# Patient Record
Sex: FEMALE | Race: WHITE | Employment: OTHER | URBAN - METROPOLITAN AREA
[De-identification: names, ages, dates, MRNs, and addresses within clinical notes are randomized per-mention and may not be internally consistent; named-entity substitution may affect disease eponyms.]

---

## 2017-01-04 DIAGNOSIS — G40.909 SEIZURE DISORDER (HCC): ICD-10-CM

## 2017-01-04 RX ORDER — PHENYTOIN SODIUM 100 MG/1
CAPSULE, EXTENDED RELEASE ORAL
Qty: 270 CAP | Refills: 1 | Status: SHIPPED | OUTPATIENT
Start: 2017-01-04 | End: 2017-07-27 | Stop reason: SDUPTHER

## 2017-04-21 ENCOUNTER — HOSPITAL ENCOUNTER (OUTPATIENT)
Dept: MAMMOGRAPHY | Age: 72
Discharge: HOME | End: 2017-04-21
Attending: INTERNAL MEDICINE
Payer: MEDICARE

## 2017-04-21 DIAGNOSIS — Z12.31 VISIT FOR SCREENING MAMMOGRAM: ICD-10-CM

## 2017-04-21 PROCEDURE — 77067 SCR MAMMO BI INCL CAD: CPT

## 2017-05-17 ENCOUNTER — OFFICE VISIT (OUTPATIENT)
Dept: INTERNAL MEDICINE CLINIC | Age: 72
End: 2017-05-17

## 2017-05-17 ENCOUNTER — HOSPITAL ENCOUNTER (OUTPATIENT)
Dept: LAB | Age: 72
Discharge: HOME | End: 2017-05-17
Payer: MEDICARE

## 2017-05-17 VITALS
DIASTOLIC BLOOD PRESSURE: 82 MMHG | BODY MASS INDEX: 29.66 KG/M2 | RESPIRATION RATE: 18 BRPM | WEIGHT: 178 LBS | HEIGHT: 65 IN | SYSTOLIC BLOOD PRESSURE: 136 MMHG | HEART RATE: 78 BPM | OXYGEN SATURATION: 97 % | TEMPERATURE: 98.8 F

## 2017-05-17 DIAGNOSIS — G40.909 SEIZURE DISORDER (HCC): ICD-10-CM

## 2017-05-17 DIAGNOSIS — E55.9 VITAMIN D DEFICIENCY: ICD-10-CM

## 2017-05-17 DIAGNOSIS — Z00.00 MEDICARE ANNUAL WELLNESS VISIT, SUBSEQUENT: Primary | ICD-10-CM

## 2017-05-17 DIAGNOSIS — Z13.39 SCREENING FOR ALCOHOLISM: ICD-10-CM

## 2017-05-17 DIAGNOSIS — Z00.00 ROUTINE GENERAL MEDICAL EXAMINATION AT A HEALTH CARE FACILITY: ICD-10-CM

## 2017-05-17 PROCEDURE — 80053 COMPREHEN METABOLIC PANEL: CPT

## 2017-05-17 PROCEDURE — 36415 COLL VENOUS BLD VENIPUNCTURE: CPT

## 2017-05-17 PROCEDURE — 81001 URINALYSIS AUTO W/SCOPE: CPT

## 2017-05-17 PROCEDURE — 84443 ASSAY THYROID STIM HORMONE: CPT

## 2017-05-17 PROCEDURE — 80185 ASSAY OF PHENYTOIN TOTAL: CPT

## 2017-05-17 PROCEDURE — 82306 VITAMIN D 25 HYDROXY: CPT

## 2017-05-17 PROCEDURE — 85025 COMPLETE CBC W/AUTO DIFF WBC: CPT

## 2017-05-17 NOTE — MR AVS SNAPSHOT
Visit Information Date & Time Provider Department Dept. Phone Encounter #  
 5/17/2017  3:00 PM Ry Olivia MD [address] [id number] Internal Medicine [phone] [id number] Upcoming Health Maintenance Date Due  
 MEDICARE YEARLY EXAM 4/3/2015 INFLUENZA AGE 9 TO ADULT 8/1/2017 GLAUCOMA SCREENING Q2Y 10/15/2018 BREAST CANCER SCRN MAMMOGRAM 4/21/2019 COLONOSCOPY 1/13/2021 DTaP/Tdap/Td series (2 - Td) 1/15/2024 Allergies as of 5/17/2017  Review Complete On: 5/17/2017 By: Ry Olivia MD  
  
 Severity Noted Reaction Type Reactions Epinephrine High 10/21/2009    Unknown (comments) Sulfa (Sulfonamide Antibiotics) High 10/21/2009   Side Effect Other (comments) Kidneys shut down Ephedrine Medium 01/12/2011   Side Effect Palpitations Nsaids (Non-steroidal Anti-inflammatory Drug) Medium 01/12/2011   Side Effect Diarrhea Indigestion, nausea Ampicillin  10/21/2009    Unknown (comments) Codeine  10/21/2009    Unknown (comments) Demerol [Meperidine]  10/21/2009    Unknown (comments) Dilaudid [Hydromorphone (Bulk)]  10/21/2009    Unknown (comments) Erythromycin  10/21/2009    Unknown (comments) Levaquin [Levofloxacin]  10/21/2009   Intolerance Other (comments) GI upset Lidocaine  11/18/2009    Shortness of Breath Ciprofloxacin Low 01/12/2011   Side Effect Nausea and Vomiting Current Immunizations  Reviewed on 5/17/2017 Name Date Hep A Vaccine 2/15/2005 Influenza High Dose Vaccine PF 11/7/2016, 10/6/2014, 10/2/2013 Influenza Vaccine Split 10/16/2011 Influenza Vaccine Whole 11/11/2012, 10/15/2010 Pneumococcal Conjugate (PCV-13) 1/29/2016 Pneumococcal Vaccine (Unspecified Type) 11/18/2009 Td, Adsorbed PF 2/15/2005 Tdap 1/15/2014 Typhoid Vi Capsular Polysaccharide Vaccine 2/5/2005 Zoster Vaccine, Live 1/23/2013  Reviewed by Ry Olivia MD on 5/17/2017 at  3:23 PM  
You Were Diagnosed With   
  
 Codes Comments Medicare annual wellness visit, subsequent    -  Primary ICD-10-CM: Z00.00 ICD-9-CM: V70.0 Seizure disorder ([address]     ICD-10-CM: G40.909 ICD-9-CM: 345.90 Vitamin D deficiency     ICD-10-CM: E55.9 ICD-9-CM: 268.9 Routine general medical examination at a health care facility     ICD-10-CM: Z00.00 ICD-9-CM: V70.0 Screening for alcoholism     ICD-10-CM: Z13.89 ICD-9-CM: V79.1 Vitals BP Pulse Temp Resp Height(growth percentile) Weight(growth percentile) 136/82 78 98.8 °F (37.1 °C) (Oral) 18 5' 4.5\" (1.638 m) 178 lb (80.7 kg) SpO2 BMI OB Status Smoking Status 97% 30.08 kg/m2 Postmenopausal Former Smoker Vitals History BMI and BSA Data Body Mass Index Body Surface Area 30.08 kg/m 2 1.92 m 2 Preferred Pharmacy Pharmacy Name Phone [address] [facility] [phone] Your Updated Medication List  
  
   
This list is accurate as of: 5/17/17  3:51 PM.  Always use your most recent med list.  
  
  
  
  
 Biotin 2,500 mcg Cap Take  by mouth. CENTRUM SILVER Tab tablet Generic drug:  multivitamins-minerals-lutein Take  by mouth. cetirizine 10 mg tablet Commonly known as:  ZYRTEC Take 1 Tab by mouth nightly. DILANTIN EXTENDED 100 mg ER capsule Generic drug:  phenytoin ER  
TAKE 3 CAPSULES (300 MG) DAILY docusate sodium 100 mg capsule Commonly known as:  Darnelle Elias Take 100 mg by mouth two (2) times a day. FISH OIL 1,000 mg Cap Generic drug:  omega-3 fatty acids-vitamin e Take 1 Cap by mouth. folic acid 1 mg tablet Commonly known as:  Google Take 0.4 mg by mouth daily. pantoprazole 40 mg tablet Commonly known as:  PROTONIX Take 1 Tab by mouth daily. Indications: GASTROESOPHAGEAL REFLUX  
  
 ROGAINE EX  
by Apply Externally route. VITAMIN D3 1,000 unit tablet Generic drug:  cholecalciferol Take  by mouth daily. We Performed the Following CBC WITH AUTOMATED DIFF [85903 CPT(R)] METABOLIC PANEL, COMPREHENSIVE [51513 CPT(R)] PHENYTOIN [id number] CPT(R)] TSH RFX ON ABNORMAL TO FREE T4 [[id number] Custom] UA/M W/RFLX CULTURE, ROUTINE [[id number] Custom] VITAMIN D, 25 HYDROXY [id number] CPT(R)] Introducing [facility]! Dear Glenn Gao: Thank you for requesting a [employer] account. Our records indicate that you already have an active [employer] account. You can access your account anytime at [email or web address] Did you know that you can access your hospital and ER discharge instructions at any time in [employer]? You can also review all of your test results from your hospital stay or ER visit. Additional Information If you have questions, please visit the Frequently Asked Questions section of the [employer] website at [email or web address] Remember, [employer] is NOT to be used for urgent needs. For medical emergencies, dial 911. Now available from your iPhone and Android! Please provide this summary of care documentation to your next provider. Your primary care clinician is listed as Vivi Molina64 If you have any questions after today's visit, please call [phone].

## 2017-05-17 NOTE — ACP (ADVANCE CARE PLANNING)
Advance Care Planning (ACP) Provider Conversation Snapshot    Date of ACP Conversation: 05/17/17  Persons included in Conversation:  patient  Length of ACP Conversation in minutes:  <16 minutes (Non-Billable)    Authorized Decision Maker (if patient is incapable of making informed decisions): This person is:   Healthcare Agent/Medical Power of  under Advance Directive          For Patients with Decision Making Capacity:   Values/Goals: Exploration of values, goals, and preferences if recovery is not expected, even with continued medical treatment in the event of:  Imminent death    Conversation Outcomes / Follow-Up Plan:   has a document and will provide to us.

## 2017-05-18 LAB
25(OH)D3+25(OH)D2 SERPL-MCNC: 33.4 NG/ML (ref 30–100)
ALBUMIN SERPL-MCNC: 4.5 G/DL (ref 3.5–4.8)
ALBUMIN/GLOB SERPL: 1.7 {RATIO} (ref 1.2–2.2)
ALP SERPL-CCNC: 151 IU/L (ref 39–117)
ALT SERPL-CCNC: 14 IU/L (ref 0–32)
APPEARANCE UR: CLEAR
AST SERPL-CCNC: 20 IU/L (ref 0–40)
BACTERIA #/AREA URNS HPF: NORMAL /[HPF]
BASOPHILS # BLD AUTO: 0 X10E3/UL (ref 0–0.2)
BASOPHILS NFR BLD AUTO: 0 %
BILIRUB SERPL-MCNC: 0.2 MG/DL (ref 0–1.2)
BILIRUB UR QL STRIP: NEGATIVE
BUN SERPL-MCNC: 18 MG/DL (ref 8–27)
BUN/CREAT SERPL: 40 (ref 12–28)
CALCIUM SERPL-MCNC: 10.2 MG/DL (ref 8.7–10.3)
CASTS URNS QL MICRO: NORMAL /LPF
CHLORIDE SERPL-SCNC: 99 MMOL/L (ref 96–106)
CO2 SERPL-SCNC: 22 MMOL/L (ref 18–29)
COLOR UR: YELLOW
CREAT SERPL-MCNC: 0.45 MG/DL (ref 0.57–1)
EOSINOPHIL # BLD AUTO: 0 X10E3/UL (ref 0–0.4)
EOSINOPHIL NFR BLD AUTO: 0 %
EPI CELLS #/AREA URNS HPF: NORMAL /HPF
ERYTHROCYTE [DISTWIDTH] IN BLOOD BY AUTOMATED COUNT: 13.9 % (ref 12.3–15.4)
GLOBULIN SER CALC-MCNC: 2.7 G/DL (ref 1.5–4.5)
GLUCOSE SERPL-MCNC: 125 MG/DL (ref 65–99)
GLUCOSE UR QL: NEGATIVE
HCT VFR BLD AUTO: 37.6 % (ref 34–46.6)
HGB BLD-MCNC: 12.9 G/DL (ref 11.1–15.9)
HGB UR QL STRIP: NEGATIVE
IMM GRANULOCYTES # BLD: 0 X10E3/UL (ref 0–0.1)
IMM GRANULOCYTES NFR BLD: 0 %
KETONES UR QL STRIP: NEGATIVE
LEUKOCYTE ESTERASE UR QL STRIP: NEGATIVE
LYMPHOCYTES # BLD AUTO: 0.9 X10E3/UL (ref 0.7–3.1)
LYMPHOCYTES NFR BLD AUTO: 6 %
MCH RBC QN AUTO: 30.1 PG (ref 26.6–33)
MCHC RBC AUTO-ENTMCNC: 34.3 G/DL (ref 31.5–35.7)
MCV RBC AUTO: 88 FL (ref 79–97)
MICRO URNS: NORMAL
MICRO URNS: NORMAL
MONOCYTES # BLD AUTO: 0.8 X10E3/UL (ref 0.1–0.9)
MONOCYTES NFR BLD AUTO: 5 %
NEUTROPHILS # BLD AUTO: 14.2 X10E3/UL (ref 1.4–7)
NEUTROPHILS NFR BLD AUTO: 89 %
NITRITE UR QL STRIP: NEGATIVE
PH UR STRIP: 6.5 [PH] (ref 5–7.5)
PHENYTOIN SERPL-MCNC: 14.8 UG/ML (ref 10–20)
PLATELET # BLD AUTO: 213 X10E3/UL (ref 150–379)
POTASSIUM SERPL-SCNC: 4.3 MMOL/L (ref 3.5–5.2)
PROT SERPL-MCNC: 7.2 G/DL (ref 6–8.5)
PROT UR QL STRIP: NEGATIVE
RBC # BLD AUTO: 4.29 X10E6/UL (ref 3.77–5.28)
RBC #/AREA URNS HPF: NORMAL /HPF
SODIUM SERPL-SCNC: 140 MMOL/L (ref 134–144)
SP GR UR: 1.01 (ref 1–1.03)
TSH SERPL DL<=0.005 MIU/L-ACNC: 0.46 UIU/ML (ref 0.45–4.5)
URINALYSIS REFLEX, 377202: NORMAL
UROBILINOGEN UR STRIP-MCNC: 0.2 MG/DL (ref 0.2–1)
WBC # BLD AUTO: 16 X10E3/UL (ref 3.4–10.8)
WBC #/AREA URNS HPF: NORMAL /HPF

## 2017-05-18 NOTE — PROGRESS NOTES
This is a Subsequent Medicare Annual Wellness Visit providing Personalized Prevention Plan Services (PPPS) (Performed 12 months after initial AWV and PPPS )  Also for followup of seizure issues. No seizures for years. Some ongoing issues with knee pain and received an injection yesterday. Her knee is better as well as her back pain and radiation to the thigh is better as well. No change in bowels or bladder. I have reviewed the patient's medical history in detail and updated the computerized patient record. History     Past Medical History:   Diagnosis Date    Asthma     Cerebral meningioma ([facility] [address] resection 1992    GERD (gastroesophageal reflux disease)     Other ill-defined conditions 1992    brain tumor-benign    Thromboembolus ([facility] [address]     PE x2      Past Surgical History:   Procedure Laterality Date    EXCIS INFRATENT MENINGIOMA      FOREARM/WRIST SURGERY UNLISTED  2010    Right    HX ADENOIDECTOMY      HX CHOLECYSTECTOMY      HX GYN      ectopic pregnancy    HX HEENT  1992    removed brain tumor and plate inserted    HX HERNIA REPAIR      bilateral    HX ORTHOPAEDIC      bilateral knee surgery    HX TONSILLECTOMY      MO COLONOSCOPY FLX DX W/COLLJ SPEC WHEN PFRMD  1/13/2011         TREAT ECTOPIC PREG,ABD PREG       Current Outpatient Prescriptions   Medication Sig Dispense Refill    DILANTIN EXTENDED 100 mg ER capsule TAKE 3 CAPSULES (300 MG) DAILY 270 Cap 1    MINOXIDIL (ROGAINE EX) by Apply Externally route.  cholecalciferol (VITAMIN D3) 1,000 unit tablet Take  by mouth daily.  omega-3 fatty acids-vitamin e (FISH OIL) 1,000 mg cap Take 1 Cap by mouth.  Biotin 2,500 mcg cap Take  by mouth.  folic acid (FOLVITE) 1 mg tablet Take 0.4 mg by mouth daily.  pantoprazole (PROTONIX) 40 mg tablet Take 1 Tab by mouth daily. Indications: GASTROESOPHAGEAL REFLUX 90 Tab 2    cetirizine (ZYRTEC) 10 mg tablet Take 1 Tab by mouth nightly.       docusate sodium (COLACE) 100 mg capsule Take 100 mg by mouth two (2) times a day.  multivitamins-minerals-lutein (CENTRUM SILVER) Tab Take  by mouth. Allergies   Allergen Reactions    Epinephrine Unknown (comments)    Sulfa (Sulfonamide Antibiotics) Other (comments)     Kidneys shut down    Ephedrine Palpitations    Nsaids (Non-Steroidal Anti-Inflammatory Drug) Diarrhea     Indigestion, nausea    Ampicillin Unknown (comments)    Codeine Unknown (comments)    Demerol [Meperidine] Unknown (comments)    Dilaudid [Hydromorphone (Bulk)] Unknown (comments)    Erythromycin Unknown (comments)    Levaquin [Levofloxacin] Other (comments)     GI upset    Lidocaine Shortness of Breath    Ciprofloxacin Nausea and Vomiting     Family History   Problem Relation Age of Onset    Heart Attack Father     Other Father      Gi bleed    Heart Disease Father      MI  74yo   Georganna Duverney Other Mother      Pneumonia    Heart Disease Mother      CHF/pacemaker    Breast Cancer Maternal Aunt 79     Social History   Substance Use Topics    Smoking status: Former Smoker     Packs/day: 1.00     Years: 10.00     Types: Cigarettes     Quit date: 1/12/1973    Smokeless tobacco: Never Used      Comment: 1971    Alcohol use No     Patient Active Problem List   Diagnosis Code    Seizure ([facility] [address] R56.9    Screen for colon cancer Z12.11    Seizure disorder ([facility] [address] G40.909    Recurrent UTI N39.0    Chest pain R07.9    Abnormal EKG R94.31       Depression Risk Factor Screening:     PHQ over the last two weeks 5/17/2017   Little interest or pleasure in doing things Not at all   Feeling down, depressed or hopeless Not at all   Total Score PHQ 2 0     Alcohol Risk Factor Screening: On any occasion during the past 3 months, have you had more than 3 drinks containing alcohol? No    Do you average more than 7 drinks per week? No      Functional Ability and Level of Safety:     Hearing Loss   normal-to-mild    Activities of Daily Living   Self-care.    Requires assistance with: no ADLs    Fall Risk     Fall Risk Assessment, last 12 mths 5/17/2017   Able to walk? Yes   Fall in past 12 months? No   Fall with injury? -   Number of falls in past 12 months -   Fall Risk Score -     Abuse Screen   Patient is not abused    Review of Systems   A comprehensive review of systems was negative except for that written in the HPI. Physical Examination     Evaluation of Cognitive Function:  Mood/affect:  happy  Appearance: age appropriate  Family member/caregiver input: None    Visit Vitals    /82    Pulse 78    Temp 98.8 °F (37.1 °C) (Oral)    Resp 18    Ht 5' 4.5\" (1.638 m)    Wt 178 lb (80.7 kg)    SpO2 97%    BMI 30.08 kg/m2     General appearance: alert, cooperative, no distress, appears stated age  Head: Normocephalic, without obvious abnormality, atraumatic  Eyes: negative  Ears: normal TM's and external ear canals AU  Nose: Nares normal. Septum midline. Mucosa normal. No drainage or sinus tenderness. Throat: Lips, mucosa, and tongue normal. Teeth and gums normal  Neck: supple, symmetrical, trachea midline, no adenopathy, thyroid: not enlarged, symmetric, no tenderness/mass/nodules, no carotid bruit and no JVD  Back: symmetric, no curvature. ROM normal. No CVA tenderness. Lungs: clear to auscultation bilaterally  Heart: regular rate and rhythm, S1, S2 normal, no murmur, click, rub or gallop  Abdomen: soft, non-tender. Bowel sounds normal. No masses,  no organomegaly  Extremities: extremities normal, atraumatic, no cyanosis or edema  Pulses: 2+ and symmetric  Lymph nodes: Cervical, supraclavicular, and axillary nodes normal.  Neurologic: Grossly normal  DJD changes in the knees and negative SLR and no back tenderness.    Patient Care Team:  Deny Clemente MD as PCP - General  Sohail Jeffries MD (Ophthalmology)    Advice/Referrals/Counseling   Education and counseling provided:  Are appropriate based on today's review and evaluation  End-of-Life planning (with patient's consent)  Diabetes screening test      Assessment/Plan   Katya Angeles was seen today for annual wellness visit. Diagnoses and all orders for this visit:    Medicare annual wellness visit, subsequent    Seizure disorder ([facility] [address] - stable on current meds  -     CBC WITH AUTOMATED DIFF  -     METABOLIC PANEL, COMPREHENSIVE  -     TSH RFX ON ABNORMAL TO FREE T4  -     UA/M W/RFLX CULTURE, ROUTINE  -     PHENYTOIN    Vitamin D deficiency - check to be sure still above 30.   -     VITAMIN D, 25 HYDROXY    Routine general medical examination at a health care facility    Screening for alcoholism    Other orders  -     Cancel: LIPID PANEL - prior levels were great with very high HDL levels. No need for followup levels. Follow-up Disposition: 12 months and as needed. Advised her to call back or return to office if symptoms worsen/change/persist.  Discussed expected course/resolution/complications of diagnosis in detail with patient. Medication risks/benefits/costs/interactions/alternatives discussed with patient. She was given an after visit summary which includes diagnoses, current medications, & vitals. She expressed understanding with the diagnosis and plan. Elicia Hernandez

## 2017-07-27 DIAGNOSIS — G40.909 SEIZURE DISORDER (HCC): ICD-10-CM

## 2017-07-28 RX ORDER — PHENYTOIN SODIUM 100 MG/1
CAPSULE, EXTENDED RELEASE ORAL
Qty: 270 CAP | Refills: 0 | Status: SHIPPED | OUTPATIENT
Start: 2017-07-28 | End: 2017-10-16 | Stop reason: SDUPTHER

## 2017-10-16 DIAGNOSIS — G40.909 SEIZURE DISORDER (HCC): ICD-10-CM

## 2017-10-16 RX ORDER — PHENYTOIN SODIUM 100 MG/1
CAPSULE, EXTENDED RELEASE ORAL
Qty: 270 CAP | Refills: 0 | Status: SHIPPED | OUTPATIENT
Start: 2017-10-16 | End: 2018-01-10 | Stop reason: SDUPTHER

## 2018-01-10 DIAGNOSIS — G40.909 SEIZURE DISORDER (HCC): ICD-10-CM

## 2018-01-10 RX ORDER — PHENYTOIN SODIUM 100 MG/1
CAPSULE, EXTENDED RELEASE ORAL
Qty: 270 CAP | Refills: 0 | Status: SHIPPED | OUTPATIENT
Start: 2018-01-10 | End: 2018-03-24 | Stop reason: SDUPTHER

## 2018-03-24 DIAGNOSIS — G40.909 SEIZURE DISORDER (HCC): ICD-10-CM

## 2018-03-25 RX ORDER — PHENYTOIN SODIUM 100 MG/1
CAPSULE, EXTENDED RELEASE ORAL
Qty: 270 CAP | Refills: 0 | Status: SHIPPED | OUTPATIENT
Start: 2018-03-25 | End: 2018-07-10 | Stop reason: SDUPTHER

## 2018-05-17 ENCOUNTER — HOSPITAL ENCOUNTER (OUTPATIENT)
Dept: MAMMOGRAPHY | Age: 73
Discharge: HOME | End: 2018-05-17
Attending: INTERNAL MEDICINE
Payer: MEDICARE

## 2018-05-17 DIAGNOSIS — Z12.31 VISIT FOR SCREENING MAMMOGRAM: ICD-10-CM

## 2018-05-17 PROCEDURE — 77067 SCR MAMMO BI INCL CAD: CPT

## 2018-05-18 ENCOUNTER — OFFICE VISIT (OUTPATIENT)
Dept: INTERNAL MEDICINE CLINIC | Age: 73
End: 2018-05-18

## 2018-05-18 VITALS
RESPIRATION RATE: 16 BRPM | HEART RATE: 80 BPM | HEIGHT: 64 IN | OXYGEN SATURATION: 98 % | SYSTOLIC BLOOD PRESSURE: 130 MMHG | TEMPERATURE: 97.6 F | BODY MASS INDEX: 31.76 KG/M2 | DIASTOLIC BLOOD PRESSURE: 76 MMHG | WEIGHT: 186 LBS

## 2018-05-18 DIAGNOSIS — M81.0 AGE-RELATED OSTEOPOROSIS WITHOUT CURRENT PATHOLOGICAL FRACTURE: ICD-10-CM

## 2018-05-18 DIAGNOSIS — G40.909 SEIZURE DISORDER (HCC): ICD-10-CM

## 2018-05-18 DIAGNOSIS — Z00.00 MEDICARE ANNUAL WELLNESS VISIT, SUBSEQUENT: Primary | ICD-10-CM

## 2018-05-18 DIAGNOSIS — Z23 ENCOUNTER FOR IMMUNIZATION: ICD-10-CM

## 2018-05-18 DIAGNOSIS — R00.2 PALPITATIONS: ICD-10-CM

## 2018-05-18 DIAGNOSIS — R73.01 FASTING HYPERGLYCEMIA: ICD-10-CM

## 2018-05-18 DIAGNOSIS — E78.2 MIXED HYPERLIPIDEMIA: ICD-10-CM

## 2018-05-18 RX ORDER — CALCIUM CARBONATE 200(500)MG
1 TABLET,CHEWABLE ORAL DAILY
COMMUNITY
End: 2020-05-20 | Stop reason: ALTCHOICE

## 2018-05-18 NOTE — PROGRESS NOTES
This is the Subsequent Medicare Annual Wellness Exam, performed 12 months or more after the Initial AWV or the last Subsequent AWV  As well as for follow-up of her health issues. She has been generally doing fairly well. She is trying to lose weight recently. She has had no seizures. She has had some mild headaches on the left side of her head but nothing that are bad enough to take medication for. She has had some palpitations episodes as well that seem to last as long as a few hours. No relationship to exertion. No chest pains or shortness of breath. No cough or wheeze. No change in bowel or bladder habits. She has had some ongoing issues with knee pain that limit her ability to do her activity. I have reviewed the patient's medical history in detail and updated the computerized patient record. History     Past Medical History:   Diagnosis Date    Asthma     Cerebral meningioma ([facility] [address] resection 1992    GERD (gastroesophageal reflux disease)     Other ill-defined conditions(799.89) 1992    brain tumor-benign    Thromboembolus ([facility] [address]     PE x2      Past Surgical History:   Procedure Laterality Date    EXCIS INFRATENT MENINGIOMA      FOREARM/WRIST SURGERY UNLISTED  2010    Right    HX ADENOIDECTOMY      HX CHOLECYSTECTOMY      HX GYN      ectopic pregnancy    HX HEENT  1992    removed brain tumor and plate inserted    HX HERNIA REPAIR      bilateral    HX ORTHOPAEDIC      bilateral knee surgery    HX TONSILLECTOMY      GA COLONOSCOPY FLX DX W/COLLJ SPEC WHEN PFRMD  1/13/2011         TREAT ECTOPIC PREG,ABD PREG       Current Outpatient Prescriptions   Medication Sig Dispense Refill    calcium carbonate (TUMS) 200 mg calcium (500 mg) chew Take 1 Tab by mouth daily.  fluticasone propionate (FLONASE ALLERGY RELIEF NA) by Nasal route.  DILANTIN EXTENDED 100 mg ER capsule TAKE 3 CAPSULES DAILY 270 Cap 0    docusate sodium (COLACE) 100 mg capsule Take 100 mg by mouth as needed.  MINOXIDIL (ROGAINE EX) by Apply Externally route.  cholecalciferol (VITAMIN D3) 1,000 unit tablet Take  by mouth daily.  omega-3 fatty acids-vitamin e (FISH OIL) 1,000 mg cap Take 1 Cap by mouth.  multivitamins-minerals-lutein (CENTRUM SILVER) Tab Take  by mouth.  Biotin 2,500 mcg cap Take  by mouth.  folic acid (FOLVITE) 1 mg tablet Take 0.4 mg by mouth daily. Allergies   Allergen Reactions    Epinephrine Unknown (comments)    Sulfa (Sulfonamide Antibiotics) Other (comments)     Kidneys shut down    Ephedrine Palpitations    Nsaids (Non-Steroidal Anti-Inflammatory Drug) Diarrhea     Indigestion, nausea    Ampicillin Unknown (comments)    Codeine Unknown (comments)    Demerol [Meperidine] Unknown (comments)    Dilaudid [Hydromorphone (Bulk)] Unknown (comments)    Erythromycin Unknown (comments)    Levaquin [Levofloxacin] Other (comments)     GI upset    Lidocaine Shortness of Breath    Ciprofloxacin Nausea and Vomiting     Family History   Problem Relation Age of Onset    Heart Attack Father     Other Father      Gi bleed    Heart Disease Father      MI  74yo   Karen Marians Other Mother      Pneumonia    Heart Disease Mother      CHF/pacemaker    Breast Cancer Maternal Aunt 79     Social History   Substance Use Topics    Smoking status: Former Smoker     Packs/day: 1.00     Years: 10.00     Types: Cigarettes     Quit date: 1/12/1973    Smokeless tobacco: Never Used      Comment: 1971    Alcohol use No     Patient Active Problem List   Diagnosis Code    Seizure ([facility] [address] R56.9    Screen for colon cancer Z12.11    Seizure disorder ([facility] [address] G40.909    Recurrent UTI N39.0    Chest pain R07.9    Abnormal EKG R94.31   ROS - Per HPI. Has just seen the derm MD this week.    Physical Examination: General appearance - alert, well appearing, and in no distress  Eyes - pupils equal and reactive, extraocular eye movements intact  Ears - bilateral TM's and external ear canals normal  Nose - normal and patent, no erythema, discharge or polyps  Mouth - mucous membranes moist, pharynx normal without lesions  Neck - supple, no significant adenopathy  Lymphatics - no palpable lymphadenopathy, no hepatosplenomegaly  Chest - clear to auscultation, no wheezes, rales or rhonchi, symmetric air entry  Heart - normal rate, regular rhythm, normal S1, S2, no murmurs, rubs, clicks or gallops  Abdomen - soft, nontender, nondistended, no masses or organomegaly  Neurological - alert, oriented, normal speech, no focal findings or movement disorder noted  Musculoskeletal - no joint tenderness, deformity or swelling, abnormal exam of both knees with DJD changes. Extremities - peripheral pulses normal, no pedal edema, no clubbing or cyanosis      Depression Risk Factor Screening:     PHQ over the last two weeks 5/17/2017   Little interest or pleasure in doing things Not at all   Feeling down, depressed or hopeless Not at all   Total Score PHQ 2 0     Alcohol Risk Factor Screening: You do not drink alcohol or very rarely. Functional Ability and Level of Safety:   Hearing Loss  Hearing is good. Activities of Daily Living  The home contains: no safety equipment. Patient does total self care    Fall Risk  Fall Risk Assessment, last 12 mths 5/17/2017   Able to walk? Yes   Fall in past 12 months?  No   Fall with injury? -   Number of falls in past 12 months -   Fall Risk Score -       Abuse Screen  Patient is not abused    Cognitive Screening   Evaluation of Cognitive Function:  Has your family/caregiver stated any concerns about your memory: no      Patient Care Team   Patient Care Team:  Leia Melendrez MD as PCP - Renee Brito MD (Ophthalmology)    Assessment/Plan   Education and counseling provided:  Are appropriate based on today's review and evaluation  End-of-Life planning (with patient's consent)  Screening Mammography  Bone mass measurement (DEXA)  Diabetes screening test    Diagnoses and all orders for this visit:    1. Medicare annual wellness visit, subsequent    2. Encounter for immunization    3. Seizure disorder ([facility] [address] -stable. Will check level of Dilantin and continue current meds. -     PHENYTOIN    4. Mixed hyperlipidemia -diet controlled. Will check levels to be sure stable. -     CBC WITH AUTOMATED DIFF  -     METABOLIC PANEL, COMPREHENSIVE  -     LIPID PANEL  -     TSH RFX ON ABNORMAL TO FREE T4  -     UA/M W/RFLX CULTURE, ROUTINE    5. Palpitations -EKG today is unchanged. If she has frequent long episodes she will let me know and we will get an event monitor.  -     AMB POC EKG ROUTINE W/ 12 LEADS, INTER & REP    6. Age-related osteoporosis without current pathological fracture -she has been treated in the past with Fosamax for short period of time but stopped by her gynecologist.  Her last bone density showed osteoporosis in her wrist only. We will repeat this in the next year and make a decision regarding further treatment. -     DEXA BONE DENSITY STUDY AXIAL; Future    7. Fasting hyperglycemia -will repeat blood sugar and A1c. She will continue to work on diet and exercise for weight loss. -     HEMOGLOBIN A1C WITH EAG    Other orders  -     varicella-zoster recombinant, PF, (SHINGRIX, PF,) 50 mcg/0.5 mL susr injection; 0.5 mL by IntraMUSCular route once for 1 dose. Follow-up Disposition: 12 months and as needed   Advised her to call back or return to office if symptoms worsen/change/persist.  Discussed expected course/resolution/complications of diagnosis in detail with patient. Medication risks/benefits/costs/interactions/alternatives discussed with patient. She was given an after visit summary which includes diagnoses, current medications, & vitals. She expressed understanding with the diagnosis and plan. There are no preventive care reminders to display for this patient.

## 2018-05-18 NOTE — PATIENT INSTRUCTIONS
Medicare Wellness Visit, Female    The best way to live healthy is to have a lifestyle where you eat a well-balanced diet, exercise regularly, limit alcohol use, and quit all forms of tobacco/nicotine, if applicable. Regular preventive services are another way to keep healthy. Preventive services (vaccines, screening tests, monitoring & exams) can help personalize your care plan, which helps you manage your own care. Screening tests can find health problems at the earliest stages, when they are easiest to treat. [address] [facility] follows the current, evidence-based guidelines published by the [facility] Patricio Reji ([facility]) when recommending preventive services for our patients. Because we follow these guidelines, sometimes recommendations change over time as research supports it. (For example, mammograms used to be recommended annually. Even though Medicare will still pay for an annual mammogram, the newer guidelines recommend a mammogram every two years for women of average risk.)    Of course, you and your provider may decide to screen more often for some diseases, based on your risk and co-morbidities (chronic disease you are already diagnosed with). Preventive services for you include:    - Medicare offers their members a free annual wellness visit, which is time for you and your primary care provider to discuss and plan for your preventive service needs. Take advantage of this benefit every year!    -All people over age 72 should receive the recommended pneumonia vaccines. Current USPSTF guidelines recommend a series of two vaccines for the best pneumonia protection.     -All adults should have a yearly flu vaccine and a tetanus vaccine every 10 years. All adults age 61 years should receive a shingles vaccine once in their lifetime.      -A bone mass density test is recommended when a woman turns 65 to screen for osteoporosis.  This test is only recommended once as a screening. Some providers will use this same test as a disease monitoring tool if you already have osteoporosis. -All adults age 38-68 years who are overweight should have a diabetes screening test once every three years.     -Other screening tests & preventive services for persons with diabetes include: an eye exam to screen for diabetic retinopathy, a kidney function test, a foot exam, and stricter control over your cholesterol.     -Cardiovascular screening for adults with routine risk involves an electrocardiogram (ECG) at intervals determined by the provider.     -Colorectal cancer screenings should be done for adults age 54-65 years with normal risk. There are a number of acceptable methods of screening for this type of cancer. Each test has its own benefits and drawbacks. Discuss with your provider what is most appropriate for you during your annual wellness visit. The different tests include: colonoscopy (considered the best screening method), a fecal occult blood test, a fecal DNA test, and sigmoidoscopy. -Breast cancer screenings are recommended every other year for women of normal risk age 54-69 years.     -Cervical cancer screenings for women over age 72 are only recommended with certain risk factors.     -All adults born between [facility] should be screened once for Hepatitis C. Here is a list of your current Health Maintenance items (your personalized list of preventive services) with a due date: There are no preventive care reminders to display for this patient.

## 2018-05-18 NOTE — MR AVS SNAPSHOT
[address] [facility] [address] [facility] 
[phone] Patient: Jatinder Llanos MRN:  [id number] Visit Information Date & Time Provider Department Dept. Phone Encounter #  
 5/18/2018  1:30 PM Fauzia Knight MD [address] Internal Medicine [phone] [id number] Your Appointments 5/20/2019  2:30 PM  
Medicare Physical with Fauzia Knight MD  
[address] Internal Medicine [address] [facility]) Appt Note: medicare wellness [address] [facility] [address] [place] [address]  
[id number] [place] [address] [facility] Upcoming Health Maintenance Date Due Influenza Age 5 to Adult 8/1/2018 GLAUCOMA SCREENING Q2Y 10/15/2018 MEDICARE YEARLY EXAM 5/19/2019 BREAST CANCER SCRN MAMMOGRAM 5/17/2020 COLONOSCOPY 1/13/2021 DTaP/Tdap/Td series (2 - Td) 1/15/2024 Allergies as of 5/18/2018  Review Complete On: 5/17/2017 By: Fauzia Knight MD  
  
 Severity Noted Reaction Type Reactions Epinephrine High 10/21/2009    Unknown (comments) Sulfa (Sulfonamide Antibiotics) High 10/21/2009   Side Effect Other (comments) Kidneys shut down Ephedrine Medium 01/12/2011   Side Effect Palpitations Nsaids (Non-steroidal Anti-inflammatory Drug) Medium 01/12/2011   Side Effect Diarrhea Indigestion, nausea Ampicillin  10/21/2009    Unknown (comments) Codeine  10/21/2009    Unknown (comments) Demerol [Meperidine]  10/21/2009    Unknown (comments) Dilaudid [Hydromorphone (Bulk)]  10/21/2009    Unknown (comments) Erythromycin  10/21/2009    Unknown (comments) Levaquin [Levofloxacin]  10/21/2009   Intolerance Other (comments) GI upset Lidocaine  11/18/2009    Shortness of Breath Ciprofloxacin Low 01/12/2011   Side Effect Nausea and Vomiting Current Immunizations  Reviewed on 5/18/2018 Name Date Hep A Vaccine 2/15/2005 Influenza High Dose Vaccine PF 11/9/2017, 11/7/2016, 10/6/2014, 10/2/2013 Influenza Vaccine Split 10/16/2011 Influenza Vaccine Whole 11/11/2012, 10/15/2010 Pneumococcal Conjugate (PCV-13) 1/29/2016 Pneumococcal Polysaccharide (PPSV-23) 11/18/2009 Td, Adsorbed PF 2/15/2005 Tdap 1/15/2014 Typhoid Vi Capsular Polysaccharide Vaccine 2/5/2005 Zoster Vaccine, Live 1/23/2013 Reviewed by Abril Chowdary MD on 5/18/2018 at  2:12 PM  
You Were Diagnosed With   
  
 Codes Comments Medicare annual wellness visit, subsequent    -  Primary ICD-10-CM: Z00.00 ICD-9-CM: V70.0 Encounter for immunization     ICD-10-CM: H77 ICD-9-CM: V03.89 Seizure disorder ([address]     ICD-10-CM: G40.909 ICD-9-CM: 345.90 Mixed hyperlipidemia     ICD-10-CM: E78.2 ICD-9-CM: 272.2 Palpitations     ICD-10-CM: R00.2 ICD-9-CM: 785.1 Age-related osteoporosis without current pathological fracture     ICD-10-CM: M81.0 ICD-9-CM: 733.01 Fasting hyperglycemia     ICD-10-CM: R73.01 
ICD-9-CM: 790.21 Vitals BP Pulse Temp Resp Height(growth percentile) Weight(growth percentile) 130/76 80 97.6 °F (36.4 °C) (Oral) 16 5' 4\" (1.626 m) 186 lb (84.4 kg) SpO2 BMI OB Status Smoking Status 98% 31.93 kg/m2 Postmenopausal Former Smoker Vitals History BMI and BSA Data Body Mass Index Body Surface Area  
 31.93 kg/m 2 1.95 m 2 Preferred Pharmacy Pharmacy Name Phone [id number] [address] [facility] [phone] Your Updated Medication List  
  
   
This list is accurate as of 5/18/18  2:44 PM.  Always use your most recent med list.  
  
  
  
  
 Biotin 2,500 mcg Cap Take  by mouth.  
  
 calcium carbonate 200 mg calcium (500 mg) Chew Commonly known as:  TUMS Take 1 Tab by mouth daily. CENTRUM SILVER Tab tablet Generic drug:  multivitamins-minerals-lutein Take  by mouth. DILANTIN EXTENDED 100 mg ER capsule Generic drug:  phenytoin ER  
TAKE 3 CAPSULES DAILY docusate sodium 100 mg capsule Commonly known as:  Idella Razor Take 100 mg by mouth as needed. FISH OIL 1,000 mg Cap Generic drug:  omega-3 fatty acids-vitamin e Take 1 Cap by mouth. FLONASE ALLERGY RELIEF NA  
by Nasal route. folic acid 1 mg tablet Commonly known as:  Google Take 0.4 mg by mouth daily. ROGAINE EX  
by Apply Externally route. varicella-zoster recombinant (PF) 50 mcg/0.5 mL Susr injection Commonly known as:  SHINGRIX (PF)  
0.5 mL by IntraMUSCular route once for 1 dose. VITAMIN D3 1,000 unit tablet Generic drug:  cholecalciferol Take  by mouth daily. Prescriptions Printed Refills  
 varicella-zoster recombinant, PF, (SHINGRIX, PF,) 50 mcg/0.5 mL susr injection 1 Si.5 mL by IntraMUSCular route once for 1 dose. Class: Print Route: IntraMUSCular We Performed the Following AMB POC EKG ROUTINE W/ 12 LEADS, INTER & REP [17084 CPT(R)] CBC WITH AUTOMATED DIFF [62231 CPT(R)] HEMOGLOBIN A1C WITH EAG [39316 CPT(R)] LIPID PANEL [09751 CPT(R)] METABOLIC PANEL, COMPREHENSIVE [97977 CPT(R)] PHENYTOIN P863162 CPT(R)] TSH RFX ON ABNORMAL TO FREE T4 [[id number] Custom] UA/M W/RFLX CULTURE, ROUTINE [[id number] Custom] To-Do List   
 2018 Imaging:  DEXA BONE DENSITY STUDY AXIAL Patient Instructions Medicare Wellness Visit, Female The best way to live healthy is to have a lifestyle where you eat a well-balanced diet, exercise regularly, limit alcohol use, and quit all forms of tobacco/nicotine, if applicable. Regular preventive services are another way to keep healthy. Preventive services (vaccines, screening tests, monitoring & exams) can help personalize your care plan, which helps you manage your own care. Screening tests can find health problems at the earliest stages, when they are easiest to treat. [address] [facility] follows the current, evidence-based guidelines published by the [facility] Patricio Mendoza ([facility]) when recommending preventive services for our patients. Because we follow these guidelines, sometimes recommendations change over time as research supports it. (For example, mammograms used to be recommended annually. Even though Medicare will still pay for an annual mammogram, the newer guidelines recommend a mammogram every two years for women of average risk.) Of course, you and your provider may decide to screen more often for some diseases, based on your risk and co-morbidities (chronic disease you are already diagnosed with). Preventive services for you include: - Medicare offers their members a free annual wellness visit, which is time for you and your primary care provider to discuss and plan for your preventive service needs. Take advantage of this benefit every year! 
 
-All people over age 72 should receive the recommended pneumonia vaccines. Current USPSTF guidelines recommend a series of two vaccines for the best pneumonia protection.  
 
-All adults should have a yearly flu vaccine and a tetanus vaccine every 10 years. All adults age 61 years should receive a shingles vaccine once in their lifetime.   
 
-A bone mass density test is recommended when a woman turns 65 to screen for osteoporosis. This test is only recommended once as a screening. Some providers will use this same test as a disease monitoring tool if you already have osteoporosis.  
 
-All adults age 38-68 years who are overweight should have a diabetes screening test once every three years.  
 
-Other screening tests & preventive services for persons with diabetes include: an eye exam to screen for diabetic retinopathy, a kidney function test, a foot exam, and stricter control over your cholesterol.  
 
-Cardiovascular screening for adults with routine risk involves an electrocardiogram (ECG) at intervals determined by the provider.  
 
-Colorectal cancer screenings should be done for adults age 54-65 years with normal risk. There are a number of acceptable methods of screening for this type of cancer. Each test has its own benefits and drawbacks. Discuss with your provider what is most appropriate for you during your annual wellness visit. The different tests include: colonoscopy (considered the best screening method), a fecal occult blood test, a fecal DNA test, and sigmoidoscopy. -Breast cancer screenings are recommended every other year for women of normal risk age 54-69 years.  
 
-Cervical cancer screenings for women over age 72 are only recommended with certain risk factors.  
 
-All adults born between [facility] should be screened once for Hepatitis C. Here is a list of your current Health Maintenance items (your personalized list of preventive services) with a due date: There are no preventive care reminders to display for this patient. Introducing [facility] & HEALTH SERVICES! Dear Katie Orellana: Thank you for requesting a [employer] account. Our records indicate that you already have an active [employer] account. You can access your account anytime at [email or web address] Did you know that you can access your hospital and ER discharge instructions at any time in [employer]? You can also review all of your test results from your hospital stay or ER visit. Additional Information If you have questions, please visit the Frequently Asked Questions section of the [employer] website at [email or web address] Remember, [employer] is NOT to be used for urgent needs. For medical emergencies, dial 911. Now available from your iPhone and Android! Please provide this summary of care documentation to your next provider. Your primary care clinician is listed as [place] [phone] If you have any questions after today's visit, please call [phone].

## 2018-05-30 ENCOUNTER — TELEPHONE (OUTPATIENT)
Dept: INTERNAL MEDICINE CLINIC | Age: 73
End: 2018-05-30

## 2018-05-30 LAB
CREATININE, EXTERNAL: 0.55
HBA1C MFR BLD HPLC: 5.3 %
LDL-C, EXTERNAL: 103

## 2018-05-30 NOTE — TELEPHONE ENCOUNTER
Marilee arteaga from [facility]. Pt is there for her lab work that was ordered on 5/18. She states that they can not do a ua reflex to cx and is it okay to just do a ucx. Yes, that is fine.

## 2018-07-10 DIAGNOSIS — G40.909 SEIZURE DISORDER (HCC): ICD-10-CM

## 2018-07-10 RX ORDER — PHENYTOIN SODIUM 100 MG/1
CAPSULE, EXTENDED RELEASE ORAL
Qty: 270 CAP | Refills: 0 | Status: SHIPPED | OUTPATIENT
Start: 2018-07-10 | End: 2018-09-28 | Stop reason: SDUPTHER

## 2018-09-28 DIAGNOSIS — G40.909 SEIZURE DISORDER (HCC): ICD-10-CM

## 2018-09-28 RX ORDER — PHENYTOIN SODIUM 100 MG/1
CAPSULE, EXTENDED RELEASE ORAL
Qty: 270 CAP | Refills: 0 | Status: SHIPPED | OUTPATIENT
Start: 2018-09-28 | End: 2019-01-14 | Stop reason: SDUPTHER

## 2019-01-14 DIAGNOSIS — G40.909 SEIZURE DISORDER (HCC): ICD-10-CM

## 2019-01-14 RX ORDER — PHENYTOIN SODIUM 100 MG/1
CAPSULE, EXTENDED RELEASE ORAL
Qty: 270 CAP | Refills: 0 | Status: SHIPPED | OUTPATIENT
Start: 2019-01-14 | End: 2019-03-22 | Stop reason: SDUPTHER

## 2019-03-22 DIAGNOSIS — G40.909 SEIZURE DISORDER (HCC): ICD-10-CM

## 2019-03-22 RX ORDER — PHENYTOIN SODIUM 100 MG/1
CAPSULE, EXTENDED RELEASE ORAL
Qty: 270 CAP | Refills: 0 | Status: SHIPPED | OUTPATIENT
Start: 2019-03-22 | End: 2019-07-12 | Stop reason: SDUPTHER

## 2019-05-20 ENCOUNTER — OFFICE VISIT (OUTPATIENT)
Dept: INTERNAL MEDICINE CLINIC | Age: 74
End: 2019-05-20

## 2019-05-20 VITALS
HEART RATE: 83 BPM | BODY MASS INDEX: 29.37 KG/M2 | DIASTOLIC BLOOD PRESSURE: 76 MMHG | TEMPERATURE: 98.4 F | OXYGEN SATURATION: 98 % | RESPIRATION RATE: 16 BRPM | WEIGHT: 172 LBS | HEIGHT: 64 IN | SYSTOLIC BLOOD PRESSURE: 132 MMHG

## 2019-05-20 DIAGNOSIS — E55.9 VITAMIN D DEFICIENCY: ICD-10-CM

## 2019-05-20 DIAGNOSIS — M81.0 AGE-RELATED OSTEOPOROSIS WITHOUT CURRENT PATHOLOGICAL FRACTURE: ICD-10-CM

## 2019-05-20 DIAGNOSIS — R73.01 FASTING HYPERGLYCEMIA: ICD-10-CM

## 2019-05-20 DIAGNOSIS — Z00.00 MEDICARE ANNUAL WELLNESS VISIT, SUBSEQUENT: ICD-10-CM

## 2019-05-20 DIAGNOSIS — G40.909 SEIZURE DISORDER (HCC): ICD-10-CM

## 2019-05-20 DIAGNOSIS — E78.2 MIXED HYPERLIPIDEMIA: Primary | ICD-10-CM

## 2019-05-20 DIAGNOSIS — R00.2 PALPITATIONS: ICD-10-CM

## 2019-05-20 RX ORDER — ACETAMINOPHEN 500 MG
TABLET ORAL
COMMUNITY
End: 2020-07-01

## 2019-05-20 RX ORDER — DICLOFENAC SODIUM 75 MG/1
75 TABLET, DELAYED RELEASE ORAL 2 TIMES DAILY
Qty: 60 TAB | Refills: 2 | Status: SHIPPED | OUTPATIENT
Start: 2019-05-20 | End: 2020-05-20 | Stop reason: ALTCHOICE

## 2019-05-20 RX ORDER — DICLOFENAC SODIUM 75 MG/1
TABLET, DELAYED RELEASE ORAL
COMMUNITY
End: 2019-05-20 | Stop reason: SDUPTHER

## 2019-05-20 RX ORDER — LORATADINE 10 MG/1
10 TABLET ORAL
COMMUNITY
End: 2020-07-01

## 2019-05-20 NOTE — PATIENT INSTRUCTIONS
Preventing Falls: Care Instructions  Your Care Instructions    Getting around your home safely can be a challenge if you have injuries or health problems that make it easy for you to fall. Loose rugs and furniture in walkways are among the dangers for many older people who have problems walking or who have poor eyesight. People who have conditions such as arthritis, osteoporosis, or dementia also have to be careful not to fall. You can make your home safer with a few simple measures. Follow-up care is a key part of your treatment and safety. Be sure to make and go to all appointments, and call your doctor if you are having problems. It's also a good idea to know your test results and keep a list of the medicines you take. How can you care for yourself at home? Taking care of yourself  · You may get dizzy if you do not drink enough water. To prevent dehydration, drink plenty of fluids, enough so that your urine is light yellow or clear like water. Choose water and other caffeine-free clear liquids. If you have kidney, heart, or liver disease and have to limit fluids, talk with your doctor before you increase the amount of fluids you drink. · Exercise regularly to improve your strength, muscle tone, and balance. Walk if you can. Swimming may be a good choice if you cannot walk easily. · Have your vision and hearing checked each year or any time you notice a change. If you have trouble seeing and hearing, you might not be able to avoid objects and could lose your balance. · Know the side effects of the medicines you take. Ask your doctor or pharmacist whether the medicines you take can affect your balance. Sleeping pills or sedatives can affect your balance. · Limit the amount of alcohol you drink. Alcohol can impair your balance and other senses. · Ask your doctor whether calluses or corns on your feet need to be removed.  If you wear loose-fitting shoes because of calluses or corns, you can lose your balance and fall. · Talk to your doctor if you have numbness in your feet. Preventing falls at home  · Remove raised doorway thresholds, throw rugs, and clutter. Repair loose carpet or raised areas in the floor. · Move furniture and electrical cords to keep them out of walking paths. · Use nonskid floor wax, and wipe up spills right away, especially on ceramic tile floors. · If you use a walker or cane, put rubber tips on it. If you use crutches, clean the bottoms of them regularly with an abrasive pad, such as steel wool. · Keep your house well lit, especially [place], and outside walkways. Use night-lights in areas such as hallways and bathrooms. Add extra light switches or use remote switches (such as switches that go on or off when you clap your hands) to make it easier to turn lights on if you have to get up during the night. · Install sturdy handrails on stairways. · Move items in your cabinets so that the things you use a lot are on the lower shelves (about waist level). · Keep a cordless phone and a flashlight with new batteries by your bed. If possible, put a phone in each of the main rooms of your house, or carry a cell phone in case you fall and cannot reach a phone. Or, you can wear a device around your neck or wrist. You push a button that sends a signal for help. · Wear low-heeled shoes that fit well and give your feet good support. Use footwear with nonskid soles. Check the heels and soles of your shoes for wear. Repair or replace worn heels or soles. · Do not wear socks without shoes on wood floors. · Walk on the grass when the sidewalks are slippery. If you live in an area that gets snow and ice in the winter, sprinkle salt on slippery steps and sidewalks. Preventing falls in the bath  · Install grab bars and nonskid mats inside and outside your shower or tub and near the toilet and sinks. · Use shower chairs and bath benches.   · Use a hand-held shower head that will allow you to sit while showering. · Get into a tub or shower by putting the weaker leg in first. Get out of a tub or shower with your strong side first.  · Repair loose toilet seats and consider installing a raised toilet seat to make getting on and off the toilet easier. · Keep your bathroom door unlocked while you are in the shower. Where can you learn more? Go to http://mars-mirta.info/. Enter [phone] in the search box to learn more about \"Preventing Falls: Care Instructions. \"  Current as of: March 15, 2018  Content Version: 11.9  © 1963-3322 [employer]. Care instructions adapted under license by [employer] (which disclaims liability or warranty for this information). If you have questions about a medical condition or this instruction, always ask your healthcare professional. [place] [address] any warranty or liability for your use of this information. Please remember to bring a copy of your advance medical directive with you to your next appointment so that we may update your chart with this important information. Thank you. Piriformis Syndrome: Exercises  Your Care Instructions  Here are some examples of typical rehabilitation exercises for your condition. Start each exercise slowly. Ease off the exercise if you start to have pain. Your doctor or physical therapist will tell you when you can start these exercises and which ones will work best for you. How to do the exercises  Hip rotator stretch    1. Lie on your back with both knees bent and your feet flat on the floor. 2. Put the ankle of your affected leg on your opposite thigh near your knee. 3. Use your hand to gently push your knee (on your affected leg) away from your body until you feel a gentle stretch around your hip. 4. Hold the stretch for 15 to 30 seconds. 5. Repeat 2 to 4 times. 6. Switch legs and repeat steps 1 through 5. Piriformis stretch    1.  Lie on your back with your legs straight. 2. Lift your affected leg and bend your knee. With your opposite hand, reach across your body, and then gently pull your knee toward your opposite shoulder. 3. Hold the stretch for 15 to 30 seconds. 4. Repeat with your other leg. 5. Repeat 2 to 4 times on each side. Lower abdominal strengthening    1. Lie on your back with your knees bent and your feet flat on the floor. 2. Tighten your belly muscles by pulling your belly button in toward your spine. 3. Lift one foot off the floor and bring your knee toward your chest, so that your knee is straight above your hip and your leg is bent like the letter \"L. \"  4. Lift the other knee up to the same position. 5. Lower one leg at a time to the starting position. 6. Keep alternating legs until you have lifted each leg 8 to 12 times. 7. Be sure to keep your belly muscles tight and your back still as you are moving your legs. Be sure to breathe normally. Follow-up care is a key part of your treatment and safety. Be sure to make and go to all appointments, and call your doctor if you are having problems. It's also a good idea to know your test results and keep a list of the medicines you take. Current as of: September 20, 2018  Content Version: 11.9  © 0189-7966 [employer]. Care instructions adapted under license by [employer] (which disclaims liability or warranty for this information). If you have questions about a medical condition or this instruction, always ask your healthcare professional. [place] [address] any warranty or liability for your use of this information. Medicare Wellness Visit, Female     The best way to live healthy is to have a lifestyle where you eat a well-balanced diet, exercise regularly, limit alcohol use, and quit all forms of tobacco/nicotine, if applicable. Regular preventive services are another way to keep healthy.  Preventive services (vaccines, screening tests, monitoring & exams) can help personalize your care plan, which helps you manage your own care. Screening tests can find health problems at the earliest stages, when they are easiest to treat. [facility] follows the current, evidence-based guidelines published by the [facility] ([facility]) when recommending preventive services for our patients. Because we follow these guidelines, sometimes recommendations change over time as research supports it. (For example, mammograms used to be recommended annually. Even though Medicare will still pay for an annual mammogram, the newer guidelines recommend a mammogram every two years for women of average risk.)  Of course, you and your doctor may decide to screen more often for some diseases, based on your risk and your health status. Preventive services for you include:  - Medicare offers their members a free annual wellness visit, which is time for you and your primary care provider to discuss and plan for your preventive service needs. Take advantage of this benefit every year!  -All adults over the age of 72 should receive the recommended pneumonia vaccines. Current USPSTF guidelines recommend a series of two vaccines for the best pneumonia protection.   -All adults should have a flu vaccine yearly and a tetanus vaccine every 10 years. All adults age 61 and older should receive a shingles vaccine once in their lifetime.    -A bone mass density test is recommended when a woman turns 65 to screen for osteoporosis. This test is only recommended one time, as a screening. Some providers will use this same test as a disease monitoring tool if you already have osteoporosis.   -All adults age 38-68 who are overweight should have a diabetes screening test once every three years.   -Other screening tests and preventive services for persons with diabetes include: an eye exam to screen for diabetic retinopathy, a kidney function test, a foot exam, and stricter control over your cholesterol.   -Cardiovascular screening for adults with routine risk involves an electrocardiogram (ECG) at intervals determined by your doctor.   -Colorectal cancer screenings should be done for adults age 54-65 with no increased risk factors for colorectal cancer. There are a number of acceptable methods of screening for this type of cancer. Each test has its own benefits and drawbacks. Discuss with your doctor what is most appropriate for you during your annual wellness visit. The different tests include: colonoscopy (considered the best screening method), a fecal occult blood test, a fecal DNA test, and sigmoidoscopy. -Breast cancer screenings are recommended every other year for women of normal risk, age 54-69.  -Cervical cancer screenings for women over age 72 are only recommended with certain risk factors.   -All adults born [facility] should be screened once for Hepatitis C.      Here is a list of your current Health Maintenance items (your personalized list of preventive services) with a due date:  Health Maintenance Due   Topic Date Due    Annual Well Visit  05/19/2019

## 2019-05-21 ENCOUNTER — HOSPITAL ENCOUNTER (OUTPATIENT)
Dept: MAMMOGRAPHY | Age: 74
Discharge: HOME | End: 2019-05-21
Attending: INTERNAL MEDICINE
Payer: MEDICARE

## 2019-05-21 DIAGNOSIS — Z12.39 BREAST SCREENING, UNSPECIFIED: ICD-10-CM

## 2019-05-21 PROCEDURE — 77067 SCR MAMMO BI INCL CAD: CPT

## 2019-05-21 NOTE — PROGRESS NOTES
This is the Subsequent Medicare Annual Wellness Exam, performed 12 months or more after the Initial AWV or the last Subsequent AWV    I have reviewed the patient's medical history in detail and updated the computerized patient record. As well as a followup of her health issues. Neena Rodríguez yesterday chasing a cat and seen in the ER at [facility] and xrays of knees and pelvis remarkable for DJD only. Has recently had injections in her knees with Synvisc that did help. She had stopped taking Voltaren as she did not need it. She is continued to have some pain overnight in the left knee and left thigh. Prior to that she been doing well. No seizures. No headaches. She does have some intermittent episodes of palpitations. They seem to occur for several days in a row and are unrelated to exertion. No exertional chest pain. History     Past Medical History:   Diagnosis Date    Asthma     Cerebral meningioma ([address] resection 1992    GERD (gastroesophageal reflux disease)     Other ill-defined conditions(799.89) 1992    brain tumor-benign    Thromboembolus ([address]     PE x2      Past Surgical History:   Procedure Laterality Date    EXCIS INFRATENT MENINGIOMA      FOREARM/WRIST SURGERY UNLISTED  2010    Right    HX ADENOIDECTOMY      HX CHOLECYSTECTOMY      HX GYN      ectopic pregnancy    HX HEENT  1992    removed brain tumor and plate inserted    HX HERNIA REPAIR      bilateral    HX ORTHOPAEDIC      bilateral knee surgery    HX TONSILLECTOMY      ND COLONOSCOPY FLX DX W/COLLJ SPEC WHEN PFRMD  1/13/2011         TREAT ECTOPIC PREG,ABD PREG       Current Outpatient Medications   Medication Sig Dispense Refill    hylan G-F 20 (SYNVISC-ONE IX) by Intra artICUlar route every 6 months.  loratadine (CLARITIN) 10 mg tablet Take 10 mg by mouth.  acetaminophen (TYLENOL EXTRA STRENGTH) 500 mg tablet Take  by mouth every six (6) hours as needed for Pain.       diclofenac EC (VOLTAREN) 75 mg EC tablet Take 1 Tab by mouth two (2) times a day. 60 Tab 2    DILANTIN EXTENDED 100 mg ER capsule TAKE 3 CAPSULES DAILY 270 Cap 0    calcium carbonate (TUMS) 200 mg calcium (500 mg) chew Take 1 Tab by mouth daily.  MINOXIDIL (ROGAINE EX) by Apply Externally route.  cholecalciferol (VITAMIN D3) 1,000 unit tablet Take  by mouth daily.  omega-3 fatty acids-vitamin e (FISH OIL) 1,000 mg cap Take 1 Cap by mouth.  Biotin 2,500 mcg cap Take  by mouth.  folic acid (FOLVITE) 1 mg tablet Take 0.4 mg by mouth daily.        Allergies   Allergen Reactions    Epinephrine Unknown (comments)    Sulfa (Sulfonamide Antibiotics) Other (comments)     Kidneys shut down    Ephedrine Palpitations    Nsaids (Non-Steroidal Anti-Inflammatory Drug) Diarrhea     Indigestion, nausea    Ampicillin Unknown (comments)    Codeine Unknown (comments)    Demerol [Meperidine] Unknown (comments)    Dilaudid [Hydromorphone (Bulk)] Unknown (comments)    Erythromycin Unknown (comments)    Levaquin [Levofloxacin] Other (comments)     GI upset    Lidocaine Shortness of Breath    Ciprofloxacin Nausea and Vomiting     Family History   Problem Relation Age of Onset    Heart Attack Father     Other Father         Gi bleed    Heart Disease Father         MI  74yo   Aundria Dadds Other Mother         Pneumonia    Heart Disease Mother         CHF/pacemaker    Breast Cancer Maternal Aunt 79     Social History     Tobacco Use    Smoking status: Former Smoker     Packs/day: 1.00     Years: 10.00     Pack years: 10.00     Types: Cigarettes     Last attempt to quit: 1973     Years since quittin.3    Smokeless tobacco: Never Used    Tobacco comment:    Substance Use Topics    Alcohol use: No     Alcohol/week: 0.0 oz     Patient Active Problem List   Diagnosis Code    Seizure ([facility] [address] R56.9    Screen for colon cancer Z12.11    Seizure disorder ([facility] [address] [facility] G40.909    Recurrent UTI N39.0    Chest pain R07.9    Abnormal EKG R94.31   ROS - Per HPI  Physical Examination: General appearance - alert, well appearing, and in no distress  Eyes - pupils equal and reactive, extraocular eye movements intact  Ears - bilateral TM's and external ear canals normal  Nose - normal and patent, no erythema, discharge or polyps  Mouth - mucous membranes moist, pharynx normal without lesions  Neck - supple, no significant adenopathy  Lymphatics - no palpable lymphadenopathy, no hepatosplenomegaly  Chest - clear to auscultation, no wheezes, rales or rhonchi, symmetric air entry  Heart - normal rate, regular rhythm, normal S1, S2, no murmurs, rubs, clicks or gallops  Abdomen - soft, nontender, nondistended, no masses or organomegaly  Back exam - full range of motion, no tenderness, palpable spasm or pain on motion  Neurological - alert, oriented, normal speech, no focal findings or movement disorder noted  Musculoskeletal - abnormal exam of left thigh with tenderness across the IT band. Some tenderness across the piriformis as well. Normal range of motion with negative straight leg raising., abnormal exam of both knees with crepitus as well as medial joint line tenderness bilaterally. No effusions. Extremities - peripheral pulses normal, no pedal edema, no clubbing or cyanosis      Depression Risk Factor Screening:     3 most recent PHQ Screens 5/20/2019   Little interest or pleasure in doing things Not at all   Feeling down, depressed, irritable, or hopeless Not at all   Total Score PHQ 2 0     Alcohol Risk Factor Screening: You do not drink alcohol or very rarely. Functional Ability and Level of Safety:   Hearing Loss  Hearing is good. Activities of Daily Living  The home contains: no safety equipment. Patient does total self care    Fall Risk  Fall Risk Assessment, last 12 mths 5/20/2019   Able to walk? Yes   Fall in past 12 months? Yes   Fall with injury?  No   Number of falls in past 12 months 2   Fall Risk Score 2       Abuse Screen  Patient is not abused    Cognitive Screening   Evaluation of Cognitive Function:  Has your family/caregiver stated any concerns about your memory: no      Patient Care Team   Patient Care Team:  Tierney Mata MD as PCP - General  Jamie Padilla MD (Ophthalmology)    Assessment/Plan   Education and counseling provided:  Are appropriate based on today's review and evaluation  End-of-Life planning (with patient's consent)  Screening Mammography  Diabetes screening test    Diagnoses and all orders for this visit:    1. Mixed hyperlipidemia-diet controlled. Check labs to be sure this is adequate. -     LIPID PANEL    2. Fasting hyperglycemia -weight is down intentionally. Will recheck blood work to be sure sugars are stable. -     CBC WITH AUTOMATED DIFF  -     METABOLIC PANEL, COMPREHENSIVE  -     TSH RFX ON ABNORMAL TO FREE T4    3. Seizure disorder (HCC)-no recurrence. Continue Dilantin.  -     PHENYTOIN    4. Vitamin D deficiency-check vitamin D level to be sure that this remains repleted. -     VITAMIN D, 25 HYDROXY    5. Age-related osteoporosis without current pathological fracture-repeat bone density to determine stability.  -     DEXA BONE DENSITY STUDY AXIAL; Future    6. Palpitations-she has had a previous stress test that was normal.  Will check an event monitor to be sure this is not worrisome. She brings in her monitor from her apple watch and it does show multiple PVCs. -     EVENT MONITOR POST SYMPTOMS; Future    7. Medicare annual wellness visit, subsequent    8. Soft tissue injury to the leg.? Also piriformis syndrome. Will treat with stretching exercises and diclofenac. If symptoms persist would consider physical therapy. Other orders  -     diclofenac EC (VOLTAREN) 75 mg EC tablet; Take 1 Tab by mouth two (2) times a day.         Health Maintenance Due   Topic Date Due    MEDICARE YEARLY EXAM  05/19/2019

## 2019-05-22 ENCOUNTER — HOSPITAL ENCOUNTER (OUTPATIENT)
Dept: LAB | Age: 74
Discharge: HOME | End: 2019-05-22
Payer: MEDICARE

## 2019-05-22 PROCEDURE — 36415 COLL VENOUS BLD VENIPUNCTURE: CPT

## 2019-05-22 PROCEDURE — 84443 ASSAY THYROID STIM HORMONE: CPT

## 2019-05-22 PROCEDURE — 80185 ASSAY OF PHENYTOIN TOTAL: CPT

## 2019-05-22 PROCEDURE — 82306 VITAMIN D 25 HYDROXY: CPT

## 2019-05-22 PROCEDURE — 80053 COMPREHEN METABOLIC PANEL: CPT

## 2019-05-22 PROCEDURE — 85025 COMPLETE CBC W/AUTO DIFF WBC: CPT

## 2019-05-22 PROCEDURE — 80061 LIPID PANEL: CPT

## 2019-05-23 LAB
25(OH)D3+25(OH)D2 SERPL-MCNC: 40.4 NG/ML (ref 30–100)
ALBUMIN SERPL-MCNC: 4.2 G/DL (ref 3.5–4.8)
ALBUMIN/GLOB SERPL: 1.8 {RATIO} (ref 1.2–2.2)
ALP SERPL-CCNC: 160 IU/L (ref 39–117)
ALT SERPL-CCNC: 14 IU/L (ref 0–32)
AST SERPL-CCNC: 17 IU/L (ref 0–40)
BASOPHILS # BLD AUTO: 0 X10E3/UL (ref 0–0.2)
BASOPHILS NFR BLD AUTO: 0 %
BILIRUB SERPL-MCNC: 0.2 MG/DL (ref 0–1.2)
BUN SERPL-MCNC: 10 MG/DL (ref 8–27)
BUN/CREAT SERPL: 20 (ref 12–28)
CALCIUM SERPL-MCNC: 9.6 MG/DL (ref 8.7–10.3)
CHLORIDE SERPL-SCNC: 105 MMOL/L (ref 96–106)
CHOLEST SERPL-MCNC: 201 MG/DL (ref 100–199)
CO2 SERPL-SCNC: 26 MMOL/L (ref 20–29)
CREAT SERPL-MCNC: 0.51 MG/DL (ref 0.57–1)
EOSINOPHIL # BLD AUTO: 0.1 X10E3/UL (ref 0–0.4)
EOSINOPHIL NFR BLD AUTO: 2 %
ERYTHROCYTE [DISTWIDTH] IN BLOOD BY AUTOMATED COUNT: 14.3 % (ref 12.3–15.4)
GLOBULIN SER CALC-MCNC: 2.4 G/DL (ref 1.5–4.5)
GLUCOSE SERPL-MCNC: 90 MG/DL (ref 65–99)
HCT VFR BLD AUTO: 39 % (ref 34–46.6)
HDLC SERPL-MCNC: 86 MG/DL
HGB BLD-MCNC: 13 G/DL (ref 11.1–15.9)
IMM GRANULOCYTES # BLD AUTO: 0 X10E3/UL (ref 0–0.1)
IMM GRANULOCYTES NFR BLD AUTO: 0 %
LDLC SERPL CALC-MCNC: 100 MG/DL (ref 0–99)
LYMPHOCYTES # BLD AUTO: 1.6 X10E3/UL (ref 0.7–3.1)
LYMPHOCYTES NFR BLD AUTO: 26 %
MCH RBC QN AUTO: 30.2 PG (ref 26.6–33)
MCHC RBC AUTO-ENTMCNC: 33.3 G/DL (ref 31.5–35.7)
MCV RBC AUTO: 91 FL (ref 79–97)
MONOCYTES # BLD AUTO: 0.5 X10E3/UL (ref 0.1–0.9)
MONOCYTES NFR BLD AUTO: 8 %
NEUTROPHILS # BLD AUTO: 4.1 X10E3/UL (ref 1.4–7)
NEUTROPHILS NFR BLD AUTO: 64 %
PHENYTOIN SERPL-MCNC: 13.6 UG/ML (ref 10–20)
PLATELET # BLD AUTO: 180 X10E3/UL (ref 150–450)
POTASSIUM SERPL-SCNC: 4.5 MMOL/L (ref 3.5–5.2)
PROT SERPL-MCNC: 6.6 G/DL (ref 6–8.5)
RBC # BLD AUTO: 4.3 X10E6/UL (ref 3.77–5.28)
SODIUM SERPL-SCNC: 143 MMOL/L (ref 134–144)
TRIGL SERPL-MCNC: 73 MG/DL (ref 0–149)
TSH SERPL DL<=0.005 MIU/L-ACNC: 2.59 UIU/ML (ref 0.45–4.5)
VLDLC SERPL CALC-MCNC: 15 MG/DL (ref 5–40)
WBC # BLD AUTO: 6.4 X10E3/UL (ref 3.4–10.8)

## 2019-05-28 ENCOUNTER — OFFICE VISIT (OUTPATIENT)
Dept: OBGYN CLINIC | Age: 74
End: 2019-05-28

## 2019-05-28 VITALS — DIASTOLIC BLOOD PRESSURE: 92 MMHG | WEIGHT: 170.13 LBS | SYSTOLIC BLOOD PRESSURE: 140 MMHG | BODY MASS INDEX: 29.2 KG/M2

## 2019-05-28 DIAGNOSIS — Z01.419 ENCOUNTER FOR GYNECOLOGICAL EXAMINATION WITHOUT ABNORMAL FINDING: Primary | ICD-10-CM

## 2019-05-28 RX ORDER — CLOBETASOL PROPIONATE 0.5 MG/G
CREAM TOPICAL 2 TIMES DAILY
Qty: 15 G | Refills: 0 | Status: SHIPPED | OUTPATIENT
Start: 2019-05-28 | End: 2021-09-02 | Stop reason: ALTCHOICE

## 2019-05-28 NOTE — PATIENT INSTRUCTIONS

## 2019-05-28 NOTE — PROGRESS NOTES
Annual exam    Coco Rader is a 74 y.o. presenting for annual exam.   Her main concerns today include some burning in vaginal area at times. Lichen sclerosis-was previously biopsied and treated by Dr. Lorena Hassan in the [facility]. Ob/Gyn Hx:  No obstetric history on file. No LMP recorded.  Patient is postmenopausal.  STI- no  ?SA-not currently    Health maintenance:  Pap-2 years ago  Mammo-1 week ago  Colonoscopy-   Dexa- 2 years ago    Past Medical History:   Diagnosis Date    Asthma     Bronchitis     Cerebral [facility]ioma ([facility] resection 1992    Cytomegalovirus ([facility] [facility]     Nohemi-Danlos syndrome     GERD (gastroesophageal reflux disease)     Osteoarthritis     Other ill-defined conditions(799.89) 1992    brain tumor-benign    Pericarditis, acute     Thromboembolus (HCC)     PE x2       Past Surgical History:   Procedure Laterality Date    EXCIS INFRATENT MENINGIOMA      FOREARM/WRIST SURGERY UNLISTED  2010    Right    HX ADENOIDECTOMY      HX CHOLECYSTECTOMY      HX GYN      ectopic pregnancy    HX HEENT  1992    removed brain tumor and plate inserted    HX HERNIA REPAIR      bilateral    HX ORTHOPAEDIC      bilateral knee surgery    HX TONSILLECTOMY      [place] COLONOSCOPY FLX DX W/COLLJ SPEC WHEN PFRMD  1/13/2011         TREAT ECTOPIC PREG,ABD PREG         Family History   Problem Relation Age of Onset    Heart Attack Father     Other Father         Gi bleed    Heart Disease Father         MI  76yo   [facility] Other Mother         Pneumonia    Heart Disease Mother         CHF/pacemaker    Breast Cancer Maternal Aunt 79       Social History     Socioeconomic History    Marital status:      Spouse name: Not on file    Number of children: Not on file    Years of education: Not on file    Highest education level: Not on file   Occupational History    Not on file   Social Needs    Financial resource strain: Not on file    Food insecurity:     Worry: Not on file     Inability: Not on file    Transportation needs:     Medical: Not on file     Non-medical: Not on file   Tobacco Use    Smoking status: Former Smoker     Packs/day: 1.00     Years: 10.00     Pack years: 10.00     Types: Cigarettes     Last attempt to quit: 1973     Years since quittin.4    Smokeless tobacco: Never Used    Tobacco comment:    Substance and Sexual Activity    Alcohol use: No     Alcohol/week: 0.0 oz    Drug use: No    Sexual activity: Not Currently   Lifestyle    Physical activity:     Days per week: Not on file     Minutes per session: Not on file    Stress: Not on file   Relationships    Social connections:     Talks on phone: Not on file     Gets together: Not on file     Attends Christianity service: Not on file     Active member of club or organization: Not on file     Attends meetings of clubs or organizations: Not on file     Relationship status: Not on file    Intimate partner violence:     Fear of current or ex partner: Not on file     Emotionally abused: Not on file     Physically abused: Not on file     Forced sexual activity: Not on file   Other Topics Concern    Not on file   Social History Narrative    Not on file       Current Outpatient Medications   Medication Sig Dispense Refill    loratadine (CLARITIN) 10 mg tablet Take 10 mg by mouth.  acetaminophen (TYLENOL EXTRA STRENGTH) 500 mg tablet Take  by mouth every six (6) hours as needed for Pain.  diclofenac EC (VOLTAREN) 75 mg EC tablet Take 1 Tab by mouth two (2) times a day. 60 Tab 2    DILANTIN EXTENDED 100 mg ER capsule TAKE 3 CAPSULES DAILY 270 Cap 0    calcium carbonate (TUMS) 200 mg calcium (500 mg) chew Take 1 Tab by mouth daily.  MINOXIDIL (ROGAINE EX) by Apply Externally route.  cholecalciferol (VITAMIN D3) 1,000 unit tablet Take  by mouth daily.  omega-3 fatty acids-vitamin e (FISH OIL) 1,000 mg cap Take 1 Cap by mouth.  Biotin 2,500 mcg cap Take  by mouth.         folic acid (FOLVITE) 1 mg tablet Take 0.4 mg by mouth daily.  hylan G-F 20 (SYNVISC-ONE IX) by Intra artICUlar route every 6 months. Allergies   Allergen Reactions    Epinephrine Unknown (comments)    Sulfa (Sulfonamide Antibiotics) Other (comments)     Kidneys shut down    Ephedrine Palpitations    Nsaids (Non-Steroidal Anti-Inflammatory Drug) Diarrhea     Indigestion, nausea    Advil [Ibuprofen] Other (comments)     Fluid retention.     Ampicillin Unknown (comments)    Codeine Unknown (comments)    Demerol [Meperidine] Unknown (comments)    Dilaudid [Hydromorphone (Bulk)] Unknown (comments)    Erythromycin Unknown (comments)    Levaquin [Levofloxacin] Other (comments)     GI upset    Lidocaine Shortness of Breath    Ciprofloxacin Nausea and Vomiting       Review of Systems - History obtained from the patient  Constitutional: negative for weight loss, fever, night sweats  HEENT: negative for hearing loss, earache, congestion, snoring, sorethroat  CV: negative for chest pain, palpitations, edema  Resp: negative for cough, shortness of breath, wheezing  GI: negative for change in bowel habits, abdominal pain, black or bloody stools  : negative for frequency, dysuria, hematuria, vaginal discharge  MSK: negative for back pain, joint pain, muscle pain  Breast: negative for breast lumps, nipple discharge, galactorrhea  Skin :negative for itching, rash, hives  Neuro: negative for dizziness, headache, confusion, weakness  Psych: negative for anxiety, depression, change in mood  Heme/lymph: negative for bleeding, bruising, pallor    Physical Exam    Visit Vitals  BP (!) 140/92 (BP 1 Location: Right arm, BP Patient Position: Sitting)   Wt 170 lb 2 oz (77.2 kg)   BMI 29.20 kg/m²       Constitutional  · Appearance: well-nourished, well developed, alert, in no acute distress    HENT  · Head and Face: appears normal    Neck  · Inspection/Palpation: normal appearance, no masses or tenderness  · Lymph Nodes: no lymphadenopathy present  · Thyroid: gland size normal, nontender, no nodules or masses present on palpation    Chest  · Respiratory Effort: non-labored breathing  · Auscultation: CTAB, normal breath sounds    Cardiovascular  · Heart:  · Auscultation: regular rate and rhythm without murmur  · Extremities: no peripheral edema    Breasts  · Inspection of Breasts: breasts symmetrical, no skin changes, no discharge present, nipple appearance normal, no skin retraction present  · Palpation of Breasts and Axillae: no masses present on palpation, no breast tenderness  · Axillary Lymph Nodes: no lymphadenopathy present    Gastrointestinal  · Abdominal Examination: abdomen non-tender to palpation, normal bowel sounds, no masses present  · Liver and spleen: no hepatomegaly present, spleen not palpable  · Hernias: no hernias identified    Genitourinary  · External Genitalia: normal appearance for age, no discharge present, no tenderness present, no inflammatory lesions present, no masses present, +atrophy of UG mucosa, lichen sclerosis at posterior fourchette  · Vagina: normal vaginal vault without central or paravaginal defects, no discharge present, no inflammatory lesions present, no masses present  · Bladder: non-tender to palpation  · Urethra: appears normal  · Cervix: normal   · Uterus: normal size, shape and consistency, small, mobile  · Adnexa: no adnexal tenderness present, no adnexal masses present  · Perineum: perineum within normal limits, no evidence of trauma, no rashes or skin lesions present    Skin  · General Inspection: no rash, no lesions identified    Neurologic/Psychiatric  · Mental Status:  · Orientation: grossly oriented to person, place and time  · Mood and Affect: mood normal, affect appropriate      Assessment/Plan:  74 y.o. postmenopausal female overall doing well.      Health Maintenance:  -counseled re: diet, exercise, healthy lifestyle  Temovate rx sent  Reviewed mammo up to date last month  Her bone density is scheduled for October    RTC: 1 year for annual wellness assessment, or sooner prn for problems or concerns  -handouts and instructions provided    Jesus Fregoso  5/28/2019  1:24 PM     Signed By: Ina Olivera MD     May 28, 2019

## 2019-05-29 ENCOUNTER — HOSPITAL ENCOUNTER (OUTPATIENT)
Dept: NON INVASIVE DIAGNOSTICS | Age: 74
Discharge: HOME | End: 2019-05-29
Attending: INTERNAL MEDICINE
Payer: MEDICARE

## 2019-05-29 DIAGNOSIS — R00.2 PALPITATIONS: ICD-10-CM

## 2019-05-29 PROCEDURE — 93271 ECG/MONITORING AND ANALYSIS: CPT

## 2019-07-12 DIAGNOSIS — G40.909 SEIZURE DISORDER (HCC): ICD-10-CM

## 2019-07-12 RX ORDER — PHENYTOIN SODIUM 100 MG/1
CAPSULE, EXTENDED RELEASE ORAL
Qty: 270 CAP | Refills: 1 | Status: SHIPPED | COMMUNITY
Start: 2019-07-12 | End: 2020-01-06

## 2019-10-29 ENCOUNTER — HOSPITAL ENCOUNTER (OUTPATIENT)
Dept: BONE DENSITY | Age: 74
Discharge: HOME | End: 2019-10-29
Attending: INTERNAL MEDICINE
Payer: MEDICARE

## 2019-10-29 DIAGNOSIS — M81.0 AGE-RELATED OSTEOPOROSIS WITHOUT CURRENT PATHOLOGICAL FRACTURE: ICD-10-CM

## 2019-10-29 PROCEDURE — 77080 DXA BONE DENSITY AXIAL: CPT

## 2020-01-06 DIAGNOSIS — G40.909 SEIZURE DISORDER (HCC): ICD-10-CM

## 2020-01-06 RX ORDER — PHENYTOIN SODIUM 100 MG/1
CAPSULE, EXTENDED RELEASE ORAL
Qty: 270 CAP | Refills: 4 | Status: SHIPPED | OUTPATIENT
Start: 2020-01-06 | End: 2021-03-15

## 2020-05-01 ENCOUNTER — CLINICAL SUPPORT (OUTPATIENT)
Dept: CARDIOLOGY CLINIC | Age: 75
End: 2020-05-01

## 2020-05-01 ENCOUNTER — TELEPHONE (OUTPATIENT)
Dept: INTERNAL MEDICINE CLINIC | Age: 75
End: 2020-05-01

## 2020-05-01 ENCOUNTER — TELEPHONE (OUTPATIENT)
Dept: CARDIOLOGY CLINIC | Age: 75
End: 2020-05-01

## 2020-05-01 ENCOUNTER — DOCUMENTATION ONLY (OUTPATIENT)
Dept: CARDIOLOGY CLINIC | Age: 75
End: 2020-05-01

## 2020-05-01 DIAGNOSIS — R00.2 PALPITATIONS: ICD-10-CM

## 2020-05-01 DIAGNOSIS — R00.2 PALPITATIONS: Primary | ICD-10-CM

## 2020-05-01 NOTE — TELEPHONE ENCOUNTER
Chief Complaint   Patient presents with    Palpitations     Feels irregular heart rate every 15-20 minutes - states has known history - states neg holter in past - states over the past 10 days has gotten more frequent to constant. Apple watch picking up rates 50's-70's but states \"inconclusive contact your doctor\". Reports SOB and a pounding sensation on left side of neck. Pt has cardiologist Dr. Arroyo Apo - has not contacted them. She denies all other sx. Advised will review w/ PCP and return call, red flags to warrant 911 reviewed, pt verbalized understanding. Recent Travel Screening and Travel History documentation     Travel Screening       Question Response     In the last month, have you been in contact with someone who was confirmed or suspected to have Coronavirus / COVID-19? No / Unsure     Do you have any of the following symptoms? Shortness of breath     Have you traveled internationally in the last month?  No      Travel History   Travel since 04/01/20     No documented travel since 04/01/20

## 2020-05-01 NOTE — TELEPHONE ENCOUNTER
Patient is interested in scheduling VV with Dr. Joseph Núñez. She was last seen in 2016. She has been experiencing irregular heart beat and was told by PCP to see Dr. Joseph Núñez.      Phone: [phone]

## 2020-05-01 NOTE — TELEPHONE ENCOUNTER
Called patient and let her know we have 48 hr holters. Scheduled her to come in at 2 today to have it applied. Patient verbalized understanding and denied further questions or concerns.

## 2020-05-01 NOTE — TELEPHONE ENCOUNTER
Dr. Gely Hoskins also received message from Dr. Tyree Eid and wants to set her up with a holter or event monitor, depending on frequency of sxs, then schedule VV within 1 month. Called patient. Verified patient's identity with two identifiers. Patient stated she has been having sxs daily. She agreed to 48 hour monitor. I told her I would check to see if Evelia Eldridge had any and will call her back. Went ahead and scheduled virtual visit for 5/28. I explained we will call her before then when we get results of monitor. I will call patient back about monitor.     Future Appointments   Date Time Provider [facility]   5/20/2020  1:30 PM Mac Santos MD [id number] [facility]   5/28/2020  2:40 PM Ezekiel Hahn  [address]

## 2020-05-01 NOTE — TELEPHONE ENCOUNTER
Sonu Webber MD  You 11 minutes ago (11:46 AM)      I would suggest cardiology when she can. Only seek ER if need be for prolonged episodes or other symptoms with it like chest pain, SOB, or dizziness. Returned call to patient and advised of SRJ above recommendations. She will call her cardiologist Dr. Mary Painter office now.

## 2020-05-01 NOTE — PROGRESS NOTES
See notes from dr Tiny Jeffries office. If palptiations are everyday, 48hr holter.  If more sporadic, 30 day event and either way set up virtual new pt visit within a month. havent seen since 2016

## 2020-05-07 ENCOUNTER — TELEPHONE (OUTPATIENT)
Dept: CARDIOLOGY CLINIC | Age: 75
End: 2020-05-07

## 2020-05-07 NOTE — TELEPHONE ENCOUNTER
Called patient. Left message on voicemail. Will notify patient of results and Dr. Elizabeth Romo message.

## 2020-05-07 NOTE — TELEPHONE ENCOUNTER
Dr. Bain Dk-  Patient's holter monitor results are on your desk for review.     Future Appointments   Date Time Provider [facility]   5/20/2020  1:30 PM Betty Poole MD [id number] [facility]   5/28/2020  2:40 PM Rosalea Runner,  [address]

## 2020-05-07 NOTE — TELEPHONE ENCOUNTER
Patient called back. Verified patient's identity with two identifiers. Notified patient of results and Dr. Angelo Davis message. She said she will hold off on trying medication for now. Her sxs are bothersome sometimes, but she is worried to take medication. She will call back if she changes her mind and wants to try. Explained it was a low dose. She will keep scheduled appointment with Dr. Anupama Long to discuss everything further and denied further questions or concerns.

## 2020-05-07 NOTE — TELEPHONE ENCOUNTER
Occasional skipped beats(pvcs) but nothing dangerous or to worry about. Could try toprol xl 25 mg per day to see if that will control symptoms.

## 2020-05-20 ENCOUNTER — VIRTUAL VISIT (OUTPATIENT)
Dept: INTERNAL MEDICINE CLINIC | Age: 75
End: 2020-05-20

## 2020-05-20 DIAGNOSIS — E78.2 MIXED HYPERLIPIDEMIA: ICD-10-CM

## 2020-05-20 DIAGNOSIS — R51.9 CHRONIC NONINTRACTABLE HEADACHE, UNSPECIFIED HEADACHE TYPE: ICD-10-CM

## 2020-05-20 DIAGNOSIS — Z00.00 MEDICARE ANNUAL WELLNESS VISIT, SUBSEQUENT: Primary | ICD-10-CM

## 2020-05-20 DIAGNOSIS — G89.29 CHRONIC NONINTRACTABLE HEADACHE, UNSPECIFIED HEADACHE TYPE: ICD-10-CM

## 2020-05-20 DIAGNOSIS — R56.9 SEIZURE (HCC): ICD-10-CM

## 2020-05-20 DIAGNOSIS — R00.2 PALPITATIONS: ICD-10-CM

## 2020-05-20 DIAGNOSIS — R73.01 FASTING HYPERGLYCEMIA: ICD-10-CM

## 2020-05-20 DIAGNOSIS — M81.0 AGE-RELATED OSTEOPOROSIS WITHOUT CURRENT PATHOLOGICAL FRACTURE: ICD-10-CM

## 2020-05-20 NOTE — PROGRESS NOTES
Kwame Bray is a 66 y.o. female who was seen by synchronous (real-time) audio-video technology on 5/20/2020. Consent: Kwame Bray who was seen by synchronous (real-time) audio-video technology, and/or her healthcare decision maker, is aware that this patient-initiated, Telehealth encounter on 5/20/2020 is a billable service, with coverage as determined by her insurance carrier. She is aware that she may receive a bill and has provided verbal consent to proceed: Yes. Patient identification was verified prior to start of the visit. A caregiver was present when appropriate. Due to this being a TeleHealth encounter (during [address] emergency), evaluation of the following organ systems was limited: VS/Constituional/EENT/Resp/CV/GI//MS/Neuro/Skin/Heme-Lymph-Imm. Pursuant to the emergency declaration under the [address] [place] [address], [facility] waiver authority and the [employer] Act, this Virtual Visit was conducted, with patient's (and/or legal guardian's) consent, to reduce the patient's risk of exposure to COVID-19 and provide necessary medical care. Services were provided through a synchronous discussion virtually to substitute for in-person clinic visit. I was in the office. The patient was at home. Assessment & Plan:   Diagnoses and all orders for this visit:    1. Medicare annual wellness visit, subsequent    2. Palpitations -with ongoing symptoms. Her recent Holter monitor apparently revealed PVCs. She has had increasing symptoms recently with longer episodes. Will obtain an event monitor to see if we can catch 1 of these longer episodes. She reports that they were not occurring when she wore her recent Holter. She does not wish to stockings until more is documented. -     CARDIAC EVENT MONITOR; Future    3. Seizure ([facility] [address] -no recurrence. Check Dilantin level and continue meds.   -     METABOLIC PANEL, COMPREHENSIVE  -     CBC WITH AUTOMATED DIFF  -     TSH RFX ON ABNORMAL TO FREE T4  -     PHENYTOIN  -     SED RATE (ESR)  -     CRP, HIGH SENSITIVITY    4. Chronic nonintractable headache, unspecified headache type -she noted some left temporal headaches recently. She is unclear if they are related to her previous brain surgery. Will obtain sed rate and C-reactive protein to evaluate for inflammatory vasculitis. -     SED RATE (ESR)  -     CRP, HIGH SENSITIVITY    5. Mixed hyperlipidemia -diet controlled. Check labs for that. -     LIPID PANEL  -     TSH RFX ON ABNORMAL TO FREE T4    6. Fasting hyperglycemia -diet controlled and check labs for that. 7. Age-related osteoporosis without current pathological fracture -discussed her bone density in detail today. She is reluctant to start new medications due to her concerns about side effects. We have elected to repeat a bone density next year and if worsened, she will reconsider. Subjective:   Ashley Bowman was seen for Annual Wellness Visit      Presents for her Medicare wellness as well as her follow-up visit. Over the last few weeks she has had increasing issues with palpitation. Holter monitor done revealed PVCs only but the complete details were not available. She now having episodes that last for hours. She feels as if her heart is beating both quickly and hard. No relationship exertion. No chest pains. Some dyspnea on exertion. No PND or orthopnea. No nausea or vomiting. She does have some intermittent temporal headaches. She notes some tenderness to the touch on exam today. No true vertigo. No fevers or chills. She has ongoing issues with arthritis. She has pain in her knees that have been improved by using Synvisc type injections and wondered about the timing of that. I suggested that she can do those on an as-needed basis or every 6 months as suggested by orthopedics.       Prior to Admission medications    Medication Sig Start Date End Date Taking? Authorizing Provider   DILANTIN EXTENDED 100 mg ER capsule TAKE 3 CAPSULES DAILY 1/6/20  Yes Jenni Portillo III, MD   clobetasol (TEMOVATE) 0.05 % topical cream Apply  to affected area two (2) times a day. 5/28/19  Yes Janet Tyler MD   loratadine (CLARITIN) 10 mg tablet Take 10 mg by mouth. Yes Provider, Historical   acetaminophen (TYLENOL EXTRA STRENGTH) 500 mg tablet Take  by mouth every six (6) hours as needed for Pain. Yes Provider, Historical   cholecalciferol (VITAMIN D3) 1,000 unit tablet Take  by mouth daily. Yes Provider, Historical   omega-3 fatty acids-vitamin e (FISH OIL) 1,000 mg cap Take 1 Cap by mouth. Yes Provider, Historical   Biotin 2,500 mcg cap Take  by mouth. Yes Provider, Historical   folic acid (FOLVITE) 1 mg tablet Take 0.4 mg by mouth daily. Yes Provider, Historical   diclofenac EC (VOLTAREN) 75 mg EC tablet Take 1 Tab by mouth two (2) times a day. 5/20/19 5/20/20  Jenni Portillo III, MD   calcium carbonate (TUMS) 200 mg calcium (500 mg) chew Take 1 Tab by mouth daily. 5/20/20  Provider, Historical   MINOXIDIL (ROGAINE EX) by Apply Externally route. 5/20/20  Provider, Historical       Allergies   Allergen Reactions    Epinephrine Unknown (comments)    Sulfa (Sulfonamide Antibiotics) Other (comments)     Kidneys shut down    Ephedrine Palpitations    Nsaids (Non-Steroidal Anti-Inflammatory Drug) Diarrhea     Indigestion, nausea    Advil [Ibuprofen] Other (comments)     Fluid retention.     Ampicillin Unknown (comments)    Codeine Unknown (comments)    Demerol [Meperidine] Unknown (comments)    Dilaudid [Hydromorphone (Bulk)] Unknown (comments)    Erythromycin Unknown (comments)    Levaquin [Levofloxacin] Other (comments)     GI upset    Lidocaine Shortness of Breath    Ciprofloxacin Nausea and Vomiting       Patient Active Problem List    Diagnosis Date Noted    Chest pain 01/22/2016    Abnormal EKG 01/22/2016    Recurrent UTI 04/02/2014    [facility] disorder ([facility] [address]     [facility]     Current Outpatient Medications   Medication Sig Dispense Refill    DILANTIN EXTENDED 100 mg ER capsule TAKE 3 CAPSULES DAILY 270 Cap 4    clobetasol (TEMOVATE) 0.05 % topical cream Apply  to affected area two (2) times a day. 15 g 0    loratadine (CLARITIN) 10 mg tablet Take 10 mg by mouth.  acetaminophen (TYLENOL EXTRA STRENGTH) 500 mg tablet Take  by mouth every six (6) hours as needed for Pain.  cholecalciferol (VITAMIN D3) 1,000 unit tablet Take  by mouth daily.  omega-3 fatty acids-vitamin e (FISH OIL) 1,000 mg cap Take 1 Cap by mouth.  Biotin 2,500 mcg cap Take  by mouth.  folic acid (FOLVITE) 1 mg tablet Take 0.4 mg by mouth daily. Allergies   Allergen Reactions    Epinephrine Unknown (comments)    Sulfa (Sulfonamide Antibiotics) Other (comments)     Kidneys shut down    Ephedrine Palpitations    Nsaids (Non-Steroidal Anti-Inflammatory Drug) Diarrhea     Indigestion, nausea    Advil [Ibuprofen] Other (comments)     Fluid retention.     Ampicillin Unknown (comments)    Codeine Unknown (comments)    Demerol [Meperidine] Unknown (comments)    Dilaudid [Hydromorphone (Bulk)] Unknown (comments)    Erythromycin Unknown (comments)    Levaquin [Levofloxacin] Other (comments)     GI upset    Lidocaine Shortness of Breath    Ciprofloxacin Nausea and Vomiting     Past Medical History:   Diagnosis Date    Asthma     Bronchitis     [facility]bral [facility]oma ([facility]    Cytomegalovirus ([facility]     Nohemi-Danlos syndrome     GERD (gastroesophageal reflux disease)     Osteoarthritis     Other ill-defined conditions(799.89) 1992    brain tumor-benign    Pericarditis, acute     Throm[facility]embolus ([facility]     PE x2     Past Surgical History:   Procedure Laterality Date    EXCIS INFRATENT MENINGIOMA      FOREARM/WRIST SURGERY UNLISTED  2010    Right    HX ADENOIDECTOMY      HX CHOLECYSTECTOMY      HX GYN      ectopic pregnancy    HX HEENT      removed brain tumor and plate inserted    HX HERNIA REPAIR      bilateral    HX ORTHOPAEDIC      bilateral knee surgery    HX TONSILLECTOMY      [place] COLONOSCOPY FLX DX W/COLLJ SPEC WHEN PFRMD  2011         TREAT ECTOPIC PREG,ABD PREG       Family History   Problem Relation Age of Onset    Heart Attack Father     Other Father         Gi bleed    Heart Disease Father         MI  76yo   [facility] Other Mother         Pneumonia    Heart Disease Mother         CHF/pacemaker    Breast Cancer Maternal Aunt 79     Social History     Tobacco Use    Smoking status: Former Smoker     Packs/day: 1.00     Years: 10.00     Pack years: 10.00     Types: Cigarettes     Last attempt to quit: 1973     Years since quittin.3    Smokeless tobacco: Never Used    Tobacco comment:    Substance Use Topics    Alcohol use: No     Alcohol/week: 0.0 standard drinks          ROS - per HPI      Objective:     General: alert, cooperative, no distress   Mental  status: normal mood, behavior, speech, dress, motor activity, and thought processes, able to follow commands   Eyes: EOM intact, normal sclera   Mouth: not examined   Neck: no visualized mass   Resp: normal effort and no respiratory distress   Neuro: no gross deficits   Musculoskeletal: normal ROM of neck   Skin: no discoloration or lesions of concern on visible areas   Psychiatric: normal affect, no hallucinations         We discussed the expected course, resolution and complications of the diagnosis(es) in detail. Medication risks, benefits, costs, interactions, and alternatives were discussed as indicated. I advised her to contact the office if her condition worsens, changes or fails to improve as anticipated. She expressed understanding with the diagnosis(es) and plan.      Divya Coffey MD

## 2020-05-20 NOTE — PATIENT INSTRUCTIONS

## 2020-05-20 NOTE — PROGRESS NOTES
This is the Subsequent Medicare Annual Wellness Exam, performed 12 months or more after the Initial AWV or the last Subsequent AWV    Consent: Arianna Serratozonia, who was seen by synchronous (real-time) audio-video technology, and/or her healthcare decision maker, is aware that this patient-initiated, Telehealth encounter on 5/20/2020 is a billable service. While AWVs are fully covered by Medicare, any services rendered on this date that are not included in an AWV are subject to additional billing, with coverage as determined by her insurance carrier. She is aware that she may receive a bill for any such additional services and has provided verbal consent to proceed: Yes. I have reviewed the patient's medical history in detail and updated the computerized patient record.      History     Patient Active Problem List   Diagnosis Code    Seizure ([address] R56.9    Seizure disorder ([address] G40.909    Recurrent UTI N39.0    Chest pain R07.9    Abnormal EKG R94.31     Past Medical History:   Diagnosis Date    Asthma     Bronchitis     Cerebral meningioma ([address] resection 1992    Cytomegalovirus ([address]     Nohemi-Danlos syndrome     GERD (gastroesophageal reflux disease)     Osteoarthritis     Other ill-defined conditions(799.89) 1992    brain tumor-benign    Pericarditis, acute     Thromboembolus ([address]     PE x2      Past Surgical History:   Procedure Laterality Date    EXCIS INFRATENT MENINGIOMA      FOREARM/WRIST SURGERY UNLISTED  2010    Right    HX ADENOIDECTOMY      HX CHOLECYSTECTOMY      HX GYN      ectopic pregnancy    HX HEENT  1992    removed brain tumor and plate inserted    HX HERNIA REPAIR      bilateral    HX ORTHOPAEDIC      bilateral knee surgery    HX TONSILLECTOMY      IA COLONOSCOPY FLX DX W/COLLJ SPEC WHEN PFRMD  1/13/2011         TREAT ECTOPIC PREG,ABD PREG       Current Outpatient Medications   Medication Sig Dispense Refill    DILANTIN EXTENDED 100 mg ER capsule TAKE 3 CAPSULES DAILY 270 Cap 4    clobetasol (TEMOVATE) 0.05 % topical cream Apply  to affected area two (2) times a day. 15 g 0    loratadine (CLARITIN) 10 mg tablet Take 10 mg by mouth.  acetaminophen (TYLENOL EXTRA STRENGTH) 500 mg tablet Take  by mouth every six (6) hours as needed for Pain.  cholecalciferol (VITAMIN D3) 1,000 unit tablet Take  by mouth daily.  omega-3 fatty acids-vitamin e (FISH OIL) 1,000 mg cap Take 1 Cap by mouth.  Biotin 2,500 mcg cap Take  by mouth.  folic acid (FOLVITE) 1 mg tablet Take 0.4 mg by mouth daily. Allergies   Allergen Reactions    Epinephrine Unknown (comments)    Sulfa (Sulfonamide Antibiotics) Other (comments)     Kidneys shut down    Ephedrine Palpitations    Nsaids (Non-Steroidal Anti-Inflammatory Drug) Diarrhea     Indigestion, nausea    Advil [Ibuprofen] Other (comments)     Fluid retention.     Ampicillin Unknown (comments)    Codeine Unknown (comments)    Demerol [Meperidine] Unknown (comments)    Dilaudid [Hydromorphone (Bulk)] Unknown (comments)    Erythromycin Unknown (comments)    Levaquin [Levofloxacin] Other (comments)     GI upset    Lidocaine Shortness of Breath    Ciprofloxacin Nausea and Vomiting       Family History   Problem Relation Age of Onset    Heart Attack Father     Other Father         Gi bleed    Heart Disease Father         [place]  76yo   [facility] Other Mother         Pneumonia    Heart Disease Mother         CHF/pacemaker    Breast Cancer Maternal Aunt 79     Social History     Tobacco Use    Smoking status: Former Smoker     Packs/day: 1.00     Years: 10.00     Pack years: 10.00     Types: Cigarettes     Last attempt to quit: 1973     Years since quittin.3    Smokeless tobacco: Never Used    Tobacco comment:    Substance Use Topics    Alcohol use: No     Alcohol/week: 0.0 standard drinks       Depression Risk Factor Screening:     3 most recent PHQ Screens 2020   Little interest or pleasure in doing things Not at all   Feeling down, depressed, irritable, or hopeless Not at all   Total Score PHQ 2 0       Alcohol Risk Factor Screening:   Do you average 1 drink per night or more than 7 drinks a week:  No    On any one occasion in the past three months have you have had more than 3 drinks containing alcohol:  No      Functional Ability and Level of Safety:   Hearing: Hearing is good. Activities of Daily Living: The home contains: no safety equipment. Patient does total self care    Ambulation: with no difficulty    Fall Risk:  Fall Risk Assessment, last 12 mths 5/28/2019   Able to walk? Yes   Fall in past 12 months? Yes   Fall with injury? Yes   Number of falls in past 12 months 2   Fall Risk Score 3       Abuse Screen:  Patient is not abused    Cognitive Screening   Has your family/caregiver stated any concerns about your memory: no      Patient Care Team   Patient Care Team:  Gil Johnson MD as PCP - General  Gil Johnson MD as PCP - [facility] Empaneled Provider  Vangie Blandon MD (Ophthalmology)    Assessment/Plan   Education and counseling provided:  Are appropriate based on today's review and evaluation  End-of-Life planning (with patient's consent)  Influenza Vaccine  Screening Mammography  Diabetes screening test    Diagnoses and all orders for this visit:    1. Medicare annual wellness visit, subsequent        Health Maintenance Due   Topic Date Due    GLAUCOMA SCREENING Q2Y  09/25/2019    Medicare Yearly Exam  05/20/2020       Princess Ross is a 66 y.o. female who was evaluated by an audio-video encounter for concerns as above. Patient identification was verified prior to start of the visit. A caregiver was present when appropriate. Due to this being a TeleHealth encounter (During QPVOG-87 public health emergency), evaluation of the following organ systems was limited: Vitals/Constitutional/EENT/Resp/CV/GI//MS/Neuro/Skin/Heme-Lymph-Imm.   Pursuant to the emergency declaration under the [facility] waiver authority and the [email or web address] and [employer] Act, this Virtual Visit was conducted, with patient's (and/or legal guardian's) consent, to reduce the patient's risk of exposure to COVID-19 and provide necessary medical care. Services were provided through a synchronous discussion virtually to substitute for in-person clinic visit. I was in the office. The patient was at home.       Power Aguilar MD

## 2020-05-28 ENCOUNTER — TELEPHONE (OUTPATIENT)
Dept: INTERNAL MEDICINE CLINIC | Age: 75
End: 2020-05-28

## 2020-05-28 ENCOUNTER — VIRTUAL VISIT (OUTPATIENT)
Dept: CARDIOLOGY CLINIC | Age: 75
End: 2020-05-28

## 2020-05-28 VITALS
HEART RATE: 79 BPM | HEIGHT: 64 IN | BODY MASS INDEX: 25.95 KG/M2 | DIASTOLIC BLOOD PRESSURE: 75 MMHG | SYSTOLIC BLOOD PRESSURE: 151 MMHG | WEIGHT: 152 LBS

## 2020-05-28 DIAGNOSIS — R00.2 PALPITATIONS: Primary | ICD-10-CM

## 2020-05-28 DIAGNOSIS — R94.31 ABNORMAL EKG: ICD-10-CM

## 2020-05-28 DIAGNOSIS — I49.3 PVC (PREMATURE VENTRICULAR CONTRACTION): ICD-10-CM

## 2020-05-28 NOTE — PROGRESS NOTES
[facility] Cardiology Telemedicine Encounter                                                         Pursuant to the emergency declaration under the [facility], [facility] waiver authority and the [employer] and [employer] Act, this Virtual  Visit was conducted, with patient's consent, to reduce the patient's risk of exposure to COVID-19 and provide continuity of care for an established patient. Services were provided through a video synchronous discussion virtually to substitute for in-person clinic visit. 1/16 normal stress echo  5/20 holter, rare pacs, occasional pvcs   Subjective/HPI:   Ajit Zelaya is a 66 y.o. female who was seen by synchronous (real-time) audio-video technology on 5/28/2020. For the last month or more Ms. Audrey Vizcaino is been having lots of palpitations. Initially they seem to last off and on for a day or 2 but more recently she has had prolonged episodes where she feels like almost every beat is skipped. When she first call we put a Holter on her which I reviewed and summarized above. It showed no significant abnormalities other than some PVCs and a few PACs. No significant tacky or bradycardia. She has not been exercising as much as she used to because of everything that is going on with the epidemic. She denies any specific cardiac symptoms other than the palpitations. She recently saw Dr. Elfida Mcardle, and because of her increasing symptoms since the time of her Holter he is ordered an event monitor.     PCP Provider  Salazar Solis MD  Past Medical History:   Diagnosis Date    Asthma     Bronchitis     Cerebral meningioma ([facility] [address] resection 1992    Cytomegalovirus ([facility] [address] [facility]     Nohemi-Danlos syndrome     GERD (gastroesophageal reflux disease)     Osteoarthritis     Other ill-defined conditions(799.89) 1992    brain tumor-benign    Pericarditis, acute     Thromboembolus (HCC)     PE x2      Past Surgical History:   Procedure Laterality Date    EXCIS INFRATENT MENINGIOMA      FOREARM/WRIST SURGERY UNLISTED  2010    Right    HX ADENOIDECTOMY      HX CHOLECYSTECTOMY      HX GYN      ectopic pregnancy    HX HEENT  1992    removed brain tumor and plate inserted    HX HERNIA REPAIR      bilateral    HX ORTHOPAEDIC      bilateral knee surgery    HX TONSILLECTOMY      AL COLONOSCOPY FLX DX W/COLLJ SPEC WHEN PFRMD  1/13/2011         TREAT ECTOPIC PREG,ABD PREG       Allergies   Allergen Reactions    Epinephrine Unknown (comments)    Sulfa (Sulfonamide Antibiotics) Other (comments)     Kidneys shut down    Ephedrine Palpitations    Nsaids (Non-Steroidal Anti-Inflammatory Drug) Diarrhea     Indigestion, nausea    Advil [Ibuprofen] Other (comments)     Fluid retention.  Ampicillin Unknown (comments)    Codeine Unknown (comments)    Demerol [Meperidine] Unknown (comments)    Dilaudid [Hydromorphone (Bulk)] Unknown (comments)    Erythromycin Unknown (comments)    Levaquin [Levofloxacin] Other (comments)     GI upset    Lidocaine Shortness of Breath    Ciprofloxacin Nausea and Vomiting      Family History   Problem Relation Age of Onset    Heart Attack Father     Other Father         Gi bleed    Heart Disease Father         [place]  74yo   [facility] Other Mother         Pneumonia    Heart Disease Mother         CHF/pacemaker    Breast Cancer Maternal Aunt 79      Current Outpatient Medications   Medication Sig    DILANTIN EXTENDED 100 mg ER capsule TAKE 3 CAPSULES DAILY    clobetasol (TEMOVATE) 0.05 % topical cream Apply  to affected area two (2) times a day.  loratadine (CLARITIN) 10 mg tablet Take 10 mg by mouth.  acetaminophen (TYLENOL EXTRA STRENGTH) 500 mg tablet Take  by mouth every six (6) hours as needed for Pain.  cholecalciferol (VITAMIN D3) 1,000 unit tablet Take  by mouth daily.  omega-3 fatty acids-vitamin e (FISH OIL) 1,000 mg cap Take 1 Cap by mouth.       Biotin 2,500 mcg cap Take by mouth.  folic acid (FOLVITE) 1 mg tablet Take 0.4 mg by mouth daily. No current facility-administered medications for this visit.        Vitals:    20 1116   BP: 151/75   Pulse: 79   Weight: 152 lb (68.9 kg)   Height: 5' 4\" (1.626 m)     Social History     Socioeconomic History    Marital status:      Spouse name: Not on file    Number of children: Not on file    Years of education: Not on file    Highest education level: Not on file   Occupational History    Not on file   Social Needs    Financial resource strain: Not on file    Food insecurity     Worry: Not on file     Inability: Not on file    Transportation needs     Medical: Not on file     Non-medical: Not on file   Tobacco Use    Smoking status: Former Smoker     Packs/day: 1.00     Years: 10.00     Pack years: 10.00     Types: Cigarettes     Last attempt to quit: 1973     Years since quittin.4    Smokeless tobacco: Never Used    Tobacco comment:    Substance and Sexual Activity    Alcohol use: No     Alcohol/week: 0.0 standard drinks    Drug use: No    Sexual activity: Not Currently   Lifestyle    Physical activity     Days per week: Not on file     Minutes per session: Not on file    Stress: Not on file   Relationships    Social connections     Talks on phone: Not on file     Gets together: Not on file     Attends Jain service: Not on file     Active member of club or organization: Not on file     Attends meetings of clubs or organizations: Not on file     Relationship status: Not on file    Intimate partner violence     Fear of current or ex partner: Not on file     Emotionally abused: Not on file     Physically abused: Not on file     Forced sexual activity: Not on file   Other Topics Concern    Not on file   Social History Narrative    Not on file       Review of Symptoms  11 systems reviewed, negative other than as stated in the HPI    Physical Exam:    Due to this being a TeleHealth evaluation, many elements of the physical examination are unable to be assessed. General: Well developed, in no acute distress, cooperative and alert  HEENT: Pupils equal/round. No marked JVD visible on video. Respiratory: No audible wheezing, no signs of respiratory distress, lips non cyanotic  Extremities:  No edema  Neuro: A&Ox3, speech clear, no facial droop, answering questions appropriately  Skin: Skin color is normal. No rashes or lesions. Non diaphoretic on visible skin during exam      Cardiology Labs:  Lab Results   Component Value Date/Time    Cholesterol, total 201 (H) 05/22/2019 10:42 AM    HDL Cholesterol 86 05/22/2019 10:42 AM    LDL, calculated 100 (H) 05/22/2019 10:42 AM    Triglyceride 73 05/22/2019 10:42 AM    CHOL/HDL Ratio 2.8 11/23/2009 10:44 AM       Lab Results   Component Value Date/Time    Sodium 143 05/22/2019 10:42 AM    Potassium 4.5 05/22/2019 10:42 AM    Chloride 105 05/22/2019 10:42 AM    CO2 26 05/22/2019 10:42 AM    Anion gap 8 01/02/2016 04:17 PM    Glucose 90 05/22/2019 10:42 AM    BUN 10 05/22/2019 10:42 AM    Creatinine 0.51 (L) 05/22/2019 10:42 AM    BUN/Creatinine ratio 20 05/22/2019 10:42 AM    GFR est  05/22/2019 10:42 AM    GFR est non-AA 94 05/22/2019 10:42 AM    Calcium 9.6 05/22/2019 10:42 AM    Bilirubin, total 0.2 05/22/2019 10:42 AM    Alk. phosphatase 160 (H) 05/22/2019 10:42 AM    Protein, total 6.6 05/22/2019 10:42 AM    Albumin 4.2 05/22/2019 10:42 AM    Globulin 3.5 01/02/2016 04:17 PM    A-G Ratio 1.8 05/22/2019 10:42 AM    ALT (SGPT) 14 05/22/2019 10:42 AM         Assessment:     Diagnoses and all orders for this visit:    1. Palpitations    2. Abnormal EKG    3. PVC (premature ventricular contraction)        ICD-10-CM ICD-9-CM    1. Palpitations R00.2 785.1    2. Abnormal EKG R94.31 794.31    3. PVC (premature ventricular contraction) I49.3 427.69      No orders of the defined types were placed in this encounter. Plan:     I think Ms. Alexandra Sanchez has benign palpitations, PVCs and PACs. We talked about potential causes for this, and the only thing she is can say in that regard that she does notice that when she gets stressed, anxious or upset they will occur more often. Because of that I explained to her the rationale behind the use of beta-blockers which may or may not be effective, and she does not want to go there right now. Apparently her father-in-law was on a beta-blocker and eventually needed a pacemaker. I do think we should get it resting echocardiogram on her to make sure she is not developed any structural issues since her stress test in 2016. current treatment plan is effective, no change in therapy  lab results and schedule of future lab studies reviewed with patient  reviewed diet, exercise and weight control  We discussed the expected course, resolution and complications of the diagnosis(es) in detail. Medication risks, benefits, costs, interactions, and alternatives were discussed as indicated. I advised her to contact the office if her condition worsens, changes or fails to improve as anticipated. She expressed understanding with the diagnosis(es) and plan    Rosalio Westbrook MD    Greater than 20 minutes was spent in direct video patient care, planning and chart review. This visit was conducted using [employer] telemedicine services.

## 2020-06-02 ENCOUNTER — TELEPHONE (OUTPATIENT)
Dept: CARDIOLOGY CLINIC | Age: 75
End: 2020-06-02

## 2020-06-02 ENCOUNTER — HOSPITAL ENCOUNTER (OUTPATIENT)
Dept: NON INVASIVE DIAGNOSTICS | Age: 75
Discharge: HOME | End: 2020-06-02
Attending: INTERNAL MEDICINE
Payer: MEDICARE

## 2020-06-02 DIAGNOSIS — R00.2 PALPITATIONS: ICD-10-CM

## 2020-06-02 PROCEDURE — 93271 ECG/MONITORING AND ANALYSIS: CPT

## 2020-06-02 NOTE — TELEPHONE ENCOUNTER
Called patient. LM on  stating calling with results. I also said I was sending [employer] message. Sent [employer].

## 2020-06-02 NOTE — TELEPHONE ENCOUNTER
----- Message from Oziel Miguel MD sent at 6/2/2020  2:06 PM EDT -----  Heart muscle is normal, strong. Mild abnormality of the mitral valve, but valve is functioning normally and not a reason for her palpitations. Looks great.

## 2020-06-03 ENCOUNTER — HOSPITAL ENCOUNTER (OUTPATIENT)
Dept: LAB | Age: 75
Discharge: HOME | End: 2020-06-03
Payer: MEDICARE

## 2020-06-03 ENCOUNTER — OFFICE VISIT (OUTPATIENT)
Dept: INTERNAL MEDICINE CLINIC | Age: 75
End: 2020-06-03

## 2020-06-03 VITALS — TEMPERATURE: 96 F

## 2020-06-03 DIAGNOSIS — G40.909 SEIZURE DISORDER (HCC): Primary | ICD-10-CM

## 2020-06-03 PROCEDURE — 80185 ASSAY OF PHENYTOIN TOTAL: CPT

## 2020-06-03 PROCEDURE — 80061 LIPID PANEL: CPT

## 2020-06-03 PROCEDURE — 36415 COLL VENOUS BLD VENIPUNCTURE: CPT

## 2020-06-03 PROCEDURE — 85651 RBC SED RATE NONAUTOMATED: CPT

## 2020-06-03 PROCEDURE — 80053 COMPREHEN METABOLIC PANEL: CPT

## 2020-06-03 PROCEDURE — 85025 COMPLETE CBC W/AUTO DIFF WBC: CPT

## 2020-06-03 PROCEDURE — 86141 C-REACTIVE PROTEIN HS: CPT

## 2020-06-03 PROCEDURE — 84443 ASSAY THYROID STIM HORMONE: CPT

## 2020-06-04 LAB
ALBUMIN SERPL-MCNC: 4.4 G/DL (ref 3.7–4.7)
ALBUMIN/GLOB SERPL: 1.8 {RATIO} (ref 1.2–2.2)
ALP SERPL-CCNC: 146 IU/L (ref 39–117)
ALT SERPL-CCNC: 16 IU/L (ref 0–32)
AST SERPL-CCNC: 18 IU/L (ref 0–40)
BASOPHILS # BLD AUTO: 0 X10E3/UL (ref 0–0.2)
BASOPHILS NFR BLD AUTO: 0 %
BILIRUB SERPL-MCNC: 0.2 MG/DL (ref 0–1.2)
BUN SERPL-MCNC: 8 MG/DL (ref 8–27)
BUN/CREAT SERPL: 14 (ref 12–28)
CALCIUM SERPL-MCNC: 9.9 MG/DL (ref 8.7–10.3)
CHLORIDE SERPL-SCNC: 107 MMOL/L (ref 96–106)
CHOLEST SERPL-MCNC: 199 MG/DL (ref 100–199)
CO2 SERPL-SCNC: 26 MMOL/L (ref 20–29)
CREAT SERPL-MCNC: 0.56 MG/DL (ref 0.57–1)
CRP SERPL HS-MCNC: 7.31 MG/L (ref 0–3)
EOSINOPHIL # BLD AUTO: 0.1 X10E3/UL (ref 0–0.4)
EOSINOPHIL NFR BLD AUTO: 1 %
ERYTHROCYTE [DISTWIDTH] IN BLOOD BY AUTOMATED COUNT: 13 % (ref 11.7–15.4)
ERYTHROCYTE [SEDIMENTATION RATE] IN BLOOD BY WESTERGREN METHOD: 2 MM/HR (ref 0–40)
GLOBULIN SER CALC-MCNC: 2.4 G/DL (ref 1.5–4.5)
GLUCOSE SERPL-MCNC: 92 MG/DL (ref 65–99)
HCT VFR BLD AUTO: 37.9 % (ref 34–46.6)
HDLC SERPL-MCNC: 93 MG/DL
HGB BLD-MCNC: 13 G/DL (ref 11.1–15.9)
IMM GRANULOCYTES # BLD AUTO: 0 X10E3/UL (ref 0–0.1)
IMM GRANULOCYTES NFR BLD AUTO: 0 %
LDLC SERPL CALC-MCNC: 94 MG/DL (ref 0–99)
LYMPHOCYTES # BLD AUTO: 1.6 X10E3/UL (ref 0.7–3.1)
LYMPHOCYTES NFR BLD AUTO: 23 %
MCH RBC QN AUTO: 29.8 PG (ref 26.6–33)
MCHC RBC AUTO-ENTMCNC: 34.3 G/DL (ref 31.5–35.7)
MCV RBC AUTO: 87 FL (ref 79–97)
MONOCYTES # BLD AUTO: 0.6 X10E3/UL (ref 0.1–0.9)
MONOCYTES NFR BLD AUTO: 8 %
NEUTROPHILS # BLD AUTO: 4.8 X10E3/UL (ref 1.4–7)
NEUTROPHILS NFR BLD AUTO: 68 %
PHENYTOIN SERPL-MCNC: 14.9 UG/ML (ref 10–20)
PLATELET # BLD AUTO: 142 X10E3/UL (ref 150–450)
POTASSIUM SERPL-SCNC: 4.9 MMOL/L (ref 3.5–5.2)
PROT SERPL-MCNC: 6.8 G/DL (ref 6–8.5)
RBC # BLD AUTO: 4.36 X10E6/UL (ref 3.77–5.28)
SODIUM SERPL-SCNC: 147 MMOL/L (ref 134–144)
TRIGL SERPL-MCNC: 62 MG/DL (ref 0–149)
TSH SERPL DL<=0.005 MIU/L-ACNC: 3.84 UIU/ML (ref 0.45–4.5)
VLDLC SERPL CALC-MCNC: 12 MG/DL (ref 5–40)
WBC # BLD AUTO: 7.2 X10E3/UL (ref 3.4–10.8)

## 2020-06-18 ENCOUNTER — DOCUMENTATION ONLY (OUTPATIENT)
Dept: CARDIOLOGY CLINIC | Age: 75
End: 2020-06-18

## 2020-06-18 NOTE — PROGRESS NOTES
Loop monitor at [facility]: (staff asked me to read)  sinus rhythm to sinus bradycardia with occasional premature ventricular and atrial contractions    Cc to Dr Aga Pagan

## 2020-07-01 ENCOUNTER — VIRTUAL VISIT (OUTPATIENT)
Dept: CARDIOLOGY CLINIC | Age: 75
End: 2020-07-01

## 2020-07-01 DIAGNOSIS — R00.2 PALPITATIONS: Primary | ICD-10-CM

## 2020-07-01 DIAGNOSIS — I49.3 PVC (PREMATURE VENTRICULAR CONTRACTION): ICD-10-CM

## 2020-07-01 DIAGNOSIS — R94.31 ABNORMAL EKG: ICD-10-CM

## 2020-07-01 RX ORDER — DICLOFENAC POTASSIUM 50 MG/1
50 TABLET, FILM COATED ORAL 2 TIMES DAILY
COMMUNITY
End: 2021-09-02 | Stop reason: ALTCHOICE

## 2020-07-01 RX ORDER — CHOLECALCIFEROL (VITAMIN D3) 50 MCG
CAPSULE ORAL
COMMUNITY
End: 2021-08-03

## 2020-07-01 NOTE — PROGRESS NOTES
[facility] Cardiology Telemedicine Encounter                                                         Pursuant to the emergency declaration under the [facility], [facility] waiver authority and the [employer] Act, this Virtual  Visit was conducted, with patient's consent, to reduce the patient's risk of exposure to COVID-19 and provide continuity of care for an established patient. Services were provided through a video synchronous discussion virtually to substitute for in-person clinic visit. 1/16 normal stress echo  5/20 holter, rare pacs, occasional pvcs  6/20 event monitor sinus rhythm to sinus bradycardia with occasional premature ventricular and atrial contractions  06/02/20  echo normal lvef, lae, trivial mvp with tr regurg    ·     Current Outpatient Medications on File Prior to Visit   Medication Sig    diclofenac potassium (CATAFLAM) 50 mg tablet Take 50 mg by mouth two (2) times a day. As needed    B.infantis-B.ani-B.long-B.bifi (Probiotic 4X) 10-15 mg TbEC Take  by mouth.  DILANTIN EXTENDED 100 mg ER capsule TAKE 3 CAPSULES DAILY    clobetasol (TEMOVATE) 0.05 % topical cream Apply  to affected area two (2) times a day.  cholecalciferol (VITAMIN D3) 1,000 unit tablet Take  by mouth daily.  omega-3 fatty acids-vitamin e (FISH OIL) 1,000 mg cap Take 1 Cap by mouth.  Biotin 2,500 mcg cap Take  by mouth.  folic acid (FOLVITE) 1 mg tablet Take 0.4 mg by mouth daily. No current facility-administered medications on file prior to visit. Subjective/HPI:   Josh Toribio is a 66 y.o. female who was seen by synchronous (real-time) audio-video technology on 7/1/2020. Her palpitations have almost completely faded away and her event monitor did not show anything but an occasional PVC and PAC.   Interestingly she realized that she had an infected tooth when this was all happening and once she got the root canal done within minutes a day or 2 seem like the palpitations got better. She is exercising regularly on the exercise bike and that feels good other than some problems with her knees. PCP Provider  Errol Ascencio MD  Past Medical History:   Diagnosis Date    Asthma     Bronchitis     Cerebral meningioma ([facility] [address] resection 1992    Cytomegalovirus ([facility] [address] [facility]     Nohemi-Danlos syndrome     GERD (gastroesophageal reflux disease)     Osteoarthritis     Other ill-defined conditions(799.89) 1992    brain tumor-benign    Pericarditis, acute     Thromboembolus (HCC)     PE x2      Past Surgical History:   Procedure Laterality Date    EXCIS INFRATENT MENINGIOMA      FOREARM/WRIST SURGERY UNLISTED  2010    Right    HX ADENOIDECTOMY      HX CHOLECYSTECTOMY      HX GYN      ectopic pregnancy    HX HEENT  1992    removed brain tumor and plate inserted    HX HERNIA REPAIR      bilateral    HX ORTHOPAEDIC      bilateral knee surgery    HX TONSILLECTOMY      MD COLONOSCOPY FLX DX W/COLLJ SPEC WHEN PFRMD  1/13/2011         TREAT ECTOPIC PREG,ABD PREG       Allergies   Allergen Reactions    Epinephrine Unknown (comments)    Sulfa (Sulfonamide Antibiotics) Other (comments)     Kidneys shut down    Ephedrine Palpitations    Nsaids (Non-Steroidal Anti-Inflammatory Drug) Diarrhea     Indigestion, nausea    Advil [Ibuprofen] Other (comments)     Fluid retention.     Ampicillin Unknown (comments)    Codeine Unknown (comments)    Demerol [Meperidine] Unknown (comments)    Dilaudid [Hydromorphone (Bulk)] Unknown (comments)    Erythromycin Unknown (comments)    Levaquin [Levofloxacin] Other (comments)     GI upset    Lidocaine Shortness of Breath    Ciprofloxacin Nausea and Vomiting      Family History   Problem Relation Age of Onset    Heart Attack Father     Other Father         Gi bleed    Heart Disease Father         MI  76yo   Arana Other Mother         Pneumonia    Heart Disease Mother CHF/pacemaker    Breast Cancer Maternal Aunt 70      Current Outpatient Medications   Medication Sig    diclofenac potassium (CATAFLAM) 50 mg tablet Take 50 mg by mouth two (2) times a day. As needed    B.infantis-B.ani-B.long-B.bifi (Probiotic 4X) 10-15 mg TbEC Take  by mouth.  DILANTIN EXTENDED 100 mg ER capsule TAKE 3 CAPSULES DAILY    clobetasol (TEMOVATE) 0.05 % topical cream Apply  to affected area two (2) times a day.  cholecalciferol (VITAMIN D3) 1,000 unit tablet Take  by mouth daily.  omega-3 fatty acids-vitamin e (FISH OIL) 1,000 mg cap Take 1 Cap by mouth.  Biotin 2,500 mcg cap Take  by mouth.  folic acid (FOLVITE) 1 mg tablet Take 0.4 mg by mouth daily. No current facility-administered medications for this visit. There were no vitals filed for this visit.   Social History     Socioeconomic History    Marital status:      Spouse name: Not on file    Number of children: Not on file    Years of education: Not on file    Highest education level: Not on file   Occupational History    Not on file   Social Needs    Financial resource strain: Not on file    Food insecurity     Worry: Not on file     Inability: Not on file    Transportation needs     Medical: Not on file     Non-medical: Not on file   Tobacco Use    Smoking status: Former Smoker     Packs/day: 1.00     Years: 10.00     Pack years: 10.00     Types: Cigarettes     Last attempt to quit: 1973     Years since quittin.4    Smokeless tobacco: Never Used    Tobacco comment:    Substance and Sexual Activity    Alcohol use: No     Alcohol/week: 0.0 standard drinks    Drug use: No    Sexual activity: Not Currently   Lifestyle    Physical activity     Days per week: Not on file     Minutes per session: Not on file    Stress: Not on file   Relationships    Social connections     Talks on phone: Not on file     Gets together: Not on file     Attends Yarsanism service: Not on file Active member of club or organization: Not on file     Attends meetings of clubs or organizations: Not on file     Relationship status: Not on file    Intimate partner violence     Fear of current or ex partner: Not on file     Emotionally abused: Not on file     Physically abused: Not on file     Forced sexual activity: Not on file   Other Topics Concern    Not on file   Social History Narrative    Not on file       Review of Symptoms  11 systems reviewed, negative other than as stated in the HPI    Physical Exam:    Due to this being a TeleHealth evaluation, many elements of the physical examination are unable to be assessed. General: Well developed, in no acute distress, cooperative and alert  HEENT: Pupils equal/round. No marked JVD visible on video. Respiratory: No audible wheezing, no signs of respiratory distress, lips non cyanotic  Extremities:  No edema  Neuro: A&Ox3, speech clear, no facial droop, answering questions appropriately  Skin: Skin color is normal. No rashes or lesions. Non diaphoretic on visible skin during exam      Cardiology Labs:  Lab Results   Component Value Date/Time    Cholesterol, total 199 06/03/2020 09:21 AM    HDL Cholesterol 93 06/03/2020 09:21 AM    LDL, calculated 94 06/03/2020 09:21 AM    Triglyceride 62 06/03/2020 09:21 AM    CHOL/HDL Ratio 2.8 11/23/2009 10:44 AM       Lab Results   Component Value Date/Time    Sodium 147 (H) 06/03/2020 09:21 AM    Potassium 4.9 06/03/2020 09:21 AM    Chloride 107 (H) 06/03/2020 09:21 AM    CO2 26 06/03/2020 09:21 AM    Anion gap 8 01/02/2016 04:17 PM    Glucose 92 06/03/2020 09:21 AM    BUN 8 06/03/2020 09:21 AM    Creatinine 0.56 (L) 06/03/2020 09:21 AM    BUN/Creatinine ratio 14 06/03/2020 09:21 AM    GFR est  06/03/2020 09:21 AM    GFR est non-AA 91 06/03/2020 09:21 AM    Calcium 9.9 06/03/2020 09:21 AM    Bilirubin, total 0.2 06/03/2020 09:21 AM    Alk.  phosphatase 146 (H) 06/03/2020 09:21 AM    Protein, total 6.8 06/03/2020 09:21 AM    Albumin 4.4 06/03/2020 09:21 AM    Globulin 3.5 01/02/2016 04:17 PM    A-G Ratio 1.8 06/03/2020 09:21 AM    ALT (SGPT) 16 06/03/2020 09:21 AM         Assessment:     Diagnoses and all orders for this visit:    1. Palpitations    2. PVC (premature ventricular contraction)    3. Abnormal EKG        ICD-10-CM ICD-9-CM    1. Palpitations R00.2 785.1    2. PVC (premature ventricular contraction) I49.3 427.69    3. Abnormal EKG R94.31 794.31      Orders Placed This Encounter    diclofenac potassium (CATAFLAM) 50 mg tablet     Sig: Take 50 mg by mouth two (2) times a day. As needed    B.infantis-B.ani-B.long-B.bifi (Probiotic 4X) 10-15 mg TbEC     Sig: Take  by mouth. Plan:   Overall Ms. Brittnee Campoverde is stable and minimally symptomatic with her PVCs. I again told her that any point if she wanted to try a beta-blocker we could certainly do that but at this time she needs no further cardiac testing. She wants to be seen as needed          current treatment plan is effective, no change in therapy  lab results and schedule of future lab studies reviewed with patient  reviewed diet, exercise and weight control  We discussed the expected course, resolution and complications of the diagnosis(es) in detail. Medication risks, benefits, costs, interactions, and alternatives were discussed as indicated. I advised her to contact the office if her condition worsens, changes or fails to improve as anticipated. She expressed understanding with the diagnosis(es) and plan    [address] [place] [address] [address], MD    Greater than 20 minutes was spent in direct video patient care, planning and chart review. This visit was conducted using [employer] services.

## 2021-03-15 DIAGNOSIS — G40.909 SEIZURE DISORDER (HCC): ICD-10-CM

## 2021-03-15 RX ORDER — PHENYTOIN SODIUM 100 MG/1
CAPSULE, EXTENDED RELEASE ORAL
Qty: 270 CAP | Refills: 3 | Status: SHIPPED | OUTPATIENT
Start: 2021-03-15 | End: 2022-02-08 | Stop reason: SDUPTHER

## 2021-05-20 ENCOUNTER — OFFICE VISIT (OUTPATIENT)
Dept: INTERNAL MEDICINE CLINIC | Age: 76
End: 2021-05-20
Payer: MEDICARE

## 2021-05-20 VITALS
SYSTOLIC BLOOD PRESSURE: 132 MMHG | TEMPERATURE: 97.6 F | OXYGEN SATURATION: 98 % | DIASTOLIC BLOOD PRESSURE: 72 MMHG | WEIGHT: 152 LBS | BODY MASS INDEX: 25.95 KG/M2 | HEART RATE: 92 BPM | HEIGHT: 64 IN | RESPIRATION RATE: 18 BRPM

## 2021-05-20 DIAGNOSIS — N39.0 RECURRENT UTI: ICD-10-CM

## 2021-05-20 DIAGNOSIS — D32.9 MENINGIOMA (HCC): ICD-10-CM

## 2021-05-20 DIAGNOSIS — G40.909 SEIZURE DISORDER (HCC): ICD-10-CM

## 2021-05-20 DIAGNOSIS — Z00.00 MEDICARE ANNUAL WELLNESS VISIT, SUBSEQUENT: Primary | ICD-10-CM

## 2021-05-20 DIAGNOSIS — Z12.31 SCREENING MAMMOGRAM, ENCOUNTER FOR: ICD-10-CM

## 2021-05-20 DIAGNOSIS — I49.1 PREMATURE ATRIAL BEAT: ICD-10-CM

## 2021-05-20 DIAGNOSIS — R52 PAIN, UNSPECIFIED: ICD-10-CM

## 2021-05-20 DIAGNOSIS — R07.9 CHEST PAIN, UNSPECIFIED TYPE: ICD-10-CM

## 2021-05-20 DIAGNOSIS — R41.3 MEMORY LOSS: ICD-10-CM

## 2021-05-20 PROCEDURE — 1090F PRES/ABSN URINE INCON ASSESS: CPT | Performed by: INTERNAL MEDICINE

## 2021-05-20 PROCEDURE — G8399 PT W/DXA RESULTS DOCUMENT: HCPCS | Performed by: INTERNAL MEDICINE

## 2021-05-20 PROCEDURE — G8510 SCR DEP NEG, NO PLAN REQD: HCPCS | Performed by: INTERNAL MEDICINE

## 2021-05-20 PROCEDURE — G8419 CALC BMI OUT NRM PARAM NOF/U: HCPCS | Performed by: INTERNAL MEDICINE

## 2021-05-20 PROCEDURE — 99214 OFFICE O/P EST MOD 30 MIN: CPT | Performed by: INTERNAL MEDICINE

## 2021-05-20 PROCEDURE — G8427 DOCREV CUR MEDS BY ELIG CLIN: HCPCS | Performed by: INTERNAL MEDICINE

## 2021-05-20 PROCEDURE — G0439 PPPS, SUBSEQ VISIT: HCPCS | Performed by: INTERNAL MEDICINE

## 2021-05-20 PROCEDURE — G0463 HOSPITAL OUTPT CLINIC VISIT: HCPCS | Performed by: INTERNAL MEDICINE

## 2021-05-20 PROCEDURE — 1101F PT FALLS ASSESS-DOCD LE1/YR: CPT | Performed by: INTERNAL MEDICINE

## 2021-05-20 PROCEDURE — G8536 NO DOC ELDER MAL SCRN: HCPCS | Performed by: INTERNAL MEDICINE

## 2021-05-20 NOTE — PATIENT INSTRUCTIONS

## 2021-05-20 NOTE — PROGRESS NOTES
This is the Subsequent Medicare Annual Wellness Exam, performed 12 months or more after the Initial AWV or the last Subsequent AWV    I have reviewed the patient's medical history in detail and updated the computerized patient record. Also for follow-up of her health issues. Overall she has been doing pretty well. She did have an episode of chest discomfort associated with eating several months ago. She was seen in the emergency room and had a negative cardiac work-up. She was told to follow-up with cardiology for her PACs. She had an echocardiogram and an event monitor done a year ago. She does note some increasing difficulty with her memory and specifically word finding. She has had a history of meningioma resection years ago. She has occasional headaches. No dizziness. Some visual change associated with cataracts and left-sided ptosis on the side she had her previous brain surgery. A complete review of systems is otherwise negative.     Physical Examination: General appearance - alert, well appearing, and in no distress  Ears - bilateral TM's and external ear canals normal  Mouth - mucous membranes moist, pharynx normal without lesions  Neck - supple, no significant adenopathy  Lymphatics - no palpable lymphadenopathy, no hepatosplenomegaly  Chest - clear to auscultation, no wheezes, rales or rhonchi, symmetric air entry  Heart - normal rate and regular rhythm  Abdomen - soft, nontender, nondistended, no masses or organomegaly  Back exam - full range of motion, no tenderness, palpable spasm or pain on motion  Neurological - alert, oriented, normal speech, no focal findings or movement disorder noted  Musculoskeletal - no joint tenderness, deformity or swelling  Extremities - peripheral pulses normal, no pedal edema, no clubbing or cyanosis        Assessment/Plan   Education and counseling provided:  Are appropriate based on today's review and evaluation  Influenza Vaccine  Screening Mammography  Diabetes screening test    1. Medicare annual wellness visit, subsequent  2. Seizure disorder (HCC)-appears stable. Check Dilantin level. No recurrent seizures. -     METABOLIC PANEL, COMPREHENSIVE; Future  -     CBC WITH AUTOMATED DIFF; Future  -     TSH 3RD GENERATION; Future  -     PHENYTOIN; Future  -     CT HEAD W WO CONT; Future  3. Chest pain, unspecified type-likely esophageal in nature. The next time it happens she will try sipping on liquids and see if it will resolve. If recurrent, consider upper endoscopy. -     TSH 3RD GENERATION; Future  4. Recurrent UTI-repeat urinalysis today. -     METABOLIC PANEL, COMPREHENSIVE; Future  -     CBC WITH AUTOMATED DIFF; Future  -     TSH 3RD GENERATION; Future  -     URINALYSIS W/MICROSCOPIC; Future  5. Screening mammogram, encounter for  -     [facility] MAMMO BI SCREENING INCL CAD; Future  6. Premature atrial beat-stable. -     TSH 3RD GENERATION; Future  7. Pain, unspecified   -     TSH 3RD GENERATION; Future  8. Memory loss-check thyroid and B12 levels. We will repeat CT scan to be sure there is no evidence of recurrence. Apparently the neurosurgeon told her in the past that the type of meningioma she had put her at high risk for recurrence. -     VITAMIN B12; Future  9. Meningioma (HCC)  -     CT HEAD W WO CONT; Future       Depression Risk Factor Screening     3 most recent PHQ Screens 5/20/2021   Little interest or pleasure in doing things Not at all   Feeling down, depressed, irritable, or hopeless Not at all   Total Score PHQ 2 0       Alcohol Risk Screen    Do you average more than 1 drink per night or more than 7 drinks a week:  No    On any one occasion in the past three months have you have had more than 3 drinks containing alcohol:  No        Functional Ability and Level of Safety    Hearing: Hearing is good. Activities of Daily Living: The home contains: no safety equipment.   Patient does total self care      Ambulation: with no difficulty     Fall Risk:  Fall Risk Assessment, last 12 mths 5/20/2021   Able to walk? Yes   Fall in past 12 months? 1   Do you feel unsteady? 0   Are you worried about falling 0   Is TUG test greater than 12 seconds? 0   Is the gait abnormal? 0   Number of falls in past 12 months 1   Fall with injury?  1      Abuse Screen:  Patient is not abused       Cognitive Screening    Has your family/caregiver stated any concerns about your memory: no         Health Maintenance Due     Health Maintenance Due   Topic Date Due    Hepatitis C Screening  Never done       Patient Care Team   Patient Care Team:  Abdoulaye Scott MD as PCP - General  Abdoulaye Scott MD as PCP [facility] Provider  Mary Ann Hu MD (Ophthalmology)    History     Patient Active Problem List   Diagnosis Code    Seizure [facility]) R56.9    Seizure disorder ([address] G40.909    Recurrent UTI N39.0    Chest pain R07.9    Abnormal EKG R94.31     Past Medical History:   Diagnosis Date    Arrhythmia 1990's    more frequently this year    Asthma     Bronchitis     Cerebral meningioma ([address] resection 1992    Chronic pain 2000's    Joint pain    Cytomegalovirus ([address]     Nohemi-Danlos syndrome     GERD (gastroesophageal reflux disease)     Osteoarthritis     Other ill-defined conditions(799.89) 1992    brain tumor-benign    Pericarditis, acute     Thromboembolus ([address]     PE x2      Past Surgical History:   Procedure Laterality Date    HX ADENOIDECTOMY      HX CHOLECYSTECTOMY      HX ENDOSCOPY  1996    HX GYN      ectopic pregnancy    HX HEENT  1992    removed brain tumor and plate inserted    HX HERNIA REPAIR      bilateral    HX ORTHOPAEDIC      bilateral knee surgery    HX TONSILLECTOMY      DC COLONOSCOPY FLX DX W/COLLJ SPEC WHEN PFRMD  1/13/2011         DC EXCIS INFRATENT MENINGIOMA      DC FOREARM/WRIST SURGERY UNLISTED  2010    Right    DC TREAT ECTOPIC PREG,ABD PREG       Current Outpatient Medications   Medication Sig Dispense Refill    Dilantin Extended 100 mg ER capsule TAKE 3 CAPSULES DAILY 270 Cap 3    diclofenac potassium (CATAFLAM) 50 mg tablet Take 50 mg by mouth two (2) times a day. As needed      B.infantis-B.ani-B.long-B.bifi (Probiotic 4X) 10-15 mg TbEC Take  by mouth.  cholecalciferol (VITAMIN D3) 1,000 unit tablet Take  by mouth daily.  omega-3 fatty acids-vitamin e (FISH OIL) 1,000 mg cap Take 1 Cap by mouth.  Biotin 2,500 mcg cap Take  by mouth.  folic acid (FOLVITE) 1 mg tablet Take 0.4 mg by mouth daily.  clobetasol (TEMOVATE) 0.05 % topical cream Apply  to affected area two (2) times a day. 15 g 0     Allergies   Allergen Reactions    Epinephrine Unknown (comments)    Sulfa (Sulfonamide Antibiotics) Other (comments)     Kidneys shut down    Ephedrine Palpitations    Nsaids (Non-Steroidal Anti-Inflammatory Drug) Diarrhea     Indigestion, nausea    Advil [Ibuprofen] Other (comments)     Fluid retention.     Ampicillin Unknown (comments)    Codeine Unknown (comments)    Demerol [Meperidine] Unknown (comments)    Dilaudid [Hydromorphone (Bulk)] Unknown (comments)    Erythromycin Unknown (comments)    Levaquin [Levofloxacin] Other (comments)     GI upset    Lidocaine Shortness of Breath    Ciprofloxacin Nausea and Vomiting       Family History   Problem Relation Age of Onset    Heart Attack Father     Other Father         Gi bleed    Heart Disease Father         MI  74yo   Pedro Luis [place] Alcohol abuse Father     Other Mother         Pneumonia    Heart Disease Mother         CHF/pacemaker    Arthritis-osteo Mother     Asthma Mother    Jocelyne Chalet Bladder Disease Mother     Hypertension Mother     Breast Cancer Maternal Aunt 79     Social History     Tobacco Use    Smoking status: Former Smoker     Packs/day: 1.00     Years: 10.00     Pack years: 10.00     Types: Cigarettes     Quit date: 1973     Years since quittin.3    Smokeless tobacco: Never Used    Tobacco comment: Stopped in 1974   Substance Use Topics    Alcohol use: Not Currently     Alcohol/week: 0.0 standard drinks         Janae Rodriguez MD

## 2021-05-25 ENCOUNTER — TELEPHONE (OUTPATIENT)
Dept: INTERNAL MEDICINE CLINIC | Age: 76
End: 2021-05-25

## 2021-05-25 DIAGNOSIS — E53.8 B12 DEFICIENCY: Primary | ICD-10-CM

## 2021-05-26 RX ORDER — CYANOCOBALAMIN 1000 UG/ML
1000 INJECTION, SOLUTION INTRAMUSCULAR; SUBCUTANEOUS
Qty: 4 VIAL | Refills: 0
Start: 2021-05-26 | End: 2021-06-03 | Stop reason: SDUPTHER

## 2021-05-26 NOTE — TELEPHONE ENCOUNTER
Pt returned my call. 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 Request rx to go to [facility].

## 2021-05-26 NOTE — TELEPHONE ENCOUNTER
Orders Placed This Encounter    cyanocobalamin (VITAMIN B12) 1,000 mcg/mL injection     Si mL by IntraMUSCular route every seven (7) days. Administer 1 mL IM or SC every 7 days x 4 then switch to OTC B12 5,000 mcg once daily. Dx:E53.8     Dispense:  4 Vial     Refill:  0     The above orders were approved via [facility] per Dr. Adeel Castaenda, III.

## 2021-06-02 ENCOUNTER — TRANSCRIBE ORDER (OUTPATIENT)
Dept: GENERAL RADIOLOGY | Age: 76
End: 2021-06-02

## 2021-06-02 ENCOUNTER — HOSPITAL ENCOUNTER (OUTPATIENT)
Dept: MAMMOGRAPHY | Age: 76
Discharge: HOME | End: 2021-06-02
Attending: INTERNAL MEDICINE
Payer: MEDICARE

## 2021-06-02 ENCOUNTER — HOSPITAL ENCOUNTER (OUTPATIENT)
Dept: CT IMAGING | Age: 76
Discharge: HOME | End: 2021-06-02
Attending: INTERNAL MEDICINE
Payer: MEDICARE

## 2021-06-02 DIAGNOSIS — Z12.31 SCREENING MAMMOGRAM, ENCOUNTER FOR: Primary | ICD-10-CM

## 2021-06-02 DIAGNOSIS — Z12.31 SCREENING MAMMOGRAM, ENCOUNTER FOR: ICD-10-CM

## 2021-06-02 DIAGNOSIS — D32.9 MENINGIOMA (HCC): ICD-10-CM

## 2021-06-02 DIAGNOSIS — G40.909 SEIZURE DISORDER (HCC): ICD-10-CM

## 2021-06-02 PROCEDURE — 77063 BREAST TOMOSYNTHESIS BI: CPT

## 2021-06-02 PROCEDURE — 74011000636 HC RX REV CODE- 636: Performed by: RADIOLOGY

## 2021-06-02 PROCEDURE — 70470 CT HEAD/BRAIN W/O & W/DYE: CPT

## 2021-06-02 RX ADMIN — IOPAMIDOL 100 ML: 612 INJECTION, SOLUTION INTRAVENOUS at 14:41

## 2021-06-03 ENCOUNTER — TELEPHONE (OUTPATIENT)
Dept: INTERNAL MEDICINE CLINIC | Age: 76
End: 2021-06-03

## 2021-06-03 DIAGNOSIS — E53.8 B12 DEFICIENCY: ICD-10-CM

## 2021-06-03 RX ORDER — CYANOCOBALAMIN 1000 UG/ML
1000 INJECTION, SOLUTION INTRAMUSCULAR; SUBCUTANEOUS
Qty: 4 VIAL | Refills: 0 | Status: SHIPPED | OUTPATIENT
Start: 2021-06-03 | End: 2021-10-19

## 2021-06-03 NOTE — TELEPHONE ENCOUNTER
I accidentally ordered it as no print last week. I have resent it to [employer] for pt. Orders Placed This Encounter    cyanocobalamin (VITAMIN B12) 1,000 mcg/mL injection     Si mL by IntraMUSCular route every seven (7) days. Administer 1 mL IM or SC every 7 days x 4 then switch to OTC B12 5,000 mcg once daily. Dx:E53.8     Dispense:  4 Vial     Refill:  0     The above orders were approved via [facility] per Dr. Dot Oro, III.

## 2021-06-03 NOTE — TELEPHONE ENCOUNTER
PT says she was supposed to have b12 called into her pharmacy.  I don't see it on her med list.    [id number] at [address] [place] [address]  [phone]

## 2021-08-03 ENCOUNTER — OFFICE VISIT (OUTPATIENT)
Dept: OBGYN CLINIC | Age: 76
End: 2021-08-03
Payer: MEDICARE

## 2021-08-03 VITALS — WEIGHT: 155 LBS | BODY MASS INDEX: 26.61 KG/M2

## 2021-08-03 DIAGNOSIS — N90.89 VULVAR LESION: ICD-10-CM

## 2021-08-03 DIAGNOSIS — Z01.411 ENCOUNTER FOR GYNECOLOGICAL EXAMINATION WITH ABNORMAL FINDING: Primary | ICD-10-CM

## 2021-08-03 PROCEDURE — G0101 CA SCREEN;PELVIC/BREAST EXAM: HCPCS | Performed by: OBSTETRICS & GYNECOLOGY

## 2021-08-03 PROCEDURE — G8419 CALC BMI OUT NRM PARAM NOF/U: HCPCS | Performed by: OBSTETRICS & GYNECOLOGY

## 2021-08-03 PROCEDURE — 1101F PT FALLS ASSESS-DOCD LE1/YR: CPT | Performed by: OBSTETRICS & GYNECOLOGY

## 2021-08-03 PROCEDURE — 1090F PRES/ABSN URINE INCON ASSESS: CPT | Performed by: OBSTETRICS & GYNECOLOGY

## 2021-08-03 PROCEDURE — G8432 DEP SCR NOT DOC, RNG: HCPCS | Performed by: OBSTETRICS & GYNECOLOGY

## 2021-08-03 NOTE — PROGRESS NOTES
Annual exam    Aric Otto is a 64 y.o. presenting for annual exam.   Her main concerns today include a spot on her vulva. Ob/Gyn Hx:  No obstetric history on file. Health maintenance:  Pap-years ago.   Mammo-06/2021- normal  Colonoscopy- UTD    Past Medical History:   Diagnosis Date    Arrhythmia 1990's    more frequently this year    Asthma     Bronchitis     Cerebral meningioma ([address] resection 1992    Chronic pain 2000's    Joint pain    Cytomegalovirus (HCC)     Nohemi-Danlos syndrome     GERD (gastroesophageal reflux disease)     Osteoarthritis     Other ill-defined conditions(799.89) 1992    brain tumor-benign    Pericarditis, acute     Thromboembolus (HCC)     PE x2       Past Surgical History:   Procedure Laterality Date    HX ADENOIDECTOMY      HX CHOLECYSTECTOMY      HX ENDOSCOPY  1996    HX GYN      ectopic pregnancy    HX HEENT  1992    removed brain tumor and plate inserted    HX HERNIA REPAIR      bilateral    HX ORTHOPAEDIC      bilateral knee surgery    HX TONSILLECTOMY      [place] COLONOSCOPY FLX DX W/COLLJ SPEC WHEN PFRMD  1/13/2011         [place] EXCIS INFRATENT MENINGIOMA      [place] FOREARM/WRIST SURGERY UNLISTED  2010    Right    [place] TREAT ECTOPIC PREG,ABD PREG         Family History   Problem Relation Age of Onset    Heart Attack Father     Other Father         Gi bleed    Heart Disease Father         MI  74yo   [employer] Alcohol abuse Father     Other Mother         Pneumonia    Heart Disease Mother         CHF/pacemaker    Arthritis-osteo Mother     Asthma Mother     Gall Bladder Disease Mother     Hypertension Mother     Breast Cancer Maternal Aunt 79       Social History     Socioeconomic History    Marital status:      Spouse name: Not on file    Number of children: Not on file    Years of education: Not on file    Highest education level: Not on file   Occupational History    Not on file   Tobacco Use    Smoking status: Former Smoker     Packs/day: 1.00 Years: 10.00     Pack years: 10.00     Types: Cigarettes     Quit date: 1973     Years since quittin.5    Smokeless tobacco: Never Used    Tobacco comment: Stopped in    Substance and Sexual Activity    Alcohol use: Not Currently     Alcohol/week: 0.0 standard drinks    Drug use: Never    Sexual activity: Yes     Partners: Male     Birth control/protection: None   Other Topics Concern    Not on file   Social History Narrative    Not on file     Social Determinants of Health     Financial Resource Strain:     Difficulty of Paying Living Expenses:    Food Insecurity:     Worried About Running Out of Food in the Last Year:     [address] in the Last Year:    Transportation Needs:     Lack of Transportation (Medical):  Lack of Transportation (Non-Medical):    Physical Activity:     Days of Exercise per Week:     Minutes of Exercise per Session:    Stress:     Feeling of Stress :    Social Connections:     Frequency of Communication with Friends and Family:     Frequency of Social Gatherings with Friends and Family:     Attends [employer] Services:     Active Member of Clubs or Organizations:     Attends Club or Organization Meetings:     Marital Status:    Intimate Partner Violence:     Fear of Current or Ex-Partner:     Emotionally Abused:     Physically Abused:     Sexually Abused:        Current Outpatient Medications   Medication Sig Dispense Refill    cyanocobalamin (VITAMIN B12) 1,000 mcg/mL injection 1 mL by IntraMUSCular route every seven (7) days. Administer 1 mL IM or SC every 7 days x 4 then switch to OTC B12 5,000 mcg once daily. Dx:E53.8 4 Vial 0    Dilantin Extended 100 mg ER capsule TAKE 3 CAPSULES DAILY 270 Cap 3    diclofenac potassium (CATAFLAM) 50 mg tablet Take 50 mg by mouth two (2) times a day. As needed      B.infantis-B.ani-B.long-B.bifi (Probiotic 4X) 10-15 mg TbEC Take  by mouth.       clobetasol (TEMOVATE) 0.05 % topical cream Apply  to affected area two (2) times a day. 15 g 0    cholecalciferol (VITAMIN D3) 1,000 unit tablet Take  by mouth daily.  omega-3 fatty acids-vitamin e (FISH OIL) 1,000 mg cap Take 1 Cap by mouth.  Biotin 2,500 mcg cap Take  by mouth.  folic acid (FOLVITE) 1 mg tablet Take 0.4 mg by mouth daily. Allergies   Allergen Reactions    Epinephrine Unknown (comments)    Sulfa (Sulfonamide Antibiotics) Other (comments)     Kidneys shut down    Ephedrine Palpitations    Nsaids (Non-Steroidal Anti-Inflammatory Drug) Diarrhea     Indigestion, nausea    Advil [Ibuprofen] Other (comments)     Fluid retention.  Ampicillin Unknown (comments)    Codeine Unknown (comments)    Demerol [Meperidine] Unknown (comments)    Dilaudid [Hydromorphone (Bulk)] Unknown (comments)    Erythromycin Unknown (comments)    Levaquin [Levofloxacin] Other (comments)     GI upset    Lidocaine Shortness of Breath    Ciprofloxacin Nausea and Vomiting       Physical Exam    There were no vitals taken for this visit.     Constitutional  · Appearance: well-nourished, well developed, alert, in no acute distress    HENT  · Head and Face: appears normal    Neck  · Inspection/Palpation: normal appearance, no masses or tenderness  · Lymph Nodes: no lymphadenopathy present  · Thyroid: gland size normal, nontender, no nodules or masses present on palpation    Chest  · Respiratory Effort: non-labored breathing  · Auscultation: CTAB, normal breath sounds    Cardiovascular  · Heart:  · Auscultation: regular rate and rhythm without murmur  · Extremities: no peripheral edema    Breasts  · Inspection of Breasts: breasts symmetrical, no skin changes, no discharge present, nipple appearance normal, no skin retraction present  · Palpation of Breasts and Axillae: no masses present on palpation, no breast tenderness  · Axillary Lymph Nodes: no lymphadenopathy present    Gastrointestinal  · Abdominal Examination: abdomen non-tender to palpation, normal bowel sounds, no masses present  · Liver and spleen: no hepatomegaly present, spleen not palpable  · Hernias: no hernias identified    Genitourinary  · External Genitalia: normal appearance for age, no discharge present, no tenderness present, no masses present, +atrophy of UG mucosa, raised discolored area on left labia  · Vagina: normal vaginal vault without central or paravaginal defects, no discharge present, no inflammatory lesions present, no masses present  · Bladder: non-tender to palpation  · Urethra: appears normal  · Cervix: normal   · Perineum: perineum within normal limits, no evidence of trauma, no rashes or skin lesions present    Skin  · General Inspection: no rash, no lesions identified    Neurologic/Psychiatric  · Mental Status:  · Orientation: grossly oriented to person, place and time  · Mood and Affect: mood normal, affect appropriate      Assessment/Plan:  64 y.o. postmenopausal female overall doing well.      Health Maintenance:  Given history of precancerous vulvar biopsies will ref to gyn/onc for further workup and mgmt   -refer for mammo  -refer for colonoscopy  -refer for dexa scan    RTC: 1 year for annual wellness assessment, or sooner prn for problems or concerns  -handouts and instructions provided    Itz Castañeda  8/3/2021  12:40 PM     Signed By: Christian Carbone MD     August 3, 2021

## 2021-08-03 NOTE — PATIENT INSTRUCTIONS
Pelvic Exam: Care Instructions  Overview     When your doctor examines your pelvic organs, it's called a pelvic exam. This exam is done to evaluate symptoms, such as pelvic pain or abnormal vaginal bleeding and discharge. It may also be done to collect samples of cells for cervical cancer screening. Before your exam, it's important to share some information with your doctor. You can talk about any concerns you may have. Your doctor will also want to know if you are pregnant or use birth control. And your doctor will want to hear about any problems, surgeries, or procedures you have had in your pelvic area. You will also need to tell your doctor when your last period was. Follow-up care is a key part of your treatment and safety. Be sure to make and go to all appointments, and call your doctor if you are having problems. It's also a good idea to know your test results and keep a list of the medicines you take. How is a pelvic exam done? · During a pelvic exam, you will:  ? Take off your clothes below the waist. You will get a paper or cloth cover to put over the lower half of your body. ? Lie on your back on an exam table with your feet and legs supported by footrests. · The doctor may:  ? Ask you to relax your knees. Your knees need to lean out, toward the walls. ? Check the opening of your vagina for sores or swelling. ? Gently put a tool called a speculum into your vagina. It opens the vagina a little bit. You may feel some pressure. The speculum lets your doctor see inside the vagina. ? Use a small brush, spatula, or swab to get a sample for testing. The doctor then removes the speculum. ? Put on gloves and put one or two fingers of one hand into your vagina. The other hand goes on your lower belly. This lets your doctor feel your pelvic organs. You will probably feel some pressure. ? Put one gloved finger into your rectum and one into your vagina, if needed.  This can also help check your pelvic organs. You may have a small amount of vaginal discharge or bleeding after the exam.  Why is a pelvic exam done? A pelvic exam may be done:  · To collect samples of cells for cervical cancer screening. · To check for vaginal infection. · To check for sexually transmitted infections, such as chlamydia or herpes. · To help find the cause of abnormal uterine bleeding. · To look for problems like uterine fibroids, ovarian cysts, or uterine prolapse. · To help find the cause of pelvic or belly pain. · Before inserting an intrauterine device (IUD). · To collect evidence if you've been sexually assaulted. What are the risks of a pelvic exam?  There is a small chance that the doctor will find something on a pelvic exam that would not have caused a problem. This is called overdiagnosis. It could lead to tests or treatment you don't need. When should you call for help? Watch closely for changes in your health, and be sure to contact your doctor if you have any problems. Where can you learn more? Go to http://www.gray.com/  Enter M421 in the search box to learn more about \"Pelvic Exam: Care Instructions. \"  Current as of: July 17, 2020               Content Version: 12.8  © 1500-7863 [employer]. Care instructions adapted under license by [employer] (which disclaims liability or warranty for this information). If you have questions about a medical condition or this instruction, always ask your healthcare professional. [place] [address] any warranty or liability for your use of this information.

## 2021-08-09 NOTE — PROGRESS NOTES
New Patient, Referred by Dr. Chinmay Morin for vulvar lesion, pt state she does take blood pressure at home and just had it checked at PCP's office     1. Have you been to the ER, urgent care clinic since your last visit? Hospitalized since your last visit?  no    2. Have you seen or consulted any other health care providers outside of the [address] [place] [address] since your last visit? Include any pap smears or colon screening.    no

## 2021-08-10 ENCOUNTER — OFFICE VISIT (OUTPATIENT)
Dept: GYNECOLOGY | Age: 76
End: 2021-08-10
Payer: MEDICARE

## 2021-08-10 VITALS
WEIGHT: 152 LBS | DIASTOLIC BLOOD PRESSURE: 79 MMHG | BODY MASS INDEX: 25.95 KG/M2 | HEIGHT: 64 IN | SYSTOLIC BLOOD PRESSURE: 174 MMHG | HEART RATE: 81 BPM

## 2021-08-10 DIAGNOSIS — N90.89 VULVAR LESION: Primary | ICD-10-CM

## 2021-08-10 PROCEDURE — G0463 HOSPITAL OUTPT CLINIC VISIT: HCPCS | Performed by: OBSTETRICS & GYNECOLOGY

## 2021-08-10 PROCEDURE — 1090F PRES/ABSN URINE INCON ASSESS: CPT | Performed by: OBSTETRICS & GYNECOLOGY

## 2021-08-10 PROCEDURE — G8399 PT W/DXA RESULTS DOCUMENT: HCPCS | Performed by: OBSTETRICS & GYNECOLOGY

## 2021-08-10 PROCEDURE — 99204 OFFICE O/P NEW MOD 45 MIN: CPT | Performed by: OBSTETRICS & GYNECOLOGY

## 2021-08-10 PROCEDURE — 1101F PT FALLS ASSESS-DOCD LE1/YR: CPT | Performed by: OBSTETRICS & GYNECOLOGY

## 2021-08-10 PROCEDURE — G8432 DEP SCR NOT DOC, RNG: HCPCS | Performed by: OBSTETRICS & GYNECOLOGY

## 2021-08-10 PROCEDURE — G8536 NO DOC ELDER MAL SCRN: HCPCS | Performed by: OBSTETRICS & GYNECOLOGY

## 2021-08-10 PROCEDURE — G8427 DOCREV CUR MEDS BY ELIG CLIN: HCPCS | Performed by: OBSTETRICS & GYNECOLOGY

## 2021-08-10 PROCEDURE — G8419 CALC BMI OUT NRM PARAM NOF/U: HCPCS | Performed by: OBSTETRICS & GYNECOLOGY

## 2021-08-10 RX ORDER — UREA 10 %
100 LOTION (ML) TOPICAL DAILY
COMMUNITY
End: 2021-10-19

## 2021-08-10 NOTE — LETTER
8/10/2021    Patient: Usman Garcia   YOB: 1945   Date of Visit: 8/10/2021     Cristhian Patel MD  [address] [facility]  [address]  [facility] [address] [id number]  [address] David Ziegler MD  [address] [facility] [address] [facility] [address]  Via In [facility] [address]    Dear MD Oracio Montes MD,      Thank you for referring Ms. Reynold Colon to [facility] for evaluation. My notes for this consultation are attached. If you have questions, please do not hesitate to call me. I look forward to following your patient along with you.       Sincerely,    Kojo Rodriguez MD

## 2021-08-10 NOTE — PROGRESS NOTES
[address] [facility] [address]  P [phone]  F [phone]    Office Note  Patient ID:  Name:  Penelope Calixto  MRN:  [id number]  :   y.o. Date:  8/10/2021      HISTORY OF PRESENT ILLNESS:  Ms. Penelope Calixto is a 64 y.o.  postmenopausal female who presents as a new patient from Dr. Malathi Calderón for a new vulvar lesion. The patient reports that she has had this lesion since . She has had it examined before, but reports it appears larger now. She reports that she keeps messing with it. Denies bleeding or discharge from the lesion. Reports it is more bothersome now. Denies vaginal bleeding/discharge. Pertinent PMH/PSH: h/o PE (at the time of an ectopic pregnancy in her 25s), cerebral meningioma (s/p resection in ), h/o PVCs? Active, no restrictions. Imaging Review:   n/a    ROS:  A comprehensive review of systems was negative except for that written in the History of Present Illness.  , 10 point ROS    OB/GYN ROS:  Postmenopausal.     ECOG ndGndrndanddndend:nd nd2nd Problem List:  Patient Active Problem List    Diagnosis Date Noted    Vulvar lesion 08/10/2021    Arrhythmia     Thromboembolus ([address]     Pericarditis, acute     GERD (gastroesophageal reflux disease)     Cerebral meningioma ([address]     Chest pain 2016    Abnormal EKG 2016    Recurrent UTI 2014    Seizure disorder ([address]     Seizure ([address] 10/23/2009     PMH:  Past Medical History:   Diagnosis Date    Arrhythmia 's    more frequently this year    Asthma     Bronchitis     Cerebral meningioma ([address] resection     Chronic pain 's    Joint pain    Cytomegalovirus ([address]     Nohemi-Danlos syndrome     GERD (gastroesophageal reflux disease)     Osteoarthritis     Other ill-defined conditions(799.89)     brain tumor-benign    Pericarditis, acute     Thromboembolus ([address]     PE x2      PSH:  Past Surgical History:   Procedure Laterality Date    HX ADENOIDECTOMY      HX CHOLECYSTECTOMY      HX ENDOSCOPY      HX GYN      ectopic pregnancy    HX HEENT      removed brain tumor and plate inserted    HX HERNIA REPAIR      bilateral    HX ORTHOPAEDIC      bilateral knee surgery    HX TONSILLECTOMY      [place] COLONOSCOPY FLX DX W/COLLJ SPEC WHEN PFRMD  2011         [place] EXCIS INFRATENT MENINGIOMA      [place] FOREARM/WRIST SURGERY UNLISTED  2010    Right    [place] TREAT ECTOPIC PREG,ABD PREG        Social History:  Social History     Tobacco Use    Smoking status: Former Smoker     Packs/day: 1.00     Years: 10.00     Pack years: 10.00     Types: Cigarettes     Quit date: 1973     Years since quittin.6    Smokeless tobacco: Never Used    Tobacco comment: Stopped in    Substance Use Topics    Alcohol use: Not Currently     Alcohol/week: 0.0 standard drinks      Family History:  Family History   Problem Relation Age of Onset    Heart Attack Father     Other Father         Gi bleed    Heart Disease Father         MI  76yo   [employer] Alcohol abuse Father     Other Mother         Pneumonia    Heart Disease Mother         CHF/pacemaker    Arthritis-osteo Mother     Asthma Mother    [facility] Bladder Disease Mother     Hypertension Mother     Breast Cancer Maternal Aunt 79      Medications: (reviewed)  Current Outpatient Medications   Medication Sig    cyanocobalamin (Vitamin B-12) 100 mcg tablet Take 100 mcg by mouth daily.  Dilantin Extended 100 mg ER capsule TAKE 3 CAPSULES DAILY    cholecalciferol (VITAMIN D3) 1,000 unit tablet Take  by mouth daily.  omega-3 fatty acids-vitamin e (FISH OIL) 1,000 mg cap Take 1 Cap by mouth.  Biotin 2,500 mcg cap Take  by mouth.  folic acid (FOLVITE) 1 mg tablet Take 0.4 mg by mouth daily.  cyanocobalamin (VITAMIN B12) 1,000 mcg/mL injection 1 mL by IntraMUSCular route every seven (7) days.  Administer 1 mL IM or SC every 7 days x 4 then switch to OTC B12 5,000 mcg once daily. Dx:E53.8 (Patient not taking: Reported on 8/10/2021)    diclofenac potassium (CATAFLAM) 50 mg tablet Take 50 mg by mouth two (2) times a day. As needed (Patient not taking: Reported on 8/10/2021)    clobetasol (TEMOVATE) 0.05 % topical cream Apply  to affected area two (2) times a day. (Patient not taking: Reported on 8/10/2021)     No current facility-administered medications for this visit. Allergies: (reviewed)  Allergies   Allergen Reactions    Epinephrine Unknown (comments)    Sulfa (Sulfonamide Antibiotics) Other (comments)     Kidneys shut down    Ephedrine Palpitations    Nsaids (Non-Steroidal Anti-Inflammatory Drug) Diarrhea     Indigestion, nausea    Advil [Ibuprofen] Other (comments)     Fluid retention.  Ampicillin Unknown (comments)    Codeine Unknown (comments)    Demerol [Meperidine] Unknown (comments)    Dilaudid [Hydromorphone (Bulk)] Unknown (comments)    Erythromycin Unknown (comments)    Levaquin [Levofloxacin] Other (comments)     GI upset    Lidocaine Shortness of Breath    Ciprofloxacin Nausea and Vomiting          OBJECTIVE:    Physical Exam:  VITAL SIGNS: Vitals:    08/10/21 1316   BP: (!) 174/79   Pulse: 81   Weight: 152 lb (68.9 kg)   Height: 5' 4\" (1.626 m)     Body mass index is 26.09 kg/m². GENERAL ERVIN: Conversant, alert, oriented. No acute distress. HEENT: HEENT. No thyroid enlargement. No JVD. Neck: Supple without restrictions. RESPIRATORY: Clear to auscultation and percussion to the bases. No CVAT. CARDIOVASC: RRR without murmur/rub. GASTROINT: soft, non-tender, without masses or organomegaly   MUSCULOSKEL: no joint tenderness, deformity or swelling       EXTREMITIES: extremities normal, atraumatic, no cyanosis or edema   PELVIC: Exam chaperoned by nurse. Normal appearing external genitalia, except for a 1cm raised condylomatous appearing lesion at 5 o'clock. No outright evidence of dysplasia or malignancy.  On speculum exam, normal appearing vagina and cervix. On bimanual exam, the cervix and uterus are normal size and mobile. No evidence of adnexal masses or nodularity. RECTAL: deferred   JODIE SURVEY: No suspicious lymphadenopathy or edema noted. NEURO: Grossly intact. No acute deficit. Lab Date as available:    Lab Results   Component Value Date/Time    WBC 5.5 05/21/2021 11:37 AM    HGB 13.1 05/21/2021 11:37 AM    HCT 40.3 05/21/2021 11:37 AM    PLATELET 820 (L) 02/04/4965 11:37 AM    MCV 93.5 05/21/2021 11:37 AM     Lab Results   Component Value Date/Time    Sodium 139 05/21/2021 11:37 AM    Potassium 4.0 05/21/2021 11:37 AM    Chloride 105 05/21/2021 11:37 AM    CO2 28 05/21/2021 11:37 AM    Anion gap 6 05/21/2021 11:37 AM    Glucose 85 05/21/2021 11:37 AM    BUN 9 05/21/2021 11:37 AM    Creatinine 0.40 (L) 05/21/2021 11:37 AM    BUN/Creatinine ratio 23 (H) 05/21/2021 11:37 AM    GFR est AA >60 05/21/2021 11:37 AM    GFR est non-AA >60 05/21/2021 11:37 AM    Calcium 9.2 05/21/2021 11:37 AM         IMPRESSION/PLAN:    Ms. Kalyn Marley is a 64 y.o. female with a working diagnosis of vulvar lesion, which appears HPV-related and benign. Problems:     Patient Active Problem List    Diagnosis Date Noted    Vulvar lesion 08/10/2021    Arrhythmia     Thromboembolus ([address]     Pericarditis, acute     GERD (gastroesophageal reflux disease)     Cerebral meningioma ([address]     Chest pain 01/22/2016    Abnormal EKG 01/22/2016    Recurrent UTI 04/02/2014    Seizure disorder ([address]     Seizure ([address] 10/23/2009       I reviewed Ms. Husam Shaffer's course to date, including her medical records, recent studies, physical exam, and review of symptoms. 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 I have recommended a pelvic exam under anesthesia with simple vulvectomy. Reviewed risks, benefits, indications, and alternatives of surgery. Will post for surgery in the coming weeks at the patient's convenience. The patient will obtain Cardiology clearance given her report of PVCs. She reports being followed by Dr. Sophy Neal, but her last appointment was a year ago. Preoperatively will collect CBCD, CMP, CXR, and EKG. All questions and concerns were addressed with the patient and she is comfortable with the plan.      Defined Sensitive Document    >50% of total time allocated to visit dedicated to counseling, 50 minutes total.    Signed By: Jairo Lehman MD     8/10/2021/1:33 PM

## 2021-08-20 ENCOUNTER — TELEPHONE (OUTPATIENT)
Dept: CARDIOLOGY CLINIC | Age: 76
End: 2021-08-20

## 2021-08-20 NOTE — TELEPHONE ENCOUNTER
Gucci Tan MD  You 6 minutes ago (2:33 PM)   MW  She hasn't been seen for over a year, needs to come in or Caitlyn Castaneda can do when back. Message text    Jimmy Bradley MD 13 minutes ago (2:26 PM)   ES  Please advise    Routing comment      TC to pt, ID verified. Advised pt she would need to come in for cardiac clearance. Appt set up with Dr. Caitlyn Castaneda 9/2/21. No further questions.

## 2021-08-20 NOTE — TELEPHONE ENCOUNTER
Patient states she is going to have a procedure done by Dr. Jose Bacon (gyn oncologist) in the immediate future and is requesting cardiac clearance.           PHONE: [phone]         Upcoming appointment 10/13/2021

## 2021-09-02 ENCOUNTER — OFFICE VISIT (OUTPATIENT)
Dept: CARDIOLOGY CLINIC | Age: 76
End: 2021-09-02
Payer: MEDICARE

## 2021-09-02 VITALS
RESPIRATION RATE: 16 BRPM | WEIGHT: 149 LBS | OXYGEN SATURATION: 98 % | DIASTOLIC BLOOD PRESSURE: 70 MMHG | BODY MASS INDEX: 25.44 KG/M2 | HEIGHT: 64 IN | SYSTOLIC BLOOD PRESSURE: 120 MMHG | HEART RATE: 81 BPM

## 2021-09-02 DIAGNOSIS — I49.3 PVC (PREMATURE VENTRICULAR CONTRACTION): ICD-10-CM

## 2021-09-02 DIAGNOSIS — R00.2 PALPITATIONS: ICD-10-CM

## 2021-09-02 DIAGNOSIS — Z01.810 PREOP CARDIOVASCULAR EXAM: Primary | ICD-10-CM

## 2021-09-02 DIAGNOSIS — R94.31 ABNORMAL EKG: ICD-10-CM

## 2021-09-02 PROCEDURE — G0463 HOSPITAL OUTPT CLINIC VISIT: HCPCS | Performed by: SPECIALIST

## 2021-09-02 PROCEDURE — G8536 NO DOC ELDER MAL SCRN: HCPCS | Performed by: SPECIALIST

## 2021-09-02 PROCEDURE — G8427 DOCREV CUR MEDS BY ELIG CLIN: HCPCS | Performed by: SPECIALIST

## 2021-09-02 PROCEDURE — 1101F PT FALLS ASSESS-DOCD LE1/YR: CPT | Performed by: SPECIALIST

## 2021-09-02 PROCEDURE — 99213 OFFICE O/P EST LOW 20 MIN: CPT | Performed by: SPECIALIST

## 2021-09-02 PROCEDURE — G8419 CALC BMI OUT NRM PARAM NOF/U: HCPCS | Performed by: SPECIALIST

## 2021-09-02 PROCEDURE — G8510 SCR DEP NEG, NO PLAN REQD: HCPCS | Performed by: SPECIALIST

## 2021-09-02 PROCEDURE — 1090F PRES/ABSN URINE INCON ASSESS: CPT | Performed by: SPECIALIST

## 2021-09-02 PROCEDURE — G8399 PT W/DXA RESULTS DOCUMENT: HCPCS | Performed by: SPECIALIST

## 2021-09-02 NOTE — PROGRESS NOTES
HISTORY OF PRESENT ILLNESS  Odilia Montiel is a 64 y.o. female     SUMMARY:   Problem List  Date Reviewed: 9/2/2021        Codes Class Noted    Arrhythmia ICD-10-CM: I49.9  ICD-9-CM: 427.9  Unknown    Overview Signed 8/10/2021  1:41 PM by Zaid Ortiz MD     more frequently this year             Thromboembolus [facility]) ICD-10-CM: I74.9  ICD-9-CM: 444.9  Unknown    Overview Signed 8/10/2021  1:41 PM by Zaid Ortiz MD     PE x2             Pericarditis, acute ICD-10-CM: I30.9  ICD-9-CM: 420.90  Unknown        GERD (gastroesophageal reflux disease) ICD-10-CM: K21.9  ICD-9-CM: 530.81  Unknown        Cerebral meningioma ([facility] ICD-10-CM: D32.0  ICD-9-CM: 225.2  Unknown        Vulvar lesion ICD-10-CM: N90.89  ICD-9-CM: 624.8  8/10/2021        Chest pain ICD-10-CM: R07.9  ICD-9-CM: 786.50  1/22/2016        Abnormal EKG ICD-10-CM: R94.31  ICD-9-CM: 794.31  1/22/2016        Recurrent UTI ICD-10-CM: N39.0  ICD-9-CM: 599.0  4/2/2014        Seizure disorder ([facility] ICD-10-CM: G40.909  ICD-9-CM: 345.90  Unknown        Seizure ([facility] ICD-10-CM: R56.9  ICD-9-CM: 780.39  10/23/2009              Current Outpatient Medications on File Prior to Visit   Medication Sig    cyanocobalamin (Vitamin B-12) 100 mcg tablet Take 100 mcg by mouth daily.  Dilantin Extended 100 mg ER capsule TAKE 3 CAPSULES DAILY    cholecalciferol (VITAMIN D3) 1,000 unit tablet Take  by mouth daily.  omega-3 fatty acids-vitamin e (FISH OIL) 1,000 mg cap Take 1 Cap by mouth.  Biotin 2,500 mcg cap Take  by mouth.  folic acid (FOLVITE) 1 mg tablet Take 0.4 mg by mouth daily.  cyanocobalamin (VITAMIN B12) 1,000 mcg/mL injection 1 mL by IntraMUSCular route every seven (7) days. Administer 1 mL IM or SC every 7 days x 4 then switch to OTC B12 5,000 mcg once daily. Dx:E53.8 (Patient not taking: Reported on 8/10/2021)     No current facility-administered medications on file prior to visit.        CARDIOLOGY STUDIES TO DATE:  1/16 normal stress echo  5/20 holter, rare pacs, occasional pvcs  6/20 event monitor sinus rhythm to sinus bradycardia with occasional premature ventricular and atrial contractions  06/02/20  echo normal lvef, lae, trivial mvp with tr regurg    ·    No chief complaint on file. HPI :  She comes in early for an appointment because she needs to have some GYN surgery. Overall she is doing well with occasional PVCs good days and bad days. She did have one episode about 2 months ago where she woke up in the middle of the night and felt like her heart was racing for about 10minutes. According to her watch it said she might have been in A. fib. She has never had that before or since. She is not been exercising recently because of her GYN problems and her arthritis. CARDIAC ROS:   negative for chest pain, dyspnea, syncope, orthopnea, paroxysmal nocturnal dyspnea, exertional chest pressure/discomfort, claudication, lower extremity edema    Family History   Problem Relation Age of Onset    Heart Attack Father     Other Father         Gi bleed    Heart Disease Father         [place]  74yo   Teresa Clementine Alcohol abuse Father     Other Mother         Pneumonia    Heart Disease Mother         CHF/pacemaker    Arthritis-osteo Mother     Asthma Mother     Gall Bladder Disease Mother     Hypertension Mother     Breast Cancer Maternal Aunt 79       Past Medical History:   Diagnosis Date    Arrhythmia 1990's    more frequently this year    Asthma     Bronchitis     Cerebral meningioma ([address] resection 1992    Chronic pain 2000's    Joint pain    Cytomegalovirus ([address]     Nohemi-Danlos syndrome     GERD (gastroesophageal reflux disease)     Osteoarthritis     Other ill-defined conditions(799.89) 1992    brain tumor-benign    Pericarditis, acute     Thromboembolus ([address]     PE x2       GENERAL ROS:  A comprehensive review of systems was negative except for that written in the HPI.     Visit Vitals  /70   Pulse 81   Resp 16 Ht 5' 4\" (1.626 m)   Wt 149 lb (67.6 kg)   SpO2 98%   BMI 25.58 kg/m²       Wt Readings from Last 3 Encounters:   09/02/21 149 lb (67.6 kg)   08/10/21 152 lb (68.9 kg)   08/03/21 155 lb (70.3 kg)            BP Readings from Last 3 Encounters:   09/02/21 120/70   08/10/21 (!) 174/79   05/20/21 132/72       PHYSICAL EXAM  General appearance: alert, cooperative, no distress, appears stated age  Neurologic: Alert and oriented X 3  Neck: supple, symmetrical, trachea midline, no adenopathy, no carotid bruit and no JVD  Lungs: clear to auscultation bilaterally  Heart: regular rate and rhythm, S1, S2 normal, no murmur, click, rub or gallop  Extremities: extremities normal, atraumatic, no cyanosis or edema    Lab Results   Component Value Date/Time    Cholesterol, total 199 06/03/2020 09:21 AM    Cholesterol, total 201 (H) 05/22/2019 10:42 AM    Cholesterol, total 222 (H) 02/02/2016 10:55 AM    Cholesterol, total 219 (H) 04/07/2014 10:24 AM    Cholesterol, total 218 (H) 01/10/2013 03:16 PM    HDL Cholesterol 93 06/03/2020 09:21 AM    HDL Cholesterol 86 05/22/2019 10:42 AM    HDL Cholesterol 106 02/02/2016 10:55 AM    HDL Cholesterol 96 04/07/2014 10:24 AM    HDL Cholesterol 104 01/10/2013 03:16 PM    LDL, calculated 94 06/03/2020 09:21 AM    LDL, calculated 100 (H) 05/22/2019 10:42 AM    LDL, calculated 104 (H) 02/02/2016 10:55 AM    LDL, calculated 112 (H) 04/07/2014 10:24 AM    LDL, calculated 101 (H) 01/10/2013 03:16 PM    Triglyceride 62 06/03/2020 09:21 AM    Triglyceride 73 05/22/2019 10:42 AM    Triglyceride 58 02/02/2016 10:55 AM    Triglyceride 57 04/07/2014 10:24 AM    Triglyceride 64 01/10/2013 03:16 PM    CHOL/HDL Ratio 2.8 11/23/2009 10:44 AM     ASSESSMENT :      She is stable and asymptomatic for the most part on a good medical regimen. She should be fine for GYN surgery without special precautions or further cardiac testing.   I told her should she start having more episodes of rapid heartbeat she needs to let us know and we will provide her with a monitor because if she is having A. fib she should be on oral anticoagulants. current treatment plan is effective, no change in therapy  lab results and schedule of future lab studies reviewed with patient  reviewed diet, exercise and weight control    Encounter Diagnoses   Name Primary?  Preop cardiovascular exam Yes    Palpitations     PVC (premature ventricular contraction)     Abnormal EKG      No orders of the defined types were placed in this encounter. Follow-up and Dispositions    · Return in about 6 months (around 3/2/2022). Duane Gibes, MD  9/2/2021  Please note that this dictation was completed with [employer] the [employer] voice recognition software. Quite often unanticipated grammatical, syntax, homophones, and other interpretive errors are inadvertently transcribed by the computer software. Please disregard these errors. Please excuse any errors that have escaped final proofreading. Thank you.

## 2021-09-08 ENCOUNTER — TELEPHONE (OUTPATIENT)
Dept: CARDIOLOGY CLINIC | Age: 76
End: 2021-09-08

## 2021-09-08 NOTE — TELEPHONE ENCOUNTER
Patient calling to follow up on request for cardiac clearance for gyn procedure.      Dr. Bonny Wray  [phone] fax                YRWKB:[phone]

## 2021-10-08 ENCOUNTER — TRANSCRIBE ORDER (OUTPATIENT)
Dept: REGISTRATION | Age: 76
End: 2021-10-08

## 2021-10-08 DIAGNOSIS — Z01.812 PRE-PROCEDURE LAB EXAM: Primary | ICD-10-CM

## 2021-10-19 ENCOUNTER — HOSPITAL ENCOUNTER (OUTPATIENT)
Dept: PREADMISSION TESTING | Age: 76
Discharge: HOME | End: 2021-10-19
Payer: MEDICARE

## 2021-10-19 ENCOUNTER — HOSPITAL ENCOUNTER (OUTPATIENT)
Dept: GENERAL RADIOLOGY | Age: 76
Discharge: HOME | End: 2021-10-19
Attending: OBSTETRICS & GYNECOLOGY
Payer: MEDICARE

## 2021-10-19 VITALS
HEIGHT: 64 IN | WEIGHT: 152.12 LBS | TEMPERATURE: 98.2 F | DIASTOLIC BLOOD PRESSURE: 84 MMHG | HEART RATE: 76 BPM | SYSTOLIC BLOOD PRESSURE: 137 MMHG | BODY MASS INDEX: 25.97 KG/M2

## 2021-10-19 DIAGNOSIS — Z01.812 PRE-PROCEDURE LAB EXAM: ICD-10-CM

## 2021-10-19 LAB
ALBUMIN SERPL-MCNC: 3.5 G/DL (ref 3.5–5)
ALBUMIN/GLOB SERPL: 1 {RATIO} (ref 1.1–2.2)
ALP SERPL-CCNC: 171 U/L (ref 45–117)
ALT SERPL-CCNC: 20 U/L (ref 12–78)
ANION GAP SERPL CALC-SCNC: 4 MMOL/L (ref 5–15)
AST SERPL-CCNC: 20 U/L (ref 15–37)
ATRIAL RATE: 70 BPM
BASOPHILS # BLD: 0 K/UL (ref 0–0.1)
BASOPHILS NFR BLD: 1 % (ref 0–1)
BILIRUB SERPL-MCNC: 0.3 MG/DL (ref 0.2–1)
BUN SERPL-MCNC: 13 MG/DL (ref 6–20)
BUN/CREAT SERPL: 27 (ref 12–20)
CALCIUM SERPL-MCNC: 9.3 MG/DL (ref 8.5–10.1)
CALCULATED P AXIS, ECG09: 59 DEGREES
CALCULATED R AXIS, ECG10: -38 DEGREES
CALCULATED T AXIS, ECG11: 93 DEGREES
CHLORIDE SERPL-SCNC: 108 MMOL/L (ref 97–108)
CO2 SERPL-SCNC: 29 MMOL/L (ref 21–32)
CREAT SERPL-MCNC: 0.49 MG/DL (ref 0.55–1.02)
DIAGNOSIS, 93000: NORMAL
DIFFERENTIAL METHOD BLD: ABNORMAL
EOSINOPHIL # BLD: 0.1 K/UL (ref 0–0.4)
EOSINOPHIL NFR BLD: 2 % (ref 0–7)
ERYTHROCYTE [DISTWIDTH] IN BLOOD BY AUTOMATED COUNT: 14.3 % (ref 11.5–14.5)
GLOBULIN SER CALC-MCNC: 3.6 G/DL (ref 2–4)
GLUCOSE SERPL-MCNC: 96 MG/DL (ref 65–100)
HCT VFR BLD AUTO: 40.6 % (ref 35–47)
HGB BLD-MCNC: 13.4 G/DL (ref 11.5–16)
IMM GRANULOCYTES # BLD AUTO: 0 K/UL (ref 0–0.04)
IMM GRANULOCYTES NFR BLD AUTO: 0 % (ref 0–0.5)
LYMPHOCYTES # BLD: 1.3 K/UL (ref 0.8–3.5)
LYMPHOCYTES NFR BLD: 22 % (ref 12–49)
MCH RBC QN AUTO: 31.2 PG (ref 26–34)
MCHC RBC AUTO-ENTMCNC: 33 G/DL (ref 30–36.5)
MCV RBC AUTO: 94.6 FL (ref 80–99)
MONOCYTES # BLD: 0.7 K/UL (ref 0–1)
MONOCYTES NFR BLD: 11 % (ref 5–13)
NEUTS SEG # BLD: 3.8 K/UL (ref 1.8–8)
NEUTS SEG NFR BLD: 64 % (ref 32–75)
NRBC # BLD: 0 K/UL (ref 0–0.01)
NRBC BLD-RTO: 0 PER 100 WBC
P-R INTERVAL, ECG05: 160 MS
PLATELET # BLD AUTO: 144 K/UL (ref 150–400)
PMV BLD AUTO: 9 FL (ref 8.9–12.9)
POTASSIUM SERPL-SCNC: 4 MMOL/L (ref 3.5–5.1)
PROT SERPL-MCNC: 7.1 G/DL (ref 6.4–8.2)
Q-T INTERVAL, ECG07: 444 MS
QRS DURATION, ECG06: 84 MS
QTC CALCULATION (BEZET), ECG08: 479 MS
RBC # BLD AUTO: 4.29 M/UL (ref 3.8–5.2)
SODIUM SERPL-SCNC: 141 MMOL/L (ref 136–145)
VENTRICULAR RATE, ECG03: 70 BPM
WBC # BLD AUTO: 5.9 K/UL (ref 3.6–11)

## 2021-10-19 PROCEDURE — 85025 COMPLETE CBC W/AUTO DIFF WBC: CPT

## 2021-10-19 PROCEDURE — U0005 INFEC AGEN DETEC AMPLI PROBE: HCPCS

## 2021-10-19 PROCEDURE — 93005 ELECTROCARDIOGRAM TRACING: CPT

## 2021-10-19 PROCEDURE — 71046 X-RAY EXAM CHEST 2 VIEWS: CPT

## 2021-10-19 PROCEDURE — 80053 COMPREHEN METABOLIC PANEL: CPT

## 2021-10-19 RX ORDER — DICLOFENAC SODIUM 75 MG/1
75 TABLET, DELAYED RELEASE ORAL
COMMUNITY
End: 2022-03-01

## 2021-10-19 RX ORDER — ACETAMINOPHEN 500 MG
TABLET ORAL
COMMUNITY

## 2021-10-19 RX ORDER — LANOLIN ALCOHOL/MO/W.PET/CERES
5000 CREAM (GRAM) TOPICAL DAILY
COMMUNITY

## 2021-10-19 RX ORDER — MINOXIDIL 2 %
SOLUTION, NON-ORAL TOPICAL 2 TIMES DAILY
COMMUNITY

## 2021-10-19 RX ORDER — UREA 10 %
800 LOTION (ML) TOPICAL DAILY
COMMUNITY

## 2021-10-19 RX ORDER — DICLOFENAC SODIUM 10 MG/G
GEL TOPICAL AS NEEDED
COMMUNITY
End: 2022-03-01

## 2021-10-19 NOTE — PERIOP NOTES
PATIENT GIVEN SURGICAL SITE INFECTION FAQ HANDOUT AND HAND WASHING TIP SHEET. PREOP INSTRUCTIONS REVIEWED AND PATIENT VERBALIZES UNDERSTANDING OF INSTRUCTIONS. PATIENT HAS BEEN GIVEN THE OPPORTUNITY TO ASK QUESTIONS.   HIBICLENS AND COVID INSTRUCTIONS WERE GIVEN TO THE PT.

## 2021-10-20 LAB
SARS-COV-2, XPLCVT: NOT DETECTED
SOURCE, COVRS: NORMAL

## 2021-10-22 ENCOUNTER — HOSPITAL ENCOUNTER (OUTPATIENT)
Age: 76
Setting detail: OUTPATIENT SURGERY
Discharge: HOME | End: 2021-10-22
Attending: OBSTETRICS & GYNECOLOGY | Admitting: OBSTETRICS & GYNECOLOGY
Payer: MEDICARE

## 2021-10-22 ENCOUNTER — ANESTHESIA EVENT (OUTPATIENT)
Dept: SURGERY | Age: 76
End: 2021-10-22
Payer: MEDICARE

## 2021-10-22 ENCOUNTER — ANESTHESIA (OUTPATIENT)
Dept: SURGERY | Age: 76
End: 2021-10-22
Payer: MEDICARE

## 2021-10-22 VITALS
DIASTOLIC BLOOD PRESSURE: 64 MMHG | HEART RATE: 67 BPM | WEIGHT: 152.12 LBS | BODY MASS INDEX: 25.97 KG/M2 | SYSTOLIC BLOOD PRESSURE: 140 MMHG | OXYGEN SATURATION: 99 % | RESPIRATION RATE: 14 BRPM | HEIGHT: 64 IN | TEMPERATURE: 97.3 F

## 2021-10-22 PROCEDURE — 77030031139 HC SUT VCRL2 J&J -A: Performed by: OBSTETRICS & GYNECOLOGY

## 2021-10-22 PROCEDURE — 77030040361 HC SLV COMPR DVT MDII -B: Performed by: OBSTETRICS & GYNECOLOGY

## 2021-10-22 PROCEDURE — 76210000016 HC OR PH I REC 1 TO 1.5 HR: Performed by: OBSTETRICS & GYNECOLOGY

## 2021-10-22 PROCEDURE — 74011000250 HC RX REV CODE- 250: Performed by: NURSE ANESTHETIST, CERTIFIED REGISTERED

## 2021-10-22 PROCEDURE — 88309 TISSUE EXAM BY PATHOLOGIST: CPT

## 2021-10-22 PROCEDURE — 74011250636 HC RX REV CODE- 250/636: Performed by: STUDENT IN AN ORGANIZED HEALTH CARE EDUCATION/TRAINING PROGRAM

## 2021-10-22 PROCEDURE — 74011250636 HC RX REV CODE- 250/636: Performed by: NURSE ANESTHETIST, CERTIFIED REGISTERED

## 2021-10-22 PROCEDURE — 2709999900 HC NON-CHARGEABLE SUPPLY: Performed by: OBSTETRICS & GYNECOLOGY

## 2021-10-22 PROCEDURE — 76010000138 HC OR TIME 0.5 TO 1 HR: Performed by: OBSTETRICS & GYNECOLOGY

## 2021-10-22 PROCEDURE — 74011000272 HC RX REV CODE- 272: Performed by: OBSTETRICS & GYNECOLOGY

## 2021-10-22 PROCEDURE — 77030011283 HC ELECTRD NDL COVD -A: Performed by: OBSTETRICS & GYNECOLOGY

## 2021-10-22 PROCEDURE — 76060000032 HC ANESTHESIA 0.5 TO 1 HR: Performed by: OBSTETRICS & GYNECOLOGY

## 2021-10-22 PROCEDURE — 74011250637 HC RX REV CODE- 250/637: Performed by: STUDENT IN AN ORGANIZED HEALTH CARE EDUCATION/TRAINING PROGRAM

## 2021-10-22 PROCEDURE — 77030010509 HC AIRWY LMA MSK TELE -A: Performed by: STUDENT IN AN ORGANIZED HEALTH CARE EDUCATION/TRAINING PROGRAM

## 2021-10-22 PROCEDURE — 74011000250 HC RX REV CODE- 250: Performed by: OBSTETRICS & GYNECOLOGY

## 2021-10-22 RX ORDER — SODIUM CHLORIDE 0.9 % (FLUSH) 0.9 %
5-40 SYRINGE (ML) INJECTION AS NEEDED
Status: DISCONTINUED | OUTPATIENT
Start: 2021-10-22 | End: 2021-10-22 | Stop reason: HOSPADM

## 2021-10-22 RX ORDER — LIDOCAINE HYDROCHLORIDE 20 MG/ML
INJECTION, SOLUTION EPIDURAL; INFILTRATION; INTRACAUDAL; PERINEURAL AS NEEDED
Status: DISCONTINUED | OUTPATIENT
Start: 2021-10-22 | End: 2021-10-22 | Stop reason: HOSPADM

## 2021-10-22 RX ORDER — MIDAZOLAM HYDROCHLORIDE 1 MG/ML
INJECTION, SOLUTION INTRAMUSCULAR; INTRAVENOUS AS NEEDED
Status: DISCONTINUED | OUTPATIENT
Start: 2021-10-22 | End: 2021-10-22 | Stop reason: HOSPADM

## 2021-10-22 RX ORDER — DEXAMETHASONE SODIUM PHOSPHATE 4 MG/ML
INJECTION, SOLUTION INTRA-ARTICULAR; INTRALESIONAL; INTRAMUSCULAR; INTRAVENOUS; SOFT TISSUE AS NEEDED
Status: DISCONTINUED | OUTPATIENT
Start: 2021-10-22 | End: 2021-10-22 | Stop reason: HOSPADM

## 2021-10-22 RX ORDER — ACETAMINOPHEN 325 MG/1
650 TABLET ORAL ONCE
Status: COMPLETED | OUTPATIENT
Start: 2021-10-22 | End: 2021-10-22

## 2021-10-22 RX ORDER — SODIUM CHLORIDE 0.9 % (FLUSH) 0.9 %
5-40 SYRINGE (ML) INJECTION EVERY 8 HOURS
Status: DISCONTINUED | OUTPATIENT
Start: 2021-10-22 | End: 2021-10-22 | Stop reason: HOSPADM

## 2021-10-22 RX ORDER — LIDOCAINE HYDROCHLORIDE AND EPINEPHRINE 10; 10 MG/ML; UG/ML
INJECTION, SOLUTION INFILTRATION; PERINEURAL AS NEEDED
Status: DISCONTINUED | OUTPATIENT
Start: 2021-10-22 | End: 2021-10-22 | Stop reason: HOSPADM

## 2021-10-22 RX ORDER — ACETIC ACID 5 %
LIQUID (ML) MISCELLANEOUS AS NEEDED
Status: DISCONTINUED | OUTPATIENT
Start: 2021-10-22 | End: 2021-10-22 | Stop reason: HOSPADM

## 2021-10-22 RX ORDER — FENTANYL CITRATE 50 UG/ML
INJECTION, SOLUTION INTRAMUSCULAR; INTRAVENOUS AS NEEDED
Status: DISCONTINUED | OUTPATIENT
Start: 2021-10-22 | End: 2021-10-22 | Stop reason: HOSPADM

## 2021-10-22 RX ORDER — ONDANSETRON 2 MG/ML
4 INJECTION INTRAMUSCULAR; INTRAVENOUS AS NEEDED
Status: DISCONTINUED | OUTPATIENT
Start: 2021-10-22 | End: 2021-10-22 | Stop reason: HOSPADM

## 2021-10-22 RX ORDER — PROPOFOL 10 MG/ML
INJECTION, EMULSION INTRAVENOUS AS NEEDED
Status: DISCONTINUED | OUTPATIENT
Start: 2021-10-22 | End: 2021-10-22 | Stop reason: HOSPADM

## 2021-10-22 RX ORDER — ONDANSETRON 2 MG/ML
INJECTION INTRAMUSCULAR; INTRAVENOUS AS NEEDED
Status: DISCONTINUED | OUTPATIENT
Start: 2021-10-22 | End: 2021-10-22 | Stop reason: HOSPADM

## 2021-10-22 RX ORDER — SODIUM CHLORIDE, SODIUM LACTATE, POTASSIUM CHLORIDE, CALCIUM CHLORIDE 600; 310; 30; 20 MG/100ML; MG/100ML; MG/100ML; MG/100ML
50 INJECTION, SOLUTION INTRAVENOUS CONTINUOUS
Status: DISCONTINUED | OUTPATIENT
Start: 2021-10-22 | End: 2021-10-22 | Stop reason: HOSPADM

## 2021-10-22 RX ADMIN — LIDOCAINE HYDROCHLORIDE 80 MG: 20 INJECTION, SOLUTION EPIDURAL; INFILTRATION; INTRACAUDAL; PERINEURAL at 07:34

## 2021-10-22 RX ADMIN — DEXAMETHASONE SODIUM PHOSPHATE 4 MG: 4 INJECTION, SOLUTION INTRAMUSCULAR; INTRAVENOUS at 07:42

## 2021-10-22 RX ADMIN — PROPOFOL 125 MG: 10 INJECTION, EMULSION INTRAVENOUS at 07:34

## 2021-10-22 RX ADMIN — FENTANYL CITRATE 25 MCG: 50 INJECTION, SOLUTION INTRAMUSCULAR; INTRAVENOUS at 07:38

## 2021-10-22 RX ADMIN — FENTANYL CITRATE 25 MCG: 50 INJECTION, SOLUTION INTRAMUSCULAR; INTRAVENOUS at 07:34

## 2021-10-22 RX ADMIN — ONDANSETRON HYDROCHLORIDE 4 MG: 2 INJECTION, SOLUTION INTRAMUSCULAR; INTRAVENOUS at 07:42

## 2021-10-22 RX ADMIN — SODIUM CHLORIDE, POTASSIUM CHLORIDE, SODIUM LACTATE AND CALCIUM CHLORIDE 50 ML/HR: 600; 310; 30; 20 INJECTION, SOLUTION INTRAVENOUS at 06:45

## 2021-10-22 RX ADMIN — ACETAMINOPHEN 650 MG: 325 TABLET ORAL at 07:14

## 2021-10-22 RX ADMIN — MIDAZOLAM 1 MG: 1 INJECTION INTRAMUSCULAR; INTRAVENOUS at 07:30

## 2021-10-22 NOTE — ANESTHESIA POSTPROCEDURE EVALUATION
Post-Anesthesia Evaluation and Assessment    Patient: Katherine Gallardo MRN: [id number]  SSN: [id number]    YOB: 1945  Age: 64 y.o. Sex: female      I have evaluated the patient and they are stable and ready for discharge from the PACU. Cardiovascular Function/Vital Signs  Visit Vitals  BP (!) 140/64   Pulse 67   Temp 36.3 °C (97.3 °F)   Resp 14   Ht 5' 4\" (1.626 m)   Wt 69 kg (152 lb 1.9 oz)   SpO2 99%   BMI 26.11 kg/m²       Patient is status post General anesthesia for Procedure(s):  SIMPLE VULVECTOMY. Nausea/Vomiting: None    Postoperative hydration reviewed and adequate. Pain:  Pain Scale 1: Numeric (0 - 10) (10/22/21 0845)  Pain Intensity 1: 0 (10/22/21 0845)   Managed    Neurological Status:   Neuro (WDL): Within Defined Limits (10/22/21 0845)   At baseline    Mental Status, Level of Consciousness: Alert and  oriented to person, place, and time    Pulmonary Status:   O2 Device: None (Room air) (10/22/21 0845)   Adequate oxygenation and airway patent    Complications related to anesthesia: None    Post-anesthesia assessment completed.  No concerns    Signed By: Elia Way DO     October 22, 2021

## 2021-10-22 NOTE — H&P
[facility] [address]  P [phone]  F [phone]    Office Note  Patient ID:  Name:  Kamila Leone  MRN:  [id number]  :   y.o. Date:  10/22/2021      HISTORY OF PRESENT ILLNESS:  Ms. Kamila Leone is a 64 y.o.  postmenopausal female who presents as a new patient from Dr. Apolinar Toth for a new vulvar lesion. The patient reports that she has had this lesion since . She has had it examined before, but reports it appears larger now. She reports that she keeps messing with it. Denies bleeding or discharge from the lesion. Reports it is more bothersome now. Denies vaginal bleeding/discharge. Pertinent PMH/PSH: h/o PE (at the time of an ectopic pregnancy in her 25s), cerebral meningioma (s/p resection in ), h/o PVCs? Active, no restrictions. Imaging Review:   n/a    ROS:  A comprehensive review of systems was negative except for that written in the History of Present Illness.  , 10 point ROS    OB/GYN ROS:  Postmenopausal.     ECOG ndGndrndanddndend:nd nd2nd Problem List:  Patient Active Problem List    Diagnosis Date Noted    Vulvar lesion 08/10/2021    Arrhythmia     Thromboembolus ([address]     Pericarditis, acute     GERD (gastroesophageal reflux disease)     Cerebral meningioma ([address]     Chest pain 2016    Abnormal EKG 2016    Recurrent UTI 2014    Seizure disorder ([address]     Seizure ([address] 10/23/2009     PMH:  Past Medical History:   Diagnosis Date    Arrhythmia 's    more frequently this year    Asthma     Bronchitis     Cancer ([address]     Skin cancer    Cerebral meningioma ([address] resection     Cytomegalovirus ([address]     Nohemi-Danlos syndrome     GERD (gastroesophageal reflux disease)     Osteoarthritis     Other ill-defined conditions(799.89)     brain tumor-benign    Pericarditis, acute     Thromboembolus ([address] 1975    PE x2      PSH:  Past Surgical History:   Procedure Laterality Date    HX ADENOIDECTOMY      HX CHOLECYSTECTOMY      HX ENDOSCOPY      HX GI      COLONOSCOPY    HX GYN  1973    ectopic pregnancy    HX GYN  1996    VULVECTOMY    HX HEENT  1992    removed brain tumor and plate inserted    HX HERNIA REPAIR      bilateral    HX ORTHOPAEDIC      bilateral knee surgery     HX ORTHOPAEDIC      BONE TAKEN OUT OF WRIST    HX OTHER SURGICAL      SKIN CA REMOVED FACE AND HEAD    HX TONSILLECTOMY      HX UROLOGICAL  2005    SLING    NEUROLOGICAL PROCEDURE UNLISTED      meningioma    [facility] COLONOSCOPY FLX DX W/COLLJ SPEC WHEN PFRMD  2011         WV EXCIS INFRATENT MENINGIOMA      [facility] FOREARM/WRIST SURGERY UNLISTED  2010    Right    [facility] TREAT ECTOPIC PREG,ABD PREG        Social History:  Social History     Tobacco Use    Smoking status: Former Smoker     Packs/day: 0.00     Years: 10.00     Pack years: 0.00     Types: Cigarettes     Quit date: 1973     Years since quittin.9    Smokeless tobacco: Never Used    Tobacco comment: Stopped in    Substance Use Topics    Alcohol use: No     Alcohol/week: 0.0 standard drinks      Family History:  Family History   Problem Relation Age of Onset    Heart Attack Father     Other Father         Gi bleed    Heart Disease Father         MI  74yo   [facility] Alcohol abuse Father     Arthritis-osteo Father         gout    Other Mother         Pneumonia    Heart Disease Mother         CHF/pacemaker    Arthritis-osteo Mother     Asthma Mother    Ivon Blanca Bladder Disease Mother     Hypertension Mother     Breast Cancer Maternal Aunt 79    Arthritis-osteo Paternal Grandfather         Rheumatoid      Medications: (reviewed)  Current Facility-Administered Medications   Medication Dose Route Frequency    lactated Ringers infusion  50 mL/hr IntraVENous CONTINUOUS    sodium chloride (NS) flush 5-40 mL  5-40 mL IntraVENous Q8H    sodium chloride (NS) flush 5-40 mL  5-40 mL IntraVENous PRN    acetaminophen (TYLENOL) tablet 650 mg  650 mg Oral ONCE     Allergies: (reviewed)  Allergies   Allergen Reactions    Epinephrine Unknown (comments)    Sulfa (Sulfonamide Antibiotics) Other (comments)     Kidneys shut down    Ephedrine Palpitations    Nsaids (Non-Steroidal Anti-Inflammatory Drug) Diarrhea     Indigestion, nausea    Advil [Ibuprofen] Other (comments)     Fluid retention.  Ampicillin Unknown (comments)     Pt is unsure if she is still allergic to [facility], was told to stay away b/c of repeated use as a child.  Codeine Unknown (comments)    Demerol [Meperidine] Unknown (comments)    Dilaudid [Hydromorphone (Bulk)] Unknown (comments)    Erythromycin Unknown (comments)    Levaquin [Levofloxacin] Other (comments)     GI upset    Lidocaine Shortness of Breath     Pt is unsure if she is allergic to lidocaine, pt reports she thinks she received lidocaine w/ epinephrine and had a reaction.  Ciprofloxacin Nausea and Vomiting          OBJECTIVE:    Physical Exam:  VITAL SIGNS: Vitals:    10/22/21 0621   BP: (!) 152/83   Pulse: 82   Resp: 14   Temp: 98.8 °F (37.1 °C)   SpO2: 94%   Weight: 152 lb 1.9 oz (69 kg)   Height: 5' 4\" (1.626 m)     Body mass index is 26.11 kg/m². GENERAL ERVIN: Conversant, alert, oriented. No acute distress. HEENT: HEENT. No thyroid enlargement. No JVD. Neck: Supple without restrictions. RESPIRATORY: Clear to auscultation and percussion to the bases. No CVAT. CARDIOVASC: RRR without murmur/rub. GASTROINT: soft, non-tender, without masses or organomegaly   MUSCULOSKEL: no joint tenderness, deformity or swelling       EXTREMITIES: extremities normal, atraumatic, no cyanosis or edema   PELVIC: Exam chaperoned by nurse. Normal appearing external genitalia, except for a 1cm raised condylomatous appearing lesion at 5 o'clock. No outright evidence of dysplasia or malignancy. On speculum exam, normal appearing vagina and cervix.  On bimanual exam, the cervix and uterus are normal size and mobile. No evidence of adnexal masses or nodularity. RECTAL: deferred   JODIE SURVEY: No suspicious lymphadenopathy or edema noted. NEURO: Grossly intact. No acute deficit. Lab Date as available:    Lab Results   Component Value Date/Time    WBC 5.9 10/19/2021 11:04 AM    HGB 13.4 10/19/2021 11:04 AM    HCT 40.6 10/19/2021 11:04 AM    PLATELET 700 (L) 73/34/7547 11:04 AM    MCV 94.6 10/19/2021 11:04 AM     Lab Results   Component Value Date/Time    Sodium 141 10/19/2021 11:04 AM    Potassium 4.0 10/19/2021 11:04 AM    Chloride 108 10/19/2021 11:04 AM    CO2 29 10/19/2021 11:04 AM    Anion gap 4 (L) 10/19/2021 11:04 AM    Glucose 96 10/19/2021 11:04 AM    BUN 13 10/19/2021 11:04 AM    Creatinine 0.49 (L) 10/19/2021 11:04 AM    BUN/Creatinine ratio 27 (H) 10/19/2021 11:04 AM    GFR est AA >60 10/19/2021 11:04 AM    GFR est non-AA >60 10/19/2021 11:04 AM    Calcium 9.3 10/19/2021 11:04 AM         IMPRESSION/PLAN:    Ms. Mary Carroll is a 64 y.o. female with a working diagnosis of vulvar lesion, which appears HPV-related and benign. Problems:     Patient Active Problem List    Diagnosis Date Noted    Vulvar lesion 08/10/2021    Arrhythmia     Thromboembolus ([address]     Pericarditis, acute     GERD (gastroesophageal reflux disease)     Cerebral meningioma ([address]     Chest pain 01/22/2016    Abnormal EKG 01/22/2016    Recurrent UTI 04/02/2014    Seizure disorder ([address]     Seizure ([address] 10/23/2009       I reviewed Ms. Nilsa Shaffer's course to date, including her medical records, recent studies, physical exam, and review of symptoms. 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 I have recommended a pelvic exam under anesthesia with simple vulvectomy. Reviewed risks, benefits, indications, and alternatives of surgery.  Will post for surgery in the coming weeks at the patient's convenience. The patient will obtain Cardiology clearance given her report of PVCs. She reports being followed by Dr. Ml Dwyer, but her last appointment was a year ago. Preoperatively will collect CBCD, CMP, CXR, and EKG. All questions and concerns were addressed with the patient and she is comfortable with the plan. Defined Sensitive Document    >50% of total time allocated to visit dedicated to counseling, 50 minutes total.    Signed By: Manuel Olmos MD     10/22/2021/1:33 PM     Date of Surgery Update:  Arianna Beltran was seen and examined. History and physical has been reviewed. The patient has been examined.  There have been no significant clinical changes since the completion of the originally dated History and Physical.    Signed By: Manuel Olmos MD     October 22, 2021 6:55 AM

## 2021-10-22 NOTE — BRIEF OP NOTE
Brief Postoperative Note    Patient: Deshawn Stratton  YOB: 1945  MRN: [id number]    Date of Procedure: 10/22/2021     Pre-Op Diagnosis: Vulvar lesion [N90.89]    Post-Op Diagnosis: Same as preoperative diagnosis. Procedure(s):  SIMPLE VULVECTOMY    Surgeon(s): Danny Aburto MD    Surgical Assistant: None    Anesthesia: General     Estimated Blood Loss (mL): Minimal    Complications: None    Specimens:   ID Type Source Tests Collected by Time Destination   1 : VULVECTOMY 5 OCLOCK Fresh VULVA  Danny Aburto MD 10/22/2021 3485 Pathology        Implants: * No implants in log *    Drains: * No LDAs found *    Findings: On exam under anesthesia, there is a 1-cm vulvar lesion located a 5 o'clock, which resembles a condylomatous lesion.      Electronically Signed by Teto Oliveira MD on 10/22/2021 at 8:11 AM

## 2021-10-22 NOTE — OP NOTES
Gynecologic Oncology Operative Report    Susan Gaspar    Pre-operative dx: 1) Vulvar lesion    Post-operative dx: 1) Vulvar lesion    Procedure:  Simple vulvectomy    Surgeon:  Surendra Harp MD    Assistant: n/a     Anesthesia:  LMA    EBL:  minimal    Complications:  None    Implants: none    Specimens: vulvectomy     Operative indications: 64 y.o. female with 1cm raised condylomatous vulvar lesion     Operative findings: On exam under anesthesia, there is a 1-cm vulvar lesion located a 5 o'clock, which resembles a condylomatous lesion. Operative report: After the risks, benefits, indications, and alternatives of the procedure were discussed with the patient and informed consent was obtained, the patient was taken to the operating room. She was positively identified, administered general anesthesia, and then placed in the dorsal lithotomy position in [address] [place] [address]. An exam under anesthesia was performed with the above noted findings. She was then prepped and draped in the usual fashion. The bladder was drained with a red rubber catheter. The lesion above was then outlined using a marking pen with an approximate margin of 1cm circumferentially. Using a 15-blade, an incision was made along the markings circumferentially. Then using a needlepoint Bovie electrocautery, the lesion was excised with full thickness through the epidermis and dermis. The specimen was then sent for permanent pathology. The surgical wound was then irrigated. The skin edges were then re-approximated with 2-0 Vicryl using vertical mattress sutures and simple-interrupted sutures where appropriate. The vulva and perineum were irrigated and hemostasis confirmed. Bacitracin was applied to the wound edges. The patient was taken out of stirrups, awakened from anesthesia, and taken to the recovery room in stable condition. All instrument, sponge, and needle counts were correct.         Benito Warner MD  10/22/2021  8:12 AM

## 2021-10-22 NOTE — ANESTHESIA PREPROCEDURE EVALUATION
Relevant Problems   No relevant active problems       Anesthetic History   No history of anesthetic complications            Review of Systems / Medical History  Patient summary reviewed, nursing notes reviewed and pertinent labs reviewed    Pulmonary            Asthma     Comments: PE in 1970s, 6 mo AC    Neuro/Psych     seizures: well controlled        Comments: Cerebral meningioma sp resection in 1990s  Cardiovascular            Dysrhythmias : PVC      Exercise tolerance: >4 METS  Comments: Prolonged QTc (479)   GI/Hepatic/Renal     GERD           Endo/Other        Arthritis     Other Findings   Comments: Nohemi Danlos syndrome   Large vessel in right nare that frequently bleeds         Physical Exam    Airway  Mallampati: II  TM Distance: 4 - 6 cm  Neck ROM: normal range of motion        Cardiovascular  Regular rate and rhythm,  S1 and S2 normal,  no murmur, click, rub, or gallop             Dental  No notable dental hx       Pulmonary  Breath sounds clear to auscultation               Abdominal  GI exam deferred       Other Findings            Anesthetic Plan    ASA: 3  Anesthesia type: general          Induction: Intravenous  Anesthetic plan and risks discussed with: Patient

## 2021-10-27 ENCOUNTER — TELEPHONE (OUTPATIENT)
Dept: GYNECOLOGY | Age: 76
End: 2021-10-27

## 2021-10-27 NOTE — TELEPHONE ENCOUNTER
Please call re: ointment she was handed when she was discharged following her surgery. She said there was no instructions with it and she's unsure how she should be applying it.

## 2021-10-27 NOTE — TELEPHONE ENCOUNTER
Per Dr. Minal Farias, pt probably doesn't need to use the cream and to keep area clean and dry, pt verbalized understanding

## 2021-12-06 NOTE — PROGRESS NOTES
Post op Visit, surgery was on 10/22/2021    1. Have you been to the ER, urgent care clinic since your last visit? Hospitalized since your last visit? Yes, surgery with Dr. Belle Peña on 10/22/2021    2. Have you seen or consulted any other health care providers outside of the [address] [place] [address] since your last visit? Include any pap smears or colon screening.    no

## 2021-12-07 ENCOUNTER — OFFICE VISIT (OUTPATIENT)
Dept: GYNECOLOGY | Age: 76
End: 2021-12-07
Payer: MEDICARE

## 2021-12-07 VITALS
BODY MASS INDEX: 27.25 KG/M2 | HEART RATE: 75 BPM | WEIGHT: 159.6 LBS | HEIGHT: 64 IN | SYSTOLIC BLOOD PRESSURE: 154 MMHG | DIASTOLIC BLOOD PRESSURE: 90 MMHG

## 2021-12-07 DIAGNOSIS — D07.1 VULVAR INTRAEPITHELIAL NEOPLASIA (VIN) GRADE 3: Primary | ICD-10-CM

## 2021-12-07 PROCEDURE — 99024 POSTOP FOLLOW-UP VISIT: CPT | Performed by: OBSTETRICS & GYNECOLOGY

## 2021-12-13 PROBLEM — D07.1 VULVAR INTRAEPITHELIAL NEOPLASIA (VIN) GRADE 3: Status: ACTIVE | Noted: 2021-12-13

## 2021-12-13 NOTE — PROGRESS NOTES
[address] [facility] [address], [address] [facility]  P [phone]  F [phone]    Office Note  Patient ID:  Name:  Katherine Gallardo  MRN:  [id number]  :   y.o. Date:  2021      HISTORY OF PRESENT ILLNESS:  Ms. Katherine Gallardo is a 64 y.o.  female who on 10/22/2021 underwent simple vulvectomy for a vulvar lesion. Final pathology consistent with MELINDA-3. Negative margins. Presents today for postoperative visit and pathology discussion. Doing well without complaints. Initial History:  Ms. Katherine Gallardo is a 64 y.o.  postmenopausal female who presents as a new patient from Dr. Benjamin Lemons for a new vulvar lesion. The patient reports that she has had this lesion since . She has had it examined before, but reports it appears larger now. She reports that she keeps messing with it. Denies bleeding or discharge from the lesion. Reports it is more bothersome now. Denies vaginal bleeding/discharge. Pertinent PMH/PSH: h/o PE (at the time of an ectopic pregnancy in her 25s), cerebral meningioma (s/p resection in ), h/o PVCs? Active, no restrictions. Pathology Review:   10/22/2021:  * * *FINAL PATHOLOGIC DIAGNOSIS* * *   <<<<<       Vulva, lesion, 5:00; partial vulvectomy:        High-grade squamous intraepithelial lesion (MELINDA III). Margins are negative for MELINDA III, with high-grade dysplasia focally   approaching one inked and cauterized tissue edge. ROS:  A comprehensive review of systems was negative except for that written in the History of Present Illness.  , 10 point ROS    OB/GYN ROS:  Postmenopausal.     ECOG ndGndrndanddndend:nd nd2nd Problem List:  Patient Active Problem List    Diagnosis Date Noted    Vulvar lesion 08/10/2021    Arrhythmia     Thromboembolus ([address]     Pericarditis, acute     GERD (gastroesophageal reflux disease)     Cerebral meningioma ([address]     Chest pain 2016    Abnormal EKG 2016    Recurrent UTI 2014    [facility] disorder ([facility]     Seizure ([address] 10/23/2009     PMH:  Past Medical History:   Diagnosis Date    Arrhythmia 's    more frequently this year    Asthma     Bronchitis     Cancer ([facility]     Skin cancer    Cerebral meningioma ([facility] resection 1992    Cytomegalovirus ([facility]     Nohemi-Danlos syndrome     GERD (gastroesophageal reflux disease)     Osteoarthritis     Other ill-defined conditions(799.89)     brain tumor-benign    Pericarditis, acute     Thromboembolus ([facility] 1975    PE x2      PSH:  Past Surgical History:   Procedure Laterality Date    HX ADENOIDECTOMY      HX CHOLECYSTECTOMY      HX ENDOSCOPY      HX GI      COLONOSCOPY    HX GYN      ectopic pregnancy    HX GYN  1996    VULVECTOMY    HX HEENT      removed brain tumor and plate inserted    HX HERNIA REPAIR      bilateral    HX ORTHOPAEDIC      bilateral knee surgery     HX ORTHOPAEDIC      BONE TAKEN OUT OF WRIST    HX OTHER SURGICAL      SKIN CA REMOVED FACE AND HEAD    HX TONSILLECTOMY      HX UROLOGICAL  2005    SLING    NEUROLOGICAL PROCEDURE UNLISTED      meningioma    MT COLONOSCOPY FLX DX W/COLLJ SPEC WHEN PFRMD  2011         MT EXCIS INFRATENT MENINGIOMA      MT FOREARM/WRIST SURGERY UNLISTED  2010    Right    MT TREAT ECTOPIC PREG,ABD PREG        Social History:  Social History     Tobacco Use    Smoking status: Former Smoker     Packs/day: 0.00     Years: 10.00     Pack years: 0.00     Types: Cigarettes     Quit date: 1973     Years since quittin.1    Smokeless tobacco: Never Used    Tobacco comment: Stopped in    Substance Use Topics    Alcohol use: No     Alcohol/week: 0.0 standard drinks      Family History:  Family History   Problem Relation Age of Onset    Heart Attack Father     Other Father         Gi bleed    Heart Disease Father         [place]  74[facility] Alcohol abuse Father     OSTEOARTHRITIS Father         gout    Other Mother Pneumonia    Heart Disease Mother         CHF/pacemaker    OSTEOARTHRITIS Mother     Asthma Mother    [place]ise [place] Bladder Disease Mother     Hypertension Mother     Breast Cancer Maternal Aunt 79    OSTEOARTHRITIS Paternal Grandfather         Rheumatoid      Medications: (reviewed)  Current Outpatient Medications   Medication Sig    cetirizine HCl (ZYRTEC PO) Take  by mouth.  vit A/vit C/vit E/zinc/copper (PRESERVISION AREDS PO) Take  by mouth.  folic acid (FOLVITE) 319 mcg tablet Take 800 mcg by mouth daily.  cyanocobalamin (Vitamin B-12) 500 mcg tablet Take 500 mcg by mouth daily.  minoxidiL (ROGAINE) 2 % external solution Apply  to affected area two (2) times a day. DROPS    diclofenac (VOLTAREN) 1 % gel Apply  to affected area as needed for Pain.  diclofenac EC (VOLTAREN) 75 mg EC tablet Take 75 mg by mouth two (2) times daily as needed for Pain.  acetaminophen (Tylenol Extra Strength) 500 mg tablet Take  by mouth every six (6) hours as needed for Pain.  Dilantin Extended 100 mg ER capsule TAKE 3 CAPSULES DAILY    cholecalciferol (VITAMIN D3) 1,000 unit tablet Take  by mouth daily.  omega-3 fatty acids-vitamin e (FISH OIL) 1,000 mg cap Take 1 Cap by mouth.  Biotin 2,500 mcg cap Take  by mouth daily.  loratadine (CLARITIN PO) Take 1 Tablet by mouth as needed. (Patient not taking: Reported on 12/7/2021)     No current facility-administered medications for this visit. Allergies: (reviewed)  Allergies   Allergen Reactions    Epinephrine Unknown (comments)    Sulfa (Sulfonamide Antibiotics) Other (comments)     Kidneys shut down    Ephedrine Palpitations    Nsaids (Non-Steroidal Anti-Inflammatory Drug) Diarrhea     Indigestion, nausea    Advil [Ibuprofen] Other (comments)     Fluid retention.  Ampicillin Unknown (comments)     Pt is unsure if she is still allergic to [facility], was told to stay away b/c of repeated use as a child.     Codeine Unknown (comments)    Demerol [Meperidine] Unknown (comments)    Dilaudid [Hydromorphone (Bulk)] Unknown (comments)    Erythromycin Unknown (comments)    Levaquin [Levofloxacin] Other (comments)     GI upset    Lidocaine Shortness of Breath     Pt is unsure if she is allergic to lidocaine, pt reports she thinks she received lidocaine w/ epinephrine and had a reaction.  Ciprofloxacin Nausea and Vomiting          OBJECTIVE:  Physical Exam:  Visit Vitals  BP (!) 154/90 (BP 1 Location: Left arm, BP Patient Position: Sitting)   Pulse 75   Ht 5' 4\" (1.626 m)   Wt 159 lb 9.6 oz (72.4 kg)   BMI 27.40 kg/m²      General: Alert and oriented. No acute distress. Well-nourishedGastrointestinal: soft, non-tender, non-distended, no masses or organomegaly. Well-healed incision. Musculoskeletal: normal gait. No joint tenderness, deformity or swelling. No muscular tenderness. Extremities: extremities normal, atraumatic, no cyanosis or edema. Pelvic: exam chaperoned by nurse. Normal appearing external genitalia. Her incision has healed in well and continues to heal. Vaginal exam deferred. Neuro: Grossly intact. Normal gait and movement. No acute deficit  Skin: No evidence of rashes or skin changes. IMPRESSION/PLAN:    Ms. Aundrea Holman is a 64 y.o. female who on 10/22/2021 underwent simple vulvectomy for a vulvar lesion. Final pathology consistent with MELINDA-3. Negative margins. Problems:     Patient Active Problem List    Diagnosis Date Noted    Vulvar lesion 08/10/2021    Arrhythmia     Thromboembolus ([address]     Pericarditis, acute     GERD (gastroesophageal reflux disease)     C[facility]bral [facility]oma ([facility]     Chest pain 01/22/2016    Abnormal EKG 01/22/2016    Recurrent UTI 04/02/2014    Seizure disorder ([address]     Seizure ([address] 10/23/2009       Reviewed patient's course to date, including her surgical pathology as per above. She has healed well on exam. RTC in 6 months for continued surveillance.  Reviewed precautionary symptoms to return sooner. All questions and concerns were addressed with the patient and she is comfortable with the plan. An electronic signature was used to authenticate this note.      Asher Gutierrez MD

## 2021-12-29 ENCOUNTER — OFFICE VISIT (OUTPATIENT)
Dept: INTERNAL MEDICINE CLINIC | Age: 76
End: 2021-12-29
Payer: MEDICARE

## 2021-12-29 VITALS
SYSTOLIC BLOOD PRESSURE: 164 MMHG | TEMPERATURE: 97.5 F | DIASTOLIC BLOOD PRESSURE: 73 MMHG | HEIGHT: 64 IN | BODY MASS INDEX: 27.66 KG/M2 | RESPIRATION RATE: 18 BRPM | OXYGEN SATURATION: 96 % | WEIGHT: 162 LBS | HEART RATE: 80 BPM

## 2021-12-29 DIAGNOSIS — K40.90 RIGHT INGUINAL HERNIA: Primary | ICD-10-CM

## 2021-12-29 PROCEDURE — G8399 PT W/DXA RESULTS DOCUMENT: HCPCS | Performed by: INTERNAL MEDICINE

## 2021-12-29 PROCEDURE — G8419 CALC BMI OUT NRM PARAM NOF/U: HCPCS | Performed by: INTERNAL MEDICINE

## 2021-12-29 PROCEDURE — G0463 HOSPITAL OUTPT CLINIC VISIT: HCPCS | Performed by: INTERNAL MEDICINE

## 2021-12-29 PROCEDURE — 99212 OFFICE O/P EST SF 10 MIN: CPT | Performed by: INTERNAL MEDICINE

## 2021-12-29 PROCEDURE — G8510 SCR DEP NEG, NO PLAN REQD: HCPCS | Performed by: INTERNAL MEDICINE

## 2021-12-29 PROCEDURE — 1101F PT FALLS ASSESS-DOCD LE1/YR: CPT | Performed by: INTERNAL MEDICINE

## 2021-12-29 PROCEDURE — G8427 DOCREV CUR MEDS BY ELIG CLIN: HCPCS | Performed by: INTERNAL MEDICINE

## 2021-12-29 PROCEDURE — 1090F PRES/ABSN URINE INCON ASSESS: CPT | Performed by: INTERNAL MEDICINE

## 2021-12-29 PROCEDURE — G8536 NO DOC ELDER MAL SCRN: HCPCS | Performed by: INTERNAL MEDICINE

## 2021-12-29 NOTE — PROGRESS NOTES
Mary Carroll is a 64 y.o. female who was seen in clinic today (12/29/2021) for an acute visit. Assessment & Plan:   Below is the assessment and plan developed based on review of pertinent history, physical exam, labs, studies, and medications. 1. Right inguinal hernia  Comments:  new dx to me, chronic issue, getting worse, symptomatic, will have her see surgeon to review repair, limit/avoid triggers, red flags reviewed w/ her  Orders:  -     REFERRAL TO GENERAL SURGERY    Follow-up and Dispositions    · Return if symptoms worsen or fail to improve. Subjective/Objective:   Evan Serrano was seen today for Hernia (Non Specific)    She reports having a hernia for the last 6 years. It has been more bothersome recently (getting more painful). It is in her R groin. She has been more active (walking) but no lifting or working out. Pain is intermittent, but having less pain free times then she has had. Worse w/ prolonged standing. No nausea, vomiting, diarrhea, or constipation. She has a h/o umbilical and L inguinal hernia repair. Chart has b/o bilateral inguinal hernia repair but she denies. Prior to Admission medications    Medication Sig Start Date End Date Taking? Authorizing Provider   cetirizine HCl (ZYRTEC PO) Take  by mouth. Yes Provider, Historical   vit A/vit C/vit E/zinc/copper (PRESERVISION AREDS PO) Take  by mouth. Yes Provider, Historical   folic acid (FOLVITE) 011 mcg tablet Take 800 mcg by mouth daily. Yes Provider, Historical   cyanocobalamin (Vitamin B-12) 500 mcg tablet Take 500 mcg by mouth daily. Yes Provider, Historical   minoxidiL (ROGAINE) 2 % external solution Apply  to affected area two (2) times a day. DROPS   Yes Provider, Historical   diclofenac (VOLTAREN) 1 % gel Apply  to affected area as needed for Pain. Yes Provider, Historical   diclofenac EC (VOLTAREN) 75 mg EC tablet Take 75 mg by mouth two (2) times daily as needed for Pain.    Yes Provider, Historical   acetaminophen (Tylenol Extra Strength) 500 mg tablet Take  by mouth every six (6) hours as needed for Pain. Yes Provider, Historical   Dilantin Extended 100 mg ER capsule TAKE 3 CAPSULES DAILY 3/15/21  Yes David Adams MD   cholecalciferol (VITAMIN D3) 1,000 unit tablet Take  by mouth daily. Yes Provider, Historical   omega-3 fatty acids-vitamin e (FISH OIL) 1,000 mg cap Take 1 Cap by mouth. Yes Provider, Historical   Biotin 2,500 mcg cap Take  by mouth daily. Yes Provider, Historical   loratadine (CLARITIN PO) Take 1 Tablet by mouth as needed. Patient not taking: Reported on 12/7/2021 12/29/21  Provider, Historical           Physical Exam  Abdominal:      Tenderness: There is no guarding or rebound. Hernia: A hernia is present. Hernia is present in the right inguinal area (4cm, reducible, tender). Visit Vitals  BP (!) 164/73   Pulse 80   Temp 97.5 °F (36.4 °C) (Temporal)   Resp 18   Ht 5' 4\" (1.626 m)   Wt 162 lb (73.5 kg)   SpO2 96%   BMI 27.81 kg/m²         Advised her to call back or return to office if symptoms worsen/change/persist.  Discussed expected course/resolution/complications of diagnosis in detail with patient. Medication risks/benefits/costs/interactions/alternatives discussed with patient.     Donell Rapp MD

## 2022-01-12 ENCOUNTER — OFFICE VISIT (OUTPATIENT)
Dept: SURGERY | Age: 77
End: 2022-01-12
Payer: MEDICARE

## 2022-01-12 VITALS
TEMPERATURE: 98.3 F | SYSTOLIC BLOOD PRESSURE: 135 MMHG | RESPIRATION RATE: 18 BRPM | WEIGHT: 160.8 LBS | DIASTOLIC BLOOD PRESSURE: 83 MMHG | OXYGEN SATURATION: 97 % | HEART RATE: 86 BPM | HEIGHT: 64 IN | BODY MASS INDEX: 27.45 KG/M2

## 2022-01-12 DIAGNOSIS — K40.90 RIGHT INGUINAL HERNIA: Primary | ICD-10-CM

## 2022-01-12 PROCEDURE — G8536 NO DOC ELDER MAL SCRN: HCPCS | Performed by: SURGERY

## 2022-01-12 PROCEDURE — G8510 SCR DEP NEG, NO PLAN REQD: HCPCS | Performed by: SURGERY

## 2022-01-12 PROCEDURE — 1101F PT FALLS ASSESS-DOCD LE1/YR: CPT | Performed by: SURGERY

## 2022-01-12 PROCEDURE — G8419 CALC BMI OUT NRM PARAM NOF/U: HCPCS | Performed by: SURGERY

## 2022-01-12 PROCEDURE — 1090F PRES/ABSN URINE INCON ASSESS: CPT | Performed by: SURGERY

## 2022-01-12 PROCEDURE — 99203 OFFICE O/P NEW LOW 30 MIN: CPT | Performed by: SURGERY

## 2022-01-12 PROCEDURE — G8399 PT W/DXA RESULTS DOCUMENT: HCPCS | Performed by: SURGERY

## 2022-01-12 PROCEDURE — G8427 DOCREV CUR MEDS BY ELIG CLIN: HCPCS | Performed by: SURGERY

## 2022-01-12 NOTE — LETTER
1/12/2022    Patient: Jourdan Camara   YOB: 1945   Date of Visit: 1/12/2022     Alexa Carter MD  [address] [facility]  [address]  [facility] [phone]  [address]  [address] [address] Pending sale to [facility] [phone]  [address] [facility] [address]    Dear MD Kandy Nam MD,      Thank you for referring Ms. Trent Many to [address] for evaluation. My notes for this consultation are attached. If you have questions, please do not hesitate to call me. I look forward to following your patient along with you.       Sincerely,    Alonzo Ochoa MD

## 2022-01-12 NOTE — PROGRESS NOTES
1. Have you been to the ER, urgent care clinic since your last visit? Hospitalized since your last visit? No    2. Have you seen or consulted any other health care providers outside of the [address] [place] [address] since your last visit? Include any pap smears or colon screening.  No

## 2022-01-12 NOTE — PROGRESS NOTES
[facility] at [facility] Surgery History and Physical    History of Present Illness:      Ban Luther is a 64 y.o. female who has a right inguinal hernia. She has a previous history of a open left inguinal hernia repair and open umbilical hernia repair. She also has a history of laparoscopic cholecystectomy. She has noticed she has had a bulge in the right groin with some associated pain. The pain is a 2-3 out of 10 with standing and bending over. She has been having normal bowel movements. The hernia bulge seems to reduce itself with relaxation.     Past Medical History:   Diagnosis Date    Arrhythmia 1990's    more frequently this year    Asthma     Bronchitis     Cancer ([address]     Skin cancer    Cerebral meningioma ([address] resection 1992    Cytomegalovirus ([address]     Nohemi-Danlos syndrome     GERD (gastroesophageal reflux disease)     Osteoarthritis     Other ill-defined conditions(799.89) 1992    brain tumor-benign    Pericarditis, acute     Thromboembolus ([address] 1975    PE x2       Past Surgical History:   Procedure Laterality Date    HX ADENOIDECTOMY      HX CHOLECYSTECTOMY      HX ENDOSCOPY  1996    HX GI      COLONOSCOPY    HX GYN  1973    ectopic pregnancy    HX GYN  1996 2003    VULVECTOMY    HX GYN  10/22/2021    precancerous area removed from vulva    HX HEENT  1992    removed brain tumor and plate inserted    HX HERNIA REPAIR      bilateral    HX ORTHOPAEDIC      bilateral knee surgery     HX ORTHOPAEDIC      BONE TAKEN OUT OF WRIST    HX OTHER SURGICAL      SKIN CA REMOVED FACE AND HEAD    HX TONSILLECTOMY      HX UROLOGICAL  2005    SLING    NEUROLOGICAL PROCEDURE UNLISTED  1992    meningioma    LA COLONOSCOPY FLX DX W/COLLJ SPEC WHEN PFRMD  1/13/2011         LA EXCIS INFRATENT MENINGIOMA      LA FOREARM/WRIST SURGERY UNLISTED  2010    Right    LA TREAT ECTOPIC PREG,ABD PREG           Current Outpatient Medications:     vit A/vit C/vit E/zinc/copper (PRESERVISION AREDS PO), Take  by mouth., Disp: , Rfl:     folic acid (FOLVITE) 705 mcg tablet, Take 800 mcg by mouth daily. , Disp: , Rfl:     cyanocobalamin (Vitamin B-12) 500 mcg tablet, Take 500 mcg by mouth daily. , Disp: , Rfl:     minoxidiL (ROGAINE) 2 % external solution, Apply  to affected area two (2) times a day. DROPS, Disp: , Rfl:     Dilantin Extended 100 mg ER capsule, TAKE 3 CAPSULES DAILY, Disp: 270 Cap, Rfl: 3    cholecalciferol (VITAMIN D3) 1,000 unit tablet, Take  by mouth daily. , Disp: , Rfl:     omega-3 fatty acids-vitamin e (FISH OIL) 1,000 mg cap, Take 1 Cap by mouth.  , Disp: , Rfl:     Biotin 2,500 mcg cap, Take  by mouth daily. , Disp: , Rfl:     cetirizine HCl (ZYRTEC PO), Take  by mouth. (Patient not taking: Reported on 1/12/2022), Disp: , Rfl:     diclofenac (VOLTAREN) 1 % gel, Apply  to affected area as needed for Pain. (Patient not taking: Reported on 1/12/2022), Disp: , Rfl:     diclofenac EC (VOLTAREN) 75 mg EC tablet, Take 75 mg by mouth two (2) times daily as needed for Pain. (Patient not taking: Reported on 1/12/2022), Disp: , Rfl:     acetaminophen (Tylenol Extra Strength) 500 mg tablet, Take  by mouth every six (6) hours as needed for Pain. (Patient not taking: Reported on 1/12/2022), Disp: , Rfl:     Allergies   Allergen Reactions    Epinephrine Unknown (comments)    Sulfa (Sulfonamide Antibiotics) Other (comments)     Kidneys shut down    Ephedrine Palpitations    Nsaids (Non-Steroidal Anti-Inflammatory Drug) Diarrhea     Indigestion, nausea    Advil [Ibuprofen] Other (comments)     Fluid retention.  Ampicillin Unknown (comments)     Pt is unsure if she is still allergic to [facility], was told to stay away b/c of repeated use as a child.     Codeine Unknown (comments)    Demerol [Meperidine] Unknown (comments)    Dilaudid [Hydromorphone (Bulk)] Unknown (comments)    Erythromycin Unknown (comments)    Levaquin [Levofloxacin] Other (comments) GI upset    Lidocaine Shortness of Breath     Pt is unsure if she is allergic to lidocaine, pt reports she thinks she received lidocaine w/ epinephrine and had a reaction.  Ciprofloxacin Nausea and Vomiting       Social History     Socioeconomic History    Marital status:      Spouse name: Not on file    Number of children: Not on file    Years of education: Not on file    Highest education level: Not on file   Occupational History    Not on file   Tobacco Use    Smoking status: Former Smoker     Packs/day: 0.00     Years: 10.00     Pack years: 0.00     Types: Cigarettes     Quit date: 1973     Years since quittin.2    Smokeless tobacco: Never Used    Tobacco comment: Stopped in    Vaping Use    Vaping Use: Never used   Substance and Sexual Activity    Alcohol use: No     Alcohol/week: 0.0 standard drinks    Drug use: No    Sexual activity: Yes     Partners: Male     Birth control/protection: None     Comment: Too old to worry   Other Topics Concern    Not on file   Social History Narrative    Not on file     Social Determinants of Health     Financial Resource Strain:     Difficulty of Paying Living Expenses: Not on file   Food Insecurity:     Worried About Running Out of Food in the Last Year: Not on file    [place] of Food in the Last Year: Not on file   Transportation Needs:     Lack of Transportation (Medical): Not on file    Lack of Transportation (Non-Medical):  Not on file   Physical Activity:     Days of Exercise per Week: Not on file    Minutes of Exercise per Session: Not on file   Stress:     Feeling of Stress : Not on file   Social Connections:     Frequency of Communication with Friends and Family: Not on file    Frequency of Social Gatherings with Friends and Family: Not on file    Attends Christian Services: Not on file    Active Member of Clubs or Organizations: Not on file    Attends Club or Organization Meetings: Not on file    Marital Status: Not on file   Intimate Partner Violence:     Fear of Current or Ex-Partner: Not on file    Emotionally Abused: Not on file    Physically Abused: Not on file    Sexually Abused: Not on file   Housing Stability:     Unable to Pay for Housing in the Last Year: Not on file    Number of [place] in the Last Year: Not on file    Unstable Housing in the Last Year: Not on file       Family History   Problem Relation Age of Onset    Heart Attack Father     Other Father         Gi bleed    Heart Disease Father         MI  74yo   [de-identified] Alcohol abuse Father     OSTEOARTHRITIS Father         gout    Other Mother         Pneumonia    Heart Disease Mother         CHF/pacemaker    OSTEOARTHRITIS Mother     Asthma Mother    [place] Bladder Disease Mother     Hypertension Mother     Breast Cancer Maternal Aunt 79    OSTEOARTHRITIS Paternal Grandfather         Rheumatoid       ROS   Constitutional: negative  Ears, Nose, Mouth, Throat, and Face: negative  Respiratory: negative  Cardiovascular: negative  Gastrointestinal: positive for Right groin bulge and pain  Genitourinary:negative  Integument/Breast: negative  Hematologic/Lymphatic: negative  Behavioral/Psychiatric: negative  Allergic/Immunologic: negative      Physical Exam:     Visit Vitals  /83 (BP 1 Location: Left arm, BP Patient Position: Sitting, BP Cuff Size: Adult)   Pulse 86   Temp 98.3 °F (36.8 °C) (Oral)   Resp 18   Ht 5' 4\" (1.626 m)   Wt 160 lb 12.8 oz (72.9 kg)   SpO2 97%   BMI 27.60 kg/m²       General - alert and oriented, no apparent distress  HEENT - no jaundice, no hearing imparement  Pulm - CTAB, no C/W/R  CV - RRR, no M/R/G  Abd -soft, nondistended, bowel sounds present, nontender to palpation, right inguinal hernia that is soft and reducible, small to medium size  Ext - pulses intact in UE and LE bilaterally, no edema  Skin - supple, no rashes  Psychiatric - normal affect, good mood    Labs  Lab Results   Component Value Date/Time Sodium 141 10/19/2021 11:04 AM    Potassium 4.0 10/19/2021 11:04 AM    Chloride 108 10/19/2021 11:04 AM    CO2 29 10/19/2021 11:04 AM    Anion gap 4 (L) 10/19/2021 11:04 AM    Glucose 96 10/19/2021 11:04 AM    BUN 13 10/19/2021 11:04 AM    Creatinine 0.49 (L) 10/19/2021 11:04 AM    BUN/Creatinine ratio 27 (H) 10/19/2021 11:04 AM    GFR est AA >60 10/19/2021 11:04 AM    GFR est non-AA >60 10/19/2021 11:04 AM    Calcium 9.3 10/19/2021 11:04 AM    Bilirubin, total 0.3 10/19/2021 11:04 AM    Alk. phosphatase 171 (H) 10/19/2021 11:04 AM    Protein, total 7.1 10/19/2021 11:04 AM    Albumin 3.5 10/19/2021 11:04 AM    Globulin 3.6 10/19/2021 11:04 AM    A-G Ratio 1.0 (L) 10/19/2021 11:04 AM    ALT (SGPT) 20 10/19/2021 11:04 AM    AST (SGOT) 20 10/19/2021 11:04 AM     Lab Results   Component Value Date/Time    WBC 5.9 10/19/2021 11:04 AM    HGB 13.4 10/19/2021 11:04 AM    HCT 40.6 10/19/2021 11:04 AM    PLATELET 739 (L) 23/44/7093 11:04 AM    MCV 94.6 10/19/2021 11:04 AM         Imaging  none  I have reviewed and agree with all of the pertinent images    Assessment:     Radha Arteaga is a 64 y.o. female with right inguinal hernia     Recommendations:     1. She does have a small to medium size right inguinal hernia and will need robotic right inguinal hernia repair with mesh in the OR. I have discussed the above procedure with the patient in detail. We reviewed the benefits and possible complications of the surgery which include bleeding, infection, damage to adjacent organs, venous thromboembolism, need for repeat surgery, death and other unforseen complications. The patient agreed to proceed with the surgery. Shaun Delgado MD    Greater than half of the time: 30 minutes was used in counciling the patient about diagnosis and treatment plan    Ms. Laquita Leon has a reminder for a \"due or due soon\" health maintenance. I have asked that she contact her primary care provider for follow-up on this health maintenance.

## 2022-01-24 ENCOUNTER — PATIENT MESSAGE (OUTPATIENT)
Dept: SURGERY | Age: 77
End: 2022-01-24

## 2022-02-04 ENCOUNTER — PATIENT MESSAGE (OUTPATIENT)
Dept: SURGERY | Age: 77
End: 2022-02-04

## 2022-02-05 ENCOUNTER — TRANSCRIBE ORDER (OUTPATIENT)
Dept: REGISTRATION | Age: 77
End: 2022-02-05

## 2022-02-05 DIAGNOSIS — Z01.812 PRE-PROCEDURE LAB EXAM: Primary | ICD-10-CM

## 2022-02-08 DIAGNOSIS — G40.909 SEIZURE DISORDER (HCC): ICD-10-CM

## 2022-02-08 RX ORDER — PHENYTOIN SODIUM 100 MG/1
CAPSULE, EXTENDED RELEASE ORAL
Qty: 270 CAPSULE | Refills: 3 | Status: SHIPPED | OUTPATIENT
Start: 2022-02-08 | End: 2022-05-20 | Stop reason: SDUPTHER

## 2022-02-10 ENCOUNTER — HOSPITAL ENCOUNTER (OUTPATIENT)
Dept: PREADMISSION TESTING | Age: 77
Discharge: HOME | End: 2022-02-10
Payer: MEDICARE

## 2022-02-10 VITALS
HEIGHT: 64 IN | SYSTOLIC BLOOD PRESSURE: 147 MMHG | DIASTOLIC BLOOD PRESSURE: 76 MMHG | TEMPERATURE: 98 F | BODY MASS INDEX: 27.78 KG/M2 | RESPIRATION RATE: 16 BRPM | HEART RATE: 71 BPM | WEIGHT: 162.7 LBS

## 2022-02-10 LAB
BASOPHILS # BLD: 0 K/UL (ref 0–0.1)
BASOPHILS NFR BLD: 0 % (ref 0–1)
DIFFERENTIAL METHOD BLD: ABNORMAL
EOSINOPHIL # BLD: 0.1 K/UL (ref 0–0.4)
EOSINOPHIL NFR BLD: 2 % (ref 0–7)
ERYTHROCYTE [DISTWIDTH] IN BLOOD BY AUTOMATED COUNT: 12.8 % (ref 11.5–14.5)
HCT VFR BLD AUTO: 39.1 % (ref 35–47)
HGB BLD-MCNC: 12.7 G/DL (ref 11.5–16)
IMM GRANULOCYTES # BLD AUTO: 0 K/UL (ref 0–0.04)
IMM GRANULOCYTES NFR BLD AUTO: 0 % (ref 0–0.5)
LYMPHOCYTES # BLD: 1.5 K/UL (ref 0.8–3.5)
LYMPHOCYTES NFR BLD: 33 % (ref 12–49)
MCH RBC QN AUTO: 30.2 PG (ref 26–34)
MCHC RBC AUTO-ENTMCNC: 32.5 G/DL (ref 30–36.5)
MCV RBC AUTO: 93.1 FL (ref 80–99)
MONOCYTES # BLD: 0.5 K/UL (ref 0–1)
MONOCYTES NFR BLD: 12 % (ref 5–13)
NEUTS SEG # BLD: 2.3 K/UL (ref 1.8–8)
NEUTS SEG NFR BLD: 53 % (ref 32–75)
NRBC # BLD: 0 K/UL (ref 0–0.01)
NRBC BLD-RTO: 0 PER 100 WBC
PLATELET # BLD AUTO: 118 K/UL (ref 150–400)
PMV BLD AUTO: 9.2 FL (ref 8.9–12.9)
RBC # BLD AUTO: 4.2 M/UL (ref 3.8–5.2)
WBC # BLD AUTO: 4.5 K/UL (ref 3.6–11)

## 2022-02-10 PROCEDURE — 85025 COMPLETE CBC W/AUTO DIFF WBC: CPT

## 2022-02-10 RX ORDER — CETIRIZINE HCL 10 MG
10 TABLET ORAL
COMMUNITY
End: 2022-05-20 | Stop reason: ALTCHOICE

## 2022-02-10 NOTE — PERIOP NOTES
Reported Dilantin level 5.1 done at PCP 5/21/21 pt stated PCP is ok with level. Reported Dilantin level to Norma Mcclure NP. No need to repeat Dilantin level per Mamta Edge.

## 2022-02-10 NOTE — PERIOP NOTES
[address] INSTRUCTIONS    Surgery Date:   2/18/22    [facility] staff will call you between 4 PM- 8 PM the day before surgery with your arrival time. If your surgery is on a Monday, we will call you the preceding Friday. Please call [phone] after 8 PM if you did not receive your arrival time. 1. Please report to [facility] Patient Access/Admitting on the 1st floor. Bring your insurance card, photo identification, and any copayment ( if applicable). 2. If you are going home the same day of your surgery, you must have a responsible adult to drive you home. You need to have a responsible adult to stay with you the first 24 hours after surgery and you should not drive a car for 24 hours following your surgery. 3. Nothing to eat or drink after midnight the night before surgery. This includes no water, gum, mints, coffee, juice, etc.  Please note special instructions, if applicable, below for medications. 4. Do NOT drink alcohol or smoke 24 hours before surgery. STOP smoking for 14 days prior as it helps with breathing and healing after surgery. 5. If you are being admitted to the hospital, please leave personal belongings/luggage in your car until you have an assigned hospital room number. 6. Please wear comfortable clothes. Wear your glasses instead of contacts. We ask that all money, jewelry and valuables be left at home. Wear no make up, particularly mascara, the day of surgery. 7.  All body piercings, rings, and jewelry need to be removed and left at home. Please remove any nail polish or artificial nails from your fingernails. Please wear your hair loose or down. Please no pony-tails, buns, or any metal hair accessories. If you shower the morning of surgery, please do not apply any lotions or powders afterwards. You may wear deodorant, unless having breast surgery. Do not shave any body area within 24 hours of your surgery.   8. Please follow all instructions to avoid any potential surgical cancellation. 9. Should your physical condition change, (i.e. fever, cold, flu, etc.) please notify your surgeon as soon as possible. 10. It is important to be on time. If a situation occurs where you may be delayed, please call:  [phone] / [phone] on the day of surgery. 11. The Preadmission Testing staff can be reached at [phone]. 12. Special instructions: NONE      Current Outpatient Medications   Medication Sig    cetirizine (ZYRTEC) 10 mg tablet Take 10 mg by mouth nightly as needed for Allergies.  Dilantin Extended 100 mg ER capsule TAKE 3 CAPSULES DAILY    vit A/vit C/vit E/zinc/copper (PRESERVISION AREDS PO) Take 2 Tablets by mouth daily.  folic acid (FOLVITE) 437 mcg tablet Take 800 mcg by mouth daily.  cyanocobalamin (Vitamin B-12) 500 mcg tablet Take 500 mcg by mouth daily.  minoxidiL (ROGAINE) 2 % external solution Apply  to affected area two (2) times a day. DROPS    acetaminophen (Tylenol Extra Strength) 500 mg tablet Take  by mouth every six (6) hours as needed for Pain.  cholecalciferol (VITAMIN D3) 1,000 unit tablet Take  by mouth daily.  omega-3 fatty acids-vitamin e (FISH OIL) 1,000 mg cap Take 1 Capsule by mouth daily.  Biotin 2,500 mcg cap Take  by mouth daily.  diclofenac (VOLTAREN) 1 % gel Apply  to affected area as needed for Pain. (Patient not taking: Reported on 1/12/2022)    diclofenac EC (VOLTAREN) 75 mg EC tablet Take 75 mg by mouth two (2) times daily as needed for Pain. (Patient not taking: Reported on 1/12/2022)     No current facility-administered medications for this encounter. 1. YOU MUST ONLY TAKE THESE MEDICATIONS THE MORNING OF SURGERY WITH A SIP OF WATER: DILANTIN  2. MEDICATIONS TO TAKE THE MORNING OF SURGERY ONLY IF NEEDED: NONE  3. HOLD these prescription medications BEFORE Surgery: NONE  4. Ask your surgeon/prescribing physician about when/if to STOP taking these medications: NONE  5.  Stop all vitamins, herbal medicines and Aspirin containing products 7 days prior to surgery. Stop any non-steroidal anti-inflammatory drugs (i.e. Ibuprofen, Naproxen, Advil, Aleve) 3 days before surgery. You may take Tylenol. 6. If you are currently taking Plavix, Coumadin, or any other blood-thinning/anticoagulant medication contact your prescribing physician for instructions. Preventing Infections Before and After - Your Surgery    IMPORTANT INSTRUCTIONS    Please read and follow these instructions carefully. If you are unable to comply with the below instructions your procedure will be cancelled. You play an important role in your health and preparation for surgery. To reduce the germs on your skin you will need to shower with CHG soap (Chorhexidine gluconate 4%) two times before surgery. CHG soap (Hibiclens, Hex-A-Clens or store brand)   CHG soap will be provided at your Preadmission Testing (PAT) appointment.  If you do not have a PAT appointment before surgery, you may arrange to  CHG soap from our office or purchase CHG soap at a pharmacy, grocery or department store.  You need to purchase TWO 4 ounce bottles to use for your 2 showers. Steps to follow:  1. Wash your hair with your normal shampoo and your body with regular soap and rinse well to remove shampoo and soap from your skin. 2. Wet a clean washcloth and turn off the shower. 3. Put CHG soap on washcloth and apply to your entire body from the neck down. Do not use on your head, face or private parts(genitals). Do not use CHG soap on open sores, wounds or areas of skin irritation. 4. Wash you body gently for 5 minutes. Do not wash your skin too hard. This soap does not create lather. Pay special attention to your underarms and from your belly button to your feet. 5. Turn the shower back on and rinse well to get CHG soap off your body. 6. Pat your skin dry with a clean, dry towel. Do not apply lotions or moisturizer.   7. Put on clean clothes and sleep on fresh bed sheets and do not allow pets to sleep with you. Shower with CHG soap 2 times before your surgery   The evening before your surgery   The morning of your surgery      Tips to help prevent infections after your surgery:  1. Protect your surgical wound from germs:  ? Hand washing is the most important thing you and your caregivers can do to prevent infections. ? Keep your bandage clean and dry! ? Do not touch your surgical wound. 2. Use clean, freshly washed towels and washcloths every time you shower; do not share bath linens with others. 3. Until your surgical wound is healed, wear clothing and sleep on bed linens each day that are clean and freshly washed. 4. Do not allow pets to sleep in your bed with you or touch your surgical wound. 5. Do not smoke - smoking delays wound healing. This may be a good time to stop smoking. 6. If you have diabetes, it is important for you to manage your blood sugar levels properly before your surgery as well as after your surgery. Poorly managed blood sugar levels slow down wound healing and prevent you from healing completely. [facility]   Instructions for Pre-Surgery COVID-19 Testing 2/15/22 AT 12:30PM    Across our ministry we have established standard guidelines to ensure the health and safety of our patients, residents and associates as we resume elective services for patients. All patients presenting for surgery are required to have a COVID-19 test result within 96 hours of their scheduled surgery. [facility] is providing this test free of charge to the patient.    Instructions for COVID-19 Testing:     Patients will receive a call from Pre-Admission Testing 4-5 days prior to surgery to schedule a date and time to come to the [address] [place] [address] Drive for their COVID-19 test   Patients are advised to self-quarantine after testing until their scheduled surgery   Once on site, patients will be registered and receive COVID test in their vehicle   If a patient is scheduled for normal Pre Admission Testing 96 hours from date of surgery, the patient will still have their COVID test done at the [address] [place] [address] located at [address] [place] [address] Positive results will be shared with the surgeon and anesthesiologist and may result in cancellation of the elective procedure    Testing Hours and Location:   Address:  [address] [place] [address] Rd Admission 11 [facility] in the Discharge Lot on [facility] (Map Attached)  Ceferino Schreiber, [address]   Hours: Monday- Friday 7a-3p    PAT Phone Number: [phone]            Patient Information Regarding COVID Restrictions    Patients are advised to self-quarantine after COVID testing up to the day of the scheduled procedure. Day of Procedure     Please park in the parking deck or any designated visitor parking lot.  Enter the facility through the Main Entrance of the hospital.   A temperature check and appropriate symptom/exposure screening will be done prior to entry to the facility.  On the day of surgery, please provide the cell phone number of the person who will be waiting for you to the Patient Access representative at the time of registration.  Please wear a mask on the day of your procedure.  We are now allowing one designated visitor per stay. Pediatric patients may have 2 designated visitors. This one person may come in with you on the day of your procedure.  No visitors under the age of 13.  The designated visitor must also wear a mask.  Once your procedure and the immediate recovery period is completed, a nurse in the recovery area will contact your designated visitor to inform them of your room number or to otherwise review other pertinent information regarding your care.  Social distancing practices are to be adhered to in waiting areas and the cafeteria.        The patient was contacted  in person. She verbalized understanding of all instructions does not  need reinforcement.

## 2022-02-15 ENCOUNTER — HOSPITAL ENCOUNTER (OUTPATIENT)
Dept: PREADMISSION TESTING | Age: 77
Discharge: HOME | End: 2022-02-15
Attending: SURGERY
Payer: MEDICARE

## 2022-02-15 DIAGNOSIS — Z01.812 PRE-PROCEDURE LAB EXAM: ICD-10-CM

## 2022-02-15 PROCEDURE — U0005 INFEC AGEN DETEC AMPLI PROBE: HCPCS

## 2022-02-16 LAB
SARS-COV-2, XPLCVT: NOT DETECTED
SOURCE, COVRS: NORMAL

## 2022-02-17 ENCOUNTER — ANESTHESIA EVENT (OUTPATIENT)
Dept: SURGERY | Age: 77
End: 2022-02-17
Payer: MEDICARE

## 2022-02-18 ENCOUNTER — HOSPITAL ENCOUNTER (OUTPATIENT)
Age: 77
Setting detail: OUTPATIENT SURGERY
Discharge: HOME | End: 2022-02-18
Attending: SURGERY | Admitting: SURGERY
Payer: MEDICARE

## 2022-02-18 ENCOUNTER — ANESTHESIA (OUTPATIENT)
Dept: SURGERY | Age: 77
End: 2022-02-18
Payer: MEDICARE

## 2022-02-18 VITALS
TEMPERATURE: 97.8 F | RESPIRATION RATE: 14 BRPM | DIASTOLIC BLOOD PRESSURE: 58 MMHG | SYSTOLIC BLOOD PRESSURE: 125 MMHG | HEART RATE: 66 BPM | OXYGEN SATURATION: 96 %

## 2022-02-18 DIAGNOSIS — K40.90 RIGHT INGUINAL HERNIA: Primary | ICD-10-CM

## 2022-02-18 PROCEDURE — 74011250636 HC RX REV CODE- 250/636: Performed by: NURSE ANESTHETIST, CERTIFIED REGISTERED

## 2022-02-18 PROCEDURE — 74011250636 HC RX REV CODE- 250/636: Performed by: ANESTHESIOLOGY

## 2022-02-18 PROCEDURE — 74011000250 HC RX REV CODE- 250: Performed by: SURGERY

## 2022-02-18 PROCEDURE — 77030035277 HC OBTRTR BLDELSS DISP INTU -B: Performed by: SURGERY

## 2022-02-18 PROCEDURE — 77030008684 HC TU ET CUF COVD -B: Performed by: ANESTHESIOLOGY

## 2022-02-18 PROCEDURE — 76210000020 HC REC RM PH II FIRST 0.5 HR: Performed by: SURGERY

## 2022-02-18 PROCEDURE — 74011250636 HC RX REV CODE- 250/636: Performed by: SURGERY

## 2022-02-18 PROCEDURE — 49650 LAP ING HERNIA REPAIR INIT: CPT | Performed by: SURGERY

## 2022-02-18 PROCEDURE — 74011000250 HC RX REV CODE- 250: Performed by: NURSE ANESTHETIST, CERTIFIED REGISTERED

## 2022-02-18 PROCEDURE — 77030022704 HC SUT VLOC COVD -B: Performed by: SURGERY

## 2022-02-18 PROCEDURE — 77030031139 HC SUT VCRL2 J&J -A: Performed by: SURGERY

## 2022-02-18 PROCEDURE — 74011000250 HC RX REV CODE- 250: Performed by: ANESTHESIOLOGY

## 2022-02-18 PROCEDURE — 77030040922 HC BLNKT HYPOTHRM STRY -A

## 2022-02-18 PROCEDURE — 76060000034 HC ANESTHESIA 1.5 TO 2 HR: Performed by: SURGERY

## 2022-02-18 PROCEDURE — 77030016151 HC PROTCTR LNS DFOG COVD -B: Performed by: SURGERY

## 2022-02-18 PROCEDURE — 77030020703 HC SEAL CANN DISP INTU -B: Performed by: SURGERY

## 2022-02-18 PROCEDURE — 74011250637 HC RX REV CODE- 250/637: Performed by: ANESTHESIOLOGY

## 2022-02-18 PROCEDURE — 76210000000 HC OR PH I REC 2 TO 2.5 HR: Performed by: SURGERY

## 2022-02-18 PROCEDURE — C1781 MESH (IMPLANTABLE): HCPCS | Performed by: SURGERY

## 2022-02-18 PROCEDURE — 77030002933 HC SUT MCRYL J&J -A: Performed by: SURGERY

## 2022-02-18 PROCEDURE — S2900 ROBOTIC SURGICAL SYSTEM: HCPCS | Performed by: SURGERY

## 2022-02-18 PROCEDURE — 77030010507 HC ADH SKN DERMBND J&J -B: Performed by: SURGERY

## 2022-02-18 PROCEDURE — 2709999900 HC NON-CHARGEABLE SUPPLY: Performed by: SURGERY

## 2022-02-18 PROCEDURE — 76010000875 HC OR TIME 1.5 TO 2HR INTENSV - TIER 2: Performed by: SURGERY

## 2022-02-18 PROCEDURE — 77030002912 HC SUT ETHBND J&J -A: Performed by: SURGERY

## 2022-02-18 PROCEDURE — 77030040361 HC SLV COMPR DVT MDII -B: Performed by: SURGERY

## 2022-02-18 DEVICE — MESH SLF-FLT PROGRIP RT -- PROGRIP: Type: IMPLANTABLE DEVICE | Site: INGUINAL | Status: FUNCTIONAL

## 2022-02-18 RX ORDER — SODIUM CHLORIDE 0.9 % (FLUSH) 0.9 %
5-40 SYRINGE (ML) INJECTION AS NEEDED
Status: DISCONTINUED | OUTPATIENT
Start: 2022-02-18 | End: 2022-02-18 | Stop reason: HOSPADM

## 2022-02-18 RX ORDER — PROPOFOL 10 MG/ML
INJECTION, EMULSION INTRAVENOUS AS NEEDED
Status: DISCONTINUED | OUTPATIENT
Start: 2022-02-18 | End: 2022-02-18 | Stop reason: HOSPADM

## 2022-02-18 RX ORDER — OXYCODONE AND ACETAMINOPHEN 5; 325 MG/1; MG/1
1 TABLET ORAL
Qty: 30 TABLET | Refills: 0 | Status: SHIPPED | OUTPATIENT
Start: 2022-02-18 | End: 2022-02-25

## 2022-02-18 RX ORDER — DEXAMETHASONE SODIUM PHOSPHATE 4 MG/ML
INJECTION, SOLUTION INTRA-ARTICULAR; INTRALESIONAL; INTRAMUSCULAR; INTRAVENOUS; SOFT TISSUE AS NEEDED
Status: DISCONTINUED | OUTPATIENT
Start: 2022-02-18 | End: 2022-02-18 | Stop reason: HOSPADM

## 2022-02-18 RX ORDER — ONDANSETRON 2 MG/ML
INJECTION INTRAMUSCULAR; INTRAVENOUS AS NEEDED
Status: DISCONTINUED | OUTPATIENT
Start: 2022-02-18 | End: 2022-02-18 | Stop reason: HOSPADM

## 2022-02-18 RX ORDER — BUPIVACAINE HYDROCHLORIDE AND EPINEPHRINE 5; 5 MG/ML; UG/ML
30 INJECTION, SOLUTION EPIDURAL; INTRACAUDAL; PERINEURAL ONCE
Status: COMPLETED | OUTPATIENT
Start: 2022-02-18 | End: 2022-02-18

## 2022-02-18 RX ORDER — SUCCINYLCHOLINE CHLORIDE 20 MG/ML
INJECTION INTRAMUSCULAR; INTRAVENOUS AS NEEDED
Status: DISCONTINUED | OUTPATIENT
Start: 2022-02-18 | End: 2022-02-18 | Stop reason: HOSPADM

## 2022-02-18 RX ORDER — FENTANYL CITRATE 50 UG/ML
50 INJECTION, SOLUTION INTRAMUSCULAR; INTRAVENOUS AS NEEDED
Status: DISCONTINUED | OUTPATIENT
Start: 2022-02-18 | End: 2022-02-18 | Stop reason: HOSPADM

## 2022-02-18 RX ORDER — MIDAZOLAM HYDROCHLORIDE 1 MG/ML
1 INJECTION, SOLUTION INTRAMUSCULAR; INTRAVENOUS AS NEEDED
Status: DISCONTINUED | OUTPATIENT
Start: 2022-02-18 | End: 2022-02-18 | Stop reason: HOSPADM

## 2022-02-18 RX ORDER — HYDROMORPHONE HYDROCHLORIDE 1 MG/ML
0.2 INJECTION, SOLUTION INTRAMUSCULAR; INTRAVENOUS; SUBCUTANEOUS
Status: DISCONTINUED | OUTPATIENT
Start: 2022-02-18 | End: 2022-02-18 | Stop reason: HOSPADM

## 2022-02-18 RX ORDER — LIDOCAINE HYDROCHLORIDE 10 MG/ML
0.1 INJECTION, SOLUTION EPIDURAL; INFILTRATION; INTRACAUDAL; PERINEURAL AS NEEDED
Status: DISCONTINUED | OUTPATIENT
Start: 2022-02-18 | End: 2022-02-18 | Stop reason: HOSPADM

## 2022-02-18 RX ORDER — FENTANYL CITRATE 50 UG/ML
25 INJECTION, SOLUTION INTRAMUSCULAR; INTRAVENOUS
Status: DISCONTINUED | OUTPATIENT
Start: 2022-02-18 | End: 2022-02-18 | Stop reason: HOSPADM

## 2022-02-18 RX ORDER — LIDOCAINE HYDROCHLORIDE 20 MG/ML
INJECTION, SOLUTION EPIDURAL; INFILTRATION; INTRACAUDAL; PERINEURAL AS NEEDED
Status: DISCONTINUED | OUTPATIENT
Start: 2022-02-18 | End: 2022-02-18 | Stop reason: HOSPADM

## 2022-02-18 RX ORDER — GLYCOPYRROLATE 0.2 MG/ML
INJECTION INTRAMUSCULAR; INTRAVENOUS AS NEEDED
Status: DISCONTINUED | OUTPATIENT
Start: 2022-02-18 | End: 2022-02-18 | Stop reason: HOSPADM

## 2022-02-18 RX ORDER — SODIUM CHLORIDE, SODIUM LACTATE, POTASSIUM CHLORIDE, CALCIUM CHLORIDE 600; 310; 30; 20 MG/100ML; MG/100ML; MG/100ML; MG/100ML
50 INJECTION, SOLUTION INTRAVENOUS CONTINUOUS
Status: DISCONTINUED | OUTPATIENT
Start: 2022-02-18 | End: 2022-02-18 | Stop reason: HOSPADM

## 2022-02-18 RX ORDER — FENTANYL CITRATE 50 UG/ML
INJECTION, SOLUTION INTRAMUSCULAR; INTRAVENOUS AS NEEDED
Status: DISCONTINUED | OUTPATIENT
Start: 2022-02-18 | End: 2022-02-18 | Stop reason: HOSPADM

## 2022-02-18 RX ORDER — SODIUM CHLORIDE 9 MG/ML
INJECTION INTRAMUSCULAR; INTRAVENOUS; SUBCUTANEOUS
Status: DISCONTINUED
Start: 2022-02-18 | End: 2022-02-18 | Stop reason: HOSPADM

## 2022-02-18 RX ORDER — PROCHLORPERAZINE EDISYLATE 5 MG/ML
INJECTION INTRAMUSCULAR; INTRAVENOUS
Status: DISCONTINUED
Start: 2022-02-18 | End: 2022-02-18 | Stop reason: HOSPADM

## 2022-02-18 RX ORDER — SODIUM CHLORIDE 0.9 % (FLUSH) 0.9 %
5-40 SYRINGE (ML) INJECTION EVERY 8 HOURS
Status: DISCONTINUED | OUTPATIENT
Start: 2022-02-18 | End: 2022-02-18 | Stop reason: HOSPADM

## 2022-02-18 RX ORDER — ROCURONIUM BROMIDE 10 MG/ML
INJECTION, SOLUTION INTRAVENOUS AS NEEDED
Status: DISCONTINUED | OUTPATIENT
Start: 2022-02-18 | End: 2022-02-18 | Stop reason: HOSPADM

## 2022-02-18 RX ORDER — NEOSTIGMINE METHYLSULFATE 1 MG/ML
INJECTION, SOLUTION INTRAVENOUS AS NEEDED
Status: DISCONTINUED | OUTPATIENT
Start: 2022-02-18 | End: 2022-02-18 | Stop reason: HOSPADM

## 2022-02-18 RX ORDER — ACETAMINOPHEN 325 MG/1
650 TABLET ORAL ONCE
Status: COMPLETED | OUTPATIENT
Start: 2022-02-18 | End: 2022-02-18

## 2022-02-18 RX ORDER — ONDANSETRON 2 MG/ML
4 INJECTION INTRAMUSCULAR; INTRAVENOUS AS NEEDED
Status: DISCONTINUED | OUTPATIENT
Start: 2022-02-18 | End: 2022-02-18 | Stop reason: HOSPADM

## 2022-02-18 RX ORDER — SODIUM CHLORIDE, SODIUM LACTATE, POTASSIUM CHLORIDE, CALCIUM CHLORIDE 600; 310; 30; 20 MG/100ML; MG/100ML; MG/100ML; MG/100ML
INJECTION, SOLUTION INTRAVENOUS
Status: DISCONTINUED | OUTPATIENT
Start: 2022-02-18 | End: 2022-02-18 | Stop reason: HOSPADM

## 2022-02-18 RX ADMIN — FENTANYL CITRATE 25 MCG: 50 INJECTION, SOLUTION INTRAMUSCULAR; INTRAVENOUS at 11:42

## 2022-02-18 RX ADMIN — ROCURONIUM BROMIDE 10 MG: 10 SOLUTION INTRAVENOUS at 11:06

## 2022-02-18 RX ADMIN — DEXAMETHASONE SODIUM PHOSPHATE 4 MG: 4 INJECTION, SOLUTION INTRAMUSCULAR; INTRAVENOUS at 10:25

## 2022-02-18 RX ADMIN — FENTANYL CITRATE 25 MCG: 50 INJECTION, SOLUTION INTRAMUSCULAR; INTRAVENOUS at 12:59

## 2022-02-18 RX ADMIN — ACETAMINOPHEN 650 MG: 325 TABLET ORAL at 09:26

## 2022-02-18 RX ADMIN — SODIUM CHLORIDE, POTASSIUM CHLORIDE, SODIUM LACTATE AND CALCIUM CHLORIDE 50 ML/HR: 600; 310; 30; 20 INJECTION, SOLUTION INTRAVENOUS at 09:14

## 2022-02-18 RX ADMIN — GLYCOPYRROLATE 0.4 MG: 0.2 INJECTION, SOLUTION INTRAMUSCULAR; INTRAVENOUS at 11:37

## 2022-02-18 RX ADMIN — NEOSTIGMINE METHYLSULFATE 3 MG: 1 INJECTION, SOLUTION INTRAVENOUS at 11:37

## 2022-02-18 RX ADMIN — PROPOFOL 110 MG: 10 INJECTION, EMULSION INTRAVENOUS at 10:05

## 2022-02-18 RX ADMIN — ROCURONIUM BROMIDE 5 MG: 10 SOLUTION INTRAVENOUS at 10:05

## 2022-02-18 RX ADMIN — PROCHLORPERAZINE EDISYLATE 2.5 MG: 5 INJECTION, SOLUTION INTRAMUSCULAR; INTRAVENOUS at 12:53

## 2022-02-18 RX ADMIN — SUCCINYLCHOLINE CHLORIDE 100 MG: 20 INJECTION, SOLUTION INTRAMUSCULAR; INTRAVENOUS at 10:05

## 2022-02-18 RX ADMIN — FENTANYL CITRATE 25 MCG: 50 INJECTION, SOLUTION INTRAMUSCULAR; INTRAVENOUS at 10:05

## 2022-02-18 RX ADMIN — ROCURONIUM BROMIDE 25 MG: 10 SOLUTION INTRAVENOUS at 10:09

## 2022-02-18 RX ADMIN — FENTANYL CITRATE 25 MCG: 50 INJECTION, SOLUTION INTRAMUSCULAR; INTRAVENOUS at 11:33

## 2022-02-18 RX ADMIN — LIDOCAINE HYDROCHLORIDE 60 MG: 20 INJECTION, SOLUTION EPIDURAL; INFILTRATION; INTRACAUDAL; PERINEURAL at 10:05

## 2022-02-18 RX ADMIN — FENTANYL CITRATE 25 MCG: 50 INJECTION, SOLUTION INTRAMUSCULAR; INTRAVENOUS at 11:37

## 2022-02-18 RX ADMIN — SODIUM CHLORIDE, POTASSIUM CHLORIDE, SODIUM LACTATE AND CALCIUM CHLORIDE: 600; 310; 30; 20 INJECTION, SOLUTION INTRAVENOUS at 09:13

## 2022-02-18 RX ADMIN — ONDANSETRON HYDROCHLORIDE 4 MG: 2 SOLUTION INTRAMUSCULAR; INTRAVENOUS at 11:58

## 2022-02-18 RX ADMIN — WATER 2 G: 1 INJECTION INTRAMUSCULAR; INTRAVENOUS; SUBCUTANEOUS at 10:11

## 2022-02-18 RX ADMIN — ONDANSETRON HYDROCHLORIDE 4 MG: 2 INJECTION, SOLUTION INTRAMUSCULAR; INTRAVENOUS at 11:34

## 2022-02-18 RX ADMIN — ROCURONIUM BROMIDE 10 MG: 10 SOLUTION INTRAVENOUS at 10:48

## 2022-02-18 NOTE — ANESTHESIA PREPROCEDURE EVALUATION
Relevant Problems   NEUROLOGY   (+) Seizure (HCC)   (+) Seizure disorder (HCC)      CARDIOVASCULAR   (+) Arrhythmia      GASTROINTESTINAL   (+) GERD (gastroesophageal reflux disease)       Anesthetic History   No history of anesthetic complications            Review of Systems / Medical History  Patient summary reviewed, nursing notes reviewed and pertinent labs reviewed    Pulmonary            Asthma     Comments: PE in 1970s, 6 mo AC    Neuro/Psych     seizures: well controlled        Comments: Cerebral meningioma sp resection in 1990s  Cardiovascular            Dysrhythmias : PVC      Exercise tolerance: >4 METS     GI/Hepatic/Renal     GERD           Endo/Other        Arthritis     Other Findings   Comments: Nohemi Danlos syndrome   Large vessel in right nare that frequently bleeds           Physical Exam    Airway  Mallampati: II  TM Distance: 4 - 6 cm  Neck ROM: normal range of motion        Cardiovascular  Regular rate and rhythm,  S1 and S2 normal,  no murmur, click, rub, or gallop             Dental  No notable dental hx       Pulmonary  Breath sounds clear to auscultation               Abdominal  GI exam deferred       Other Findings            Anesthetic Plan    ASA: 3  Anesthesia type: general          Induction: Intravenous  Anesthetic plan and risks discussed with: Patient

## 2022-02-18 NOTE — BRIEF OP NOTE
Brief Postoperative Note    Patient: Randa Baumgarten  YOB: 1945  MRN: [id number]    Date of Procedure: 2/18/2022     Pre-Op Diagnosis: RIGHT INGUINAL HERNIA    Post-Op Diagnosis: Same as preoperative diagnosis. Procedure(s):  ROBOTIC RIGHT INGUINAL HERNIA REPAIR WITH MESH    Surgeon(s):  Eli Bergeron MD    Surgical Assistant: Surg Asst-1: Helen Rodriguez    Anesthesia: General     Estimated Blood Loss (mL): Minimal    Complications: None    Specimens: * No specimens in log *     Implants:   Implant Name Type Inv.  Item Serial No.  Lot No. LRB No. Used Action   MESH SLF-FLT PROGRIP RT -- PROGRIP - SNA Mesh MESH SLF-FLT PROGRIP RT -- PROGRIP NA [facility] SURGICAL [id number] Right 1 Implanted       Drains: * No LDAs found *    Findings: right indirect and femoral hernia repaired with a 15x10 Parietex Progrip mesh placed preperitoneal, numerous adhesions in the mid abdomen    Electronically Signed by Bryan Cleary MD on 2/18/2022 at 11:48 AM

## 2022-02-18 NOTE — PERIOP NOTES
Patient: Stanislaw Patterson MRN: [id number]  SSN: [id number]   YOB: 1945  Age: 64 y.o. Sex: female     Patient is status post Procedure(s):  ROBOTIC RIGHT INGUINAL HERNIA REPAIR WITH MESH. Surgeon(s) and Role:     Francois Levin MD - Primary    Local/Dose/Irrigation:  0.5% bupivacaine w epi                  Peripheral IV 02/18/22 Anterior;Right Wrist (Active)   Site Assessment Clean, dry, & intact 02/18/22 0913   Phlebitis Assessment 0 02/18/22 0913   Dressing Status Clean, dry, & intact 02/18/22 0913   Dressing Type Transparent 02/18/22 0913   Hub Color/Line Status Blue; Infusing 02/18/22 0913   Alcohol Cap Used Yes 02/18/22 0913                           Dressing/Packing:  Incision 02/18/22 Abdomen-Dressing/Treatment: Skin glue (02/18/22 1100)    Splint/Cast:  ]    Other:

## 2022-02-18 NOTE — H&P
[facility] at [address] [place] [address] Surgery History and Physical     History of Present Illness:      Jacolyn Lefort is a 64 y.o. female who has a right inguinal hernia. She has a previous history of a open left inguinal hernia repair and open umbilical hernia repair. She also has a history of laparoscopic cholecystectomy. She has noticed she has had a bulge in the right groin with some associated pain. The pain is a 2-3 out of 10 with standing and bending over. She has been having normal bowel movements.   The hernia bulge seems to reduce itself with relaxation.          Past Medical History:   Diagnosis Date    Arrhythmia 1990's     more frequently this year    Asthma      Bronchitis      Cancer ([address]       Skin cancer    Cerebral meningioma ([address] resection 1992    Cytomegalovirus ([address]      Nohemi-Danlos syndrome      GERD (gastroesophageal reflux disease)      Osteoarthritis      Other ill-defined conditions(799.89) 1992     brain tumor-benign    Pericarditis, acute      Thromboembolus ([address] 1975     PE x2               Past Surgical History:   Procedure Laterality Date    HX ADENOIDECTOMY        HX CHOLECYSTECTOMY        HX ENDOSCOPY   1996    HX GI         COLONOSCOPY    HX GYN   1973     ectopic pregnancy    HX GYN   1996 2003     VULVECTOMY    HX GYN   10/22/2021     precancerous area removed from vulva    HX HEENT   1992     removed brain tumor and plate inserted    HX HERNIA REPAIR         bilateral    HX ORTHOPAEDIC         bilateral knee surgery     HX ORTHOPAEDIC         BONE TAKEN OUT OF WRIST    HX OTHER SURGICAL         SKIN CA REMOVED FACE AND HEAD    HX TONSILLECTOMY        HX UROLOGICAL   2005     SLING    NEUROLOGICAL PROCEDURE UNLISTED   1992     meningioma    [place] COLONOSCOPY FLX DX W/COLLJ SPEC WHEN PFRMD   1/13/2011          [place] EXCIS INFRATENT MENINGIOMA        [place] FOREARM/WRIST SURGERY UNLISTED   2010     Right    [place] TREAT ECTOPIC PREG,ABD PREG              Current Outpatient Medications:     vit A/vit C/vit E/zinc/copper (PRESERVISION AREDS PO), Take  by mouth., Disp: , Rfl:     folic acid (FOLVITE) 534 mcg tablet, Take 800 mcg by mouth daily. , Disp: , Rfl:     cyanocobalamin (Vitamin B-12) 500 mcg tablet, Take 500 mcg by mouth daily. , Disp: , Rfl:     minoxidiL (ROGAINE) 2 % external solution, Apply  to affected area two (2) times a day. DROPS, Disp: , Rfl:     Dilantin Extended 100 mg ER capsule, TAKE 3 CAPSULES DAILY, Disp: 270 Cap, Rfl: 3    cholecalciferol (VITAMIN D3) 1,000 unit tablet, Take  by mouth daily. , Disp: , Rfl:     omega-3 fatty acids-vitamin e (FISH OIL) 1,000 mg cap, Take 1 Cap by mouth.  , Disp: , Rfl:     Biotin 2,500 mcg cap, Take  by mouth daily. , Disp: , Rfl:     cetirizine HCl (ZYRTEC PO), Take  by mouth. (Patient not taking: Reported on 1/12/2022), Disp: , Rfl:     diclofenac (VOLTAREN) 1 % gel, Apply  to affected area as needed for Pain. (Patient not taking: Reported on 1/12/2022), Disp: , Rfl:     diclofenac EC (VOLTAREN) 75 mg EC tablet, Take 75 mg by mouth two (2) times daily as needed for Pain. (Patient not taking: Reported on 1/12/2022), Disp: , Rfl:     acetaminophen (Tylenol Extra Strength) 500 mg tablet, Take  by mouth every six (6) hours as needed for Pain. (Patient not taking: Reported on 1/12/2022), Disp: , Rfl:            Allergies   Allergen Reactions    Epinephrine Unknown (comments)    Sulfa (Sulfonamide Antibiotics) Other (comments)       Kidneys shut down    Ephedrine Palpitations    Nsaids (Non-Steroidal Anti-Inflammatory Drug) Diarrhea       Indigestion, nausea    Advil [Ibuprofen] Other (comments)       Fluid retention.  Ampicillin Unknown (comments)       Pt is unsure if she is still allergic to [facility], was told to stay away b/c of repeated use as a child.     Codeine Unknown (comments)    Demerol [Meperidine] Unknown (comments)    Dilaudid [Hydromorphone (Bulk)] Unknown (comments)    Erythromycin Unknown (comments)    Levaquin [Levofloxacin] Other (comments)       GI upset    Lidocaine Shortness of Breath       Pt is unsure if she is allergic to lidocaine, pt reports she thinks she received lidocaine w/ epinephrine and had a reaction.  Ciprofloxacin Nausea and Vomiting         Social History            Socioeconomic History    Marital status:        Spouse name: Not on file    Number of children: Not on file    Years of education: Not on file    Highest education level: Not on file   Occupational History    Not on file   Tobacco Use    Smoking status: Former Smoker       Packs/day: 0.00       Years: 10.00       Pack years: 0.00       Types: Cigarettes       Quit date: 1973       Years since quittin.3    Smokeless tobacco: Never Used    Tobacco comment: Stopped in    Vaping Use    Vaping Use: Never used   Substance and Sexual Activity    Alcohol use: No       Alcohol/week: 0.0 standard drinks    Drug use: No    Sexual activity: Yes       Partners: Male       Birth control/protection: None       Comment: Too old to worry   Other Topics Concern    Not on file   Social History Narrative    Not on file      Social Determinants of Health          Financial Resource Strain:     Difficulty of Paying Living Expenses: Not on file   Food Insecurity:     Worried About Running Out of Food in the Last Year: Not on file    [place] of Food in the Last Year: Not on file   Transportation Needs:     Lack of Transportation (Medical): Not on file    Lack of Transportation (Non-Medical):  Not on file   Physical Activity:     Days of Exercise per Week: Not on file    Minutes of Exercise per Session: Not on file   Stress:     Feeling of Stress : Not on file   Social Connections:     Frequency of Communication with Friends and Family: Not on file    Frequency of Social Gatherings with Friends and Family: Not on file    Attends Cheondoism Services: Not on file   [place] Member of Clubs or Organizations: Not on file    Attends Club or Organization Meetings: Not on file    Marital Status: Not on file   Intimate Partner Violence:     Fear of Current or Ex-Partner: Not on file    Emotionally Abused: Not on file    Physically Abused: Not on file    Sexually Abused: Not on file   Housing Stability:     Unable to Pay for Housing in the Last Year: Not on file    Number of [place] in the Last Year: Not on file    Unstable Housing in the Last Year: Not on file               Family History   Problem Relation Age of Onset    Heart Attack Father      Other Father           Gi bleed    Heart Disease Father           [place]  74yo   [facility] Alcohol abuse Father      OSTEOARTHRITIS Father           gout    Other Mother           Pneumonia    Heart Disease Mother           CHF/pacemaker    OSTEOARTHRITIS Mother      Asthma Mother      Gall Bladder Disease Mother      Hypertension Mother      Breast Cancer Maternal Aunt 79    OSTEOARTHRITIS Paternal Grandfather           Rheumatoid         ROS   Constitutional: negative  Ears, Nose, Mouth, Throat, and Face: negative  Respiratory: negative  Cardiovascular: negative  Gastrointestinal: positive for Right groin bulge and pain  Genitourinary:negative  Integument/Breast: negative  Hematologic/Lymphatic: negative  Behavioral/Psychiatric: negative  Allergic/Immunologic: negative        Physical Exam:      Visit Vitals  /83 (BP 1 Location: Left arm, BP Patient Position: Sitting, BP Cuff Size: Adult)   Pulse 86   Temp 98.3 °F (36.8 °C) (Oral)   Resp 18   Ht 5' 4\" (1.626 m)   Wt 160 lb 12.8 oz (72.9 kg)   SpO2 97%   BMI 27.60 kg/m²         General - alert and oriented, no apparent distress  HEENT - no jaundice, no hearing imparement  Pulm - CTAB, no C/W/R  CV - RRR, no M/R/G  Abd -soft, nondistended, bowel sounds present, nontender to palpation, right inguinal hernia that is soft and reducible, small to medium size  Ext - pulses intact in UE and LE bilaterally, no edema  Skin - supple, no rashes  Psychiatric - normal affect, good mood     Labs        Lab Results   Component Value Date/Time     Sodium 141 10/19/2021 11:04 AM     Potassium 4.0 10/19/2021 11:04 AM     Chloride 108 10/19/2021 11:04 AM     CO2 29 10/19/2021 11:04 AM     Anion gap 4 (L) 10/19/2021 11:04 AM     Glucose 96 10/19/2021 11:04 AM     BUN 13 10/19/2021 11:04 AM     Creatinine 0.49 (L) 10/19/2021 11:04 AM     BUN/Creatinine ratio 27 (H) 10/19/2021 11:04 AM     GFR est AA >60 10/19/2021 11:04 AM     GFR est non-AA >60 10/19/2021 11:04 AM     Calcium 9.3 10/19/2021 11:04 AM     Bilirubin, total 0.3 10/19/2021 11:04 AM     Alk. phosphatase 171 (H) 10/19/2021 11:04 AM     Protein, total 7.1 10/19/2021 11:04 AM     Albumin 3.5 10/19/2021 11:04 AM     Globulin 3.6 10/19/2021 11:04 AM     A-G Ratio 1.0 (L) 10/19/2021 11:04 AM     ALT (SGPT) 20 10/19/2021 11:04 AM     AST (SGOT) 20 10/19/2021 11:04 AM            Lab Results   Component Value Date/Time     WBC 5.9 10/19/2021 11:04 AM     HGB 13.4 10/19/2021 11:04 AM     HCT 40.6 10/19/2021 11:04 AM     PLATELET 187 (L) 39/49/9400 11:04 AM     MCV 94.6 10/19/2021 11:04 AM            Imaging  none  I have reviewed and agree with all of the pertinent images     Assessment:      Sasha Davis is a 64 y.o. female with right inguinal hernia      Recommendations:      1. She does have a small to medium size right inguinal hernia and will need robotic right inguinal hernia repair with mesh in the OR. I have discussed the above procedure with the patient in detail. We reviewed the benefits and possible complications of the surgery which include bleeding, infection, damage to adjacent organs, venous thromboembolism, need for repeat surgery, death and other unforseen complications.   The patient agreed to proceed with the surgery.          Bowen Rondon MD

## 2022-02-18 NOTE — DISCHARGE INSTRUCTIONS
Inguinal Hernia Repair      Patient Discharge Instructions    Mundo Foster / [id number] : 1945    Admitted 2022 Discharged: 2022     · It is important that you take the medication exactly as they are prescribed. · Keep your medication in the bottles provided by the pharmacist and keep a list of the medication names, dosages, and times to be taken in your wallet. · Do not take other medications without consulting your doctor. · Wound care: You may shower starting tomorrow. Do not remove the dermabond on the incision, it will fall of on its own in a few weeks. · No heavy lifting or strenuous activity for 2wks after the operation    Take  oxycodone/acetaminophen (Percocet, Roxicet, Tylox) as needed for severe pain. What to do at Home    See instructions below. Follow-up with Dr. Muriel Lazar in 2 week(s). Call the office ([phone]) to schedule your appointment. Information obtained by :  I understand that if any problems occur once I am at home I am to contact my physician. I understand and acknowledge receipt of the instructions indicated above. 450  173 or R.N.'s Signature                                                                  Date/Time                                                                                                                                              Patient or Representative Signature                                                          Date/Time     Inguinal Hernia Repair: What to Expect at [address] are likely to have pain for the next few days. You may also feel like you have the flu, and you may have a low fever and feel tired and nauseated. This is common. You should feel better after a few days and will probably feel much better in 7 days.   For several weeks you may feel twinges or pulling in the hernia repair when you move. You may have some bruising on the scrotum and along the penis. This is normal. Men will need to wear a jockstrap or briefs, not boxers, for scrotal support for several days after a groin (inguinal) hernia repair. Spandex bicycle shorts may provide good support. This care sheet gives you a general idea about how long it will take for you to recover. But each person recovers at a different pace. Follow the steps below to get better as quickly as possible. How can you care for yourself at home? Activity  · Rest when you feel tired. Getting enough sleep will help you recover. Sleep with your head up by using three or four pillows. You can also try to sleep with your head up in a recliner chair. Do not sleep on your side or stomach. · Try to walk each day. Start by walking a little more than you did the day before. Bit by bit, increase the amount you walk. Walking boosts blood flow and helps prevent pneumonia and constipation. · Put ice or a cold pack on the area of your hernia repair for 10 to 20 minutes at a time. Try to do this every 1 to 2 hours for the first 24 hours (when you are awake) or until the swelling goes down. Put a thin cloth between the ice and your skin. · Avoid strenuous activities, such as biking, jogging, weight lifting, or aerobic exercise, until your doctor says it is okay. · Avoid lifting anything that would make you strain. This may include heavy grocery bags and milk containers, a heavy briefcase or backpack, cat litter or dog food bags, a vacuum , or a child. · You may drive when you are no longer taking pain medicine and can quickly move your foot from the gas pedal to the brake. You must also be able to sit comfortably for a long period of time, even if you do not plan to go far. You might get caught in traffic. · Most people are able to return to work within 1 to 2 weeks after surgery.   · You may shower 24 to 48 hours after surgery, if your doctor okays it. Pat the cut (incision) dry. Do not take a bath for the first 2 weeks, or until your doctor tells you it is okay. · Your doctor will tell you when you can have sex again. Diet  · You can eat your normal diet. If your stomach is upset, try bland, low-fat foods like plain rice, broiled chicken, toast, and yogurt. · Drink plenty of fluids (unless your doctor tells you not to). · You may notice that your bowel movements are not regular right after your surgery. This is common. Avoid constipation and straining with bowel movements. You may want to take a fiber supplement every day. If you have not had a bowel movement after a couple of days, ask your doctor about taking a mild laxative. Medicines  · Take pain medicines exactly as directed. ¨ If the doctor gave you a prescription medicine for pain, take it as prescribed. ¨ If you are not taking a prescription pain medicine, take an over-the-counter medicine such as acetaminophen (Tylenol), ibuprofen (Advil, Motrin), or naproxen (Aleve). Read and follow all instructions on the label. ¨ Do not take two or more pain medicines at the same time unless the doctor told you to. Many pain medicines have acetaminophen, which is Tylenol. Too much acetaminophen (Tylenol) can be harmful. · If your doctor prescribed antibiotics, take them as directed. Do not stop taking them just because you feel better. You need to take the full course of antibiotics. · If you think your pain medicine is making you sick to your stomach:  ¨ Take your medicine after meals (unless your doctor has told you not to). ¨ Ask your doctor for a different pain medicine. Incision care  · Wash the area daily with warm, soapy water and pat it dry. Follow-up care is a key part of your treatment and safety. Be sure to make and go to all appointments, and call your doctor if you are having problems.  It's also a good idea to know your test results and keep a list of the medicines you take. When should you call for help? Call 911 anytime you think you may need emergency care. For example, call if:  · You passed out (lost consciousness). · You have sudden chest pain and shortness of breath, or you cough up blood. · You have severe pain in your belly. Call your doctor now or seek immediate medical care if:  · You are sick to your stomach and cannot keep fluids down. · You have signs of a blood clot, such as:  ¨ Pain in your calf, back of the knee, thigh, or groin. ¨ Redness and swelling in your leg or groin. · You have trouble passing urine or stool, especially if you have mild pain or swelling in your lower belly. · Bright red blood has soaked through the bandage over your incision. Watch closely for any changes in your health, and be sure to contact your doctor if:  · Your swelling is getting worse. · Your swelling is not going down. Where can you learn more? Go to [employer]  Enter Y920 in the search box to learn more about \"Hernia Repair: What to Expect at Home. \"   © 2857-1196 [employer]. Care instructions adapted under license by [employer] (which disclaims liability or warranty for this information). This care instruction is for use with your licensed healthcare professional. If you have questions about a medical condition or this instruction, always ask your healthcare professional. [address] any warranty or liability for your use of this information. Content Version: 8.1.95947; Last Revised: January 21, 2011  ______________________________________________________________________    Anesthesia Discharge Instructions    After general anesthesia or intervenous sedation, for 24 hours or while taking prescription Narcotics:  · Limit your activities  · Do not drive or operate hazardous machinery  · If you have not urinated within 8 hours after discharge, please contact your surgeon on call.   · Do not make important personal or business decisions  · Do not drink alcoholic beverages    Report the following to your surgeon:  · Excessive pain, swelling, redness or odor of or around the surgical area  · Temperature over 100.5 degrees  · Nausea and vomiting lasting longer than 4 hours or if unable to take medication  · Any signs of decreased circulation or nerve impairment to extremity:  Change in color, persistent numbness, tingling, coldness or increased pain.   · Any questions

## 2022-02-18 NOTE — OP NOTES
[address]   OPERATIVE REPORT    Name:  Anita Berry  MR#:  [id number]  :  1945  ACCOUNT #:  [de-identified]  DATE OF SERVICE:  2022    PREOPERATIVE DIAGNOSIS:  Right inguinal hernia. POSTOPERATIVE DIAGNOSIS:  Right inguinal hernia. PROCEDURE PERFORMED:  Robotic right inguinal hernia repair with mesh. SURGEON:  Jimmy Jenkins MD    ASSISTANT:  Wilfredo Veloz SA    ANESTHESIA:  General.    COMPLICATIONS:  None. SPECIMENS REMOVED:  None. IMPLANTS:  Right-sided 15 x 10 cm Parietex ProGrip mesh placed in the preperitoneal plane. FINDINGS:  Numerous adhesions in the mid abdomen. ESTIMATED BLOOD LOSS:  Minimal.    INDICATION FOR THE OPERATION:  The patient is a 22-year-old female who has a right-sided inguinal hernia that is symptomatic and needing repair with mesh in the operating room. PROCEDURE:  The patient was met in the preoperative holding area, the H and P was updated, consent was signed. All risks and benefits were explained to the patient prior to the start of the operation. She was taken back to the operating room. She was lying in a supine position. The abdomen was prepped and draped in standard sterile fashion. Time-out was called, the antibiotics were given, SCDs were on lower extremities. We started the operation by making an 8 mm incision into the left upper quadrant, inserting a da Precious VisiPort trocar into the intra-abdominal cavity, insufflating to 15 mmHg. We then placed an 8 mm trocar superior to the umbilicus, another 8 mm port in the right upper quadrant. We then docked the [employer] robot onto the patient. We had to take down a number of adhesions in the upper abdomen and mid abdomen with the [employer] robot and scissors. Once these were taken down, we could see the right-sided inguinal hernia, it appeared to be an indirect inguinal hernia.   We started our dissection in the preperitoneal plane, going from medial to lateral in the right lower quadrant, dissecting all the way down, inferiorly down to the pubic bone to the hernia sac into the lateral abdominal wall. We started dissecting our hernia sac out of the inguinal canal and dissected that free. We also dissected out completely the round ligament as well. We cauterized the round ligament very well, and then we divided that with the scissors with heat, and then we continued our dissection. We found a small femoral hernia with a little bit of fat coming in through the defect and a small tiny obturator defect with a little bit of fat but no true intraperitoneal hernia. We would cover our mesh with all of these places to repair the inguinal hernia. We then had widened our dissection plane down well below the pubic bone along the lateral abdominal wall and then placed our 15 x 10 cm right-sided Parietex ProGrip mesh in the preperitoneal plane, centering it over hernia defects. We made it well below the pubic bone to cover the obturator defect, the femoral defect as well as the indirect defect. We then sutured the mesh to the pubic bone with a 2-0 Ethibond suture in two locations, and then the mesh was fixed. We then closed the peritoneal flap with a running 3-0 absorbable V-Loc suture in a running fashion, and then we closed one small hole in the peritoneum with 3-0 Vicryl interrupted suture, and then we removed our sutures out from the dissection. The operation was complete. We then undocked the [employer] robot, desufflated the abdominal cavity, removed the trocars and closed the skin with 4-0 Monocryl and Dermabond to complete the operation. Dr. John Mead was present and scrubbed during the entire operation. The counts were correct.       MD KRISHAN [email or web address]  D:  02/18/2022 12:03  T:  02/18/2022 16:14  JOB #:  [id number]

## 2022-03-01 ENCOUNTER — OFFICE VISIT (OUTPATIENT)
Dept: SURGERY | Age: 77
End: 2022-03-01
Payer: MEDICARE

## 2022-03-01 VITALS
DIASTOLIC BLOOD PRESSURE: 84 MMHG | OXYGEN SATURATION: 96 % | SYSTOLIC BLOOD PRESSURE: 138 MMHG | HEART RATE: 76 BPM | BODY MASS INDEX: 27.9 KG/M2 | WEIGHT: 163.4 LBS | RESPIRATION RATE: 18 BRPM | TEMPERATURE: 98.5 F | HEIGHT: 64 IN

## 2022-03-01 DIAGNOSIS — Z09 FOLLOW-UP EXAMINATION AFTER ABDOMINAL SURGERY: Primary | ICD-10-CM

## 2022-03-01 DIAGNOSIS — Z98.890 S/P RIGHT INGUINAL HERNIORRHAPHY: ICD-10-CM

## 2022-03-01 DIAGNOSIS — Z87.19 S/P RIGHT INGUINAL HERNIORRHAPHY: ICD-10-CM

## 2022-03-01 PROCEDURE — 99024 POSTOP FOLLOW-UP VISIT: CPT | Performed by: NURSE PRACTITIONER

## 2022-03-01 NOTE — PATIENT INSTRUCTIONS
Recommendations after hernia surgery:    -  Avoid heavy lifting and or straining anything > about 15 lbs for another 2 weeks     -  Avoid abdominal exercises for another 2 weeks     -  Daily walking and resuming your normal day to day activities is encouraged and as tolerated      -  Ok to use the stationary bike     -  Ok to bath as normal and you may get in a swimming pool     You may proceed with cataract surgery as planned     -  ok to moisturize the incisions as desired

## 2022-03-01 NOTE — PROGRESS NOTES
1. Have you been to the ER, urgent care clinic since your last visit? Hospitalized since your last visit? no    2. Have you seen or consulted any other health care providers outside of the [address] [place] [address] since your last visit? Include any pap smears or colon screening.  no

## 2022-03-04 NOTE — PROGRESS NOTES
Chief Complaint   Patient presents with    Post OP Follow Up     2 weeks s/p robotic right inguinal hernia repair with mesh     Storm Crane 2-week status post robotic right inguinal hernia repair with mesh. She is doing well. No fever, chills, chest pain or shortness of breath. She has some abdominal soreness mostly on the right side and she has been taking it easy. She is voiding without difficulty  Bowels are moving most days  She is tolerating a light diet but does not have much of an appetite  No nausea or vomiting  Overall she is pleased with her recovery process    Visit Vitals  /84   Pulse 76   Temp 98.5 °F (36.9 °C) (Oral)   Resp 18   Ht 5' 4\" (1.626 m)   Wt 163 lb 6.4 oz (74.1 kg)   SpO2 96%   BMI 28.05 kg/m²     A + O x 3, appears well  Chest  CTA  COR  RRR  ABD Soft, lap sites are clean, dry and intact without erythema or induration, mild tenderness mostly right lower abdomen without rebound or guarding /ND, +BS, no masses or hernias. EXT No edema; ambulating independently        ICD-10-CM ICD-9-CM    1. Follow-up examination after abdominal surgery  Z09 V67.09    2. S/P right inguinal herniorrhaphy  Z98.890 V45.89     Z87.19       2-week status post right inguinal hernia repair with mesh doing well  Continue to avoid heavy lifting, pushing, pulling and no abdominal exercises for another month or so  She may walk and swim and do light activities around the home  Abdominal support was recommended as well as a heating pad as needed for discomfort  X strength Tylenol 2 tabs every 8 hours as needed for pain  Diet as tolerated  Doing well and discharge from surgical care with as needed follow-up  Storm Crane verbalized understanding and questions were answered to the best of my knowledge and ability. Activity educational materials were provided.

## 2022-03-18 PROBLEM — N90.89 VULVAR LESION: Status: ACTIVE | Noted: 2021-08-10

## 2022-03-20 PROBLEM — D07.1 VULVAR INTRAEPITHELIAL NEOPLASIA (VIN) GRADE 3: Status: ACTIVE | Noted: 2021-12-13

## 2022-05-19 PROBLEM — N90.89 VULVAR LESION: Status: RESOLVED | Noted: 2021-08-10 | Resolved: 2022-05-19

## 2022-05-20 ENCOUNTER — OFFICE VISIT (OUTPATIENT)
Dept: INTERNAL MEDICINE CLINIC | Age: 77
End: 2022-05-20
Payer: MEDICARE

## 2022-05-20 VITALS
WEIGHT: 163 LBS | DIASTOLIC BLOOD PRESSURE: 74 MMHG | HEART RATE: 67 BPM | HEIGHT: 64 IN | TEMPERATURE: 97.8 F | BODY MASS INDEX: 27.83 KG/M2 | RESPIRATION RATE: 12 BRPM | OXYGEN SATURATION: 96 % | SYSTOLIC BLOOD PRESSURE: 136 MMHG

## 2022-05-20 DIAGNOSIS — D32.0 CEREBRAL MENINGIOMA (HCC): ICD-10-CM

## 2022-05-20 DIAGNOSIS — Z12.31 SCREENING MAMMOGRAM FOR BREAST CANCER: ICD-10-CM

## 2022-05-20 DIAGNOSIS — D32.9 MENINGIOMA (HCC): ICD-10-CM

## 2022-05-20 DIAGNOSIS — Z79.899 MEDICATION MANAGEMENT: ICD-10-CM

## 2022-05-20 DIAGNOSIS — Z11.59 NEED FOR HEPATITIS C SCREENING TEST: ICD-10-CM

## 2022-05-20 DIAGNOSIS — K21.9 GASTROESOPHAGEAL REFLUX DISEASE WITHOUT ESOPHAGITIS: ICD-10-CM

## 2022-05-20 DIAGNOSIS — Z00.00 MEDICARE ANNUAL WELLNESS VISIT, SUBSEQUENT: Primary | ICD-10-CM

## 2022-05-20 DIAGNOSIS — E53.8 B12 DEFICIENCY: ICD-10-CM

## 2022-05-20 DIAGNOSIS — M81.0 AGE-RELATED OSTEOPOROSIS WITHOUT CURRENT PATHOLOGICAL FRACTURE: ICD-10-CM

## 2022-05-20 DIAGNOSIS — G40.909 SEIZURE DISORDER (HCC): ICD-10-CM

## 2022-05-20 DIAGNOSIS — R56.9 SEIZURE (HCC): ICD-10-CM

## 2022-05-20 PROCEDURE — G8427 DOCREV CUR MEDS BY ELIG CLIN: HCPCS | Performed by: INTERNAL MEDICINE

## 2022-05-20 PROCEDURE — G0439 PPPS, SUBSEQ VISIT: HCPCS | Performed by: INTERNAL MEDICINE

## 2022-05-20 PROCEDURE — G8417 CALC BMI ABV UP PARAM F/U: HCPCS | Performed by: INTERNAL MEDICINE

## 2022-05-20 PROCEDURE — 1101F PT FALLS ASSESS-DOCD LE1/YR: CPT | Performed by: INTERNAL MEDICINE

## 2022-05-20 PROCEDURE — G8536 NO DOC ELDER MAL SCRN: HCPCS | Performed by: INTERNAL MEDICINE

## 2022-05-20 PROCEDURE — G0463 HOSPITAL OUTPT CLINIC VISIT: HCPCS | Performed by: INTERNAL MEDICINE

## 2022-05-20 PROCEDURE — 1090F PRES/ABSN URINE INCON ASSESS: CPT | Performed by: INTERNAL MEDICINE

## 2022-05-20 PROCEDURE — 99214 OFFICE O/P EST MOD 30 MIN: CPT | Performed by: INTERNAL MEDICINE

## 2022-05-20 PROCEDURE — G8510 SCR DEP NEG, NO PLAN REQD: HCPCS | Performed by: INTERNAL MEDICINE

## 2022-05-20 RX ORDER — PHENYTOIN SODIUM 100 MG/1
CAPSULE, EXTENDED RELEASE ORAL
Qty: 270 CAPSULE | Refills: 3 | Status: SHIPPED | OUTPATIENT
Start: 2022-05-20

## 2022-05-20 NOTE — PROGRESS NOTES
This is the Subsequent Medicare Annual Wellness Exam, performed 12 months or more after the Initial AWV or the last Subsequent AWV    I have reviewed the patient's medical history in detail and updated the computerized patient record. Also for follow-up of her health issues. Recently she has had some difficulties. She has had hernia repair as well as a skin lesion removed from her vulva. She is also had cataract surgery. She has ongoing pain in both of her knees and gets injections for those. She is recently had some left hip discomfort as well. No seizures. No nausea or vomiting. Some occasional pins and needle sensation around her incisions from her hernia surgery. ROS - per HPI    Physical Examination: General appearance - alert, well appearing, and in no distress  Ears - bilateral TM's and external ear canals normal  Mouth - mucous membranes moist, pharynx normal without lesions  Neck - supple, no significant adenopathy  Lymphatics - no palpable lymphadenopathy, no hepatosplenomegaly  Chest - clear to auscultation, no wheezes, rales or rhonchi, symmetric air entry  Heart - normal rate, regular rhythm, normal S1, S2, no murmurs, rubs, clicks or gallops  Abdomen - soft, nontender, nondistended, no masses or organomegaly  Neurological - alert, oriented, normal speech, no focal findings or movement disorder noted  Musculoskeletal - abnormal exam of both knees with DJD changes. Tender over the left greater trochanteric bursa  Extremities - peripheral pulses normal, no pedal edema, no clubbing or cyanosis        Assessment/Plan   Education and counseling provided:  Are appropriate based on today's review and evaluation  End-of-Life planning (with patient's consent)  Screening Mammography  Bone mass measurement (DEXA)  Diabetes screening test    1. Medication management  -     TSH 3RD GENERATION; Future  2. Seizure disorder (HCC)-no recurrence. Last years Dilantin level was low.   We will repeat that today.  -     Dilantin Extended 100 mg ER capsule; TAKE 3 CAPSULES DAILY, Normal, Disp-270 Capsule, R-3, DEEPIKA  -     METABOLIC PANEL, COMPREHENSIVE; Future  -     CBC WITH AUTOMATED DIFF; Future  -     TSH 3RD GENERATION; Future  -     PHENYTOIN; Future  3. Seizure ([address]  -     TSH 3RD GENERATION; Future  4. Meningioma (HCC)-no recurrence. 5. Cerebral meningioma (HCC)  -     TSH 3RD GENERATION; Future  6. Gastroesophageal reflux disease without esophagitis-stable with diet changes.  -     TSH 3RD GENERATION; Future  7. B12 deficiency-check levels to be sure that oral supplements have helped. -     TSH 3RD GENERATION; Future  -     VITAMIN B12; Future  8. Medicare annual wellness visit, subsequent  9. Need for hepatitis C screening test  -     HEPATITIS C AB; Future  10. Screening mammogram for breast cancer  -     [facility] MAMMO BI SCREENING INCL CAD; Future  11. Age-related osteoporosis without current pathological fracture-repeat bone density as she does says she would consider medications if needed. -     DEXA BONE DENSITY STUDY AXIAL; Future       Depression Risk Factor Screening     3 most recent PHQ Screens 5/20/2022   Little interest or pleasure in doing things Not at all   Feeling down, depressed, irritable, or hopeless Not at all   Total Score PHQ 2 0       Alcohol & Drug Abuse Risk Screen    Do you average more than 1 drink per night or more than 7 drinks a week:  No    On any one occasion in the past three months have you have had more than 3 drinks containing alcohol:  No          Functional Ability and Level of Safety    Hearing: Hearing is good. Activities of Daily Living: The home contains: no safety equipment. Patient does total self care      Ambulation: with no difficulty     Fall Risk:  Fall Risk Assessment, last 12 mths 5/20/2022   Able to walk? Yes   Fall in past 12 months? 0   Do you feel unsteady? 0   Are you worried about falling 0   Is TUG test greater than 12 seconds?  -   Is the gait abnormal? -   Number of falls in past 12 months -   Fall with injury?  -      Abuse Screen:  Patient is not abused       Cognitive Screening    Has your family/caregiver stated any concerns about your memory: no         Health Maintenance Due     Health Maintenance Due   Topic Date Due    Hepatitis C Screening  Never done       Patient Care Team   Patient Care Team:  Bradley Carlin MD as PCP - General  Bradley Carlin MD as PCP - Lakeisha Brandt Provider  Armando Jones MD (Ophthalmology)    History     Patient Active Problem List   Diagnosis Code    Seizure 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    B12 deficiency E53.8    Meningioma ([address] D32.9     Past Medical History:   Diagnosis Date    Arrhythmia 1990's    PVC'S    Asthma     Bronchitis     Cancer ([address]     Skin cancer SCC, BCC    Cerebral meningioma ([address] resection 1992    Chronic pain     BACK, KNEES, JOINTS    Cytomegalovirus ([address]     Nohemi-Danlos syndrome     GERD (gastroesophageal reflux disease)     Osteoarthritis     Other ill-defined conditions(799.89) 1992    brain tumor-benign    Pericarditis, acute 1960'S    Thromboembolus ([address] 1975    PE x2      Past Surgical History:   Procedure Laterality Date    HX ADENOIDECTOMY      HX CATARACT REMOVAL Left 01/24/2022    HX CHOLECYSTECTOMY      HX ENDOSCOPY  1996    HX GI      COLONOSCOPY    HX GYN  1973    ectopic pregnancy    HX GYN  1996 2003    VULVECTOMY    HX GYN  10/22/2021    precancerous area removed from vulva    HX HEENT  1992    removed brain tumor and plate inserted    HX HERNIA REPAIR      bilateral    HX HERNIA REPAIR  02/18/2022    robotic right inguinal hernia repair with mesh by Dr[facility]chana [facility]ime [facility]    [id number] ARTHROSCOPY      bilateral knee surgery     HX ORTHOPAEDIC Right     BONE TAKEN OUT OF WRIST    HX OTHER SURGICAL      SKIN CA REMOVED FACE AND HEAD    HX TONSILLECTOMY      HX UROLOGICAL  2005    SLING    NEUROLOGICAL PROCEDURE UNLISTED  1992    meningioma    RI COLONOSCOPY FLX DX W/COLLJ SPEC WHEN PFRMD  1/13/2011         RI EXCIS INFRATENT MENINGIOMA      RI FOREARM/WRIST SURGERY UNLISTED  2010    Right    RI TREAT ECTOPIC PREG,ABD PREG       Current Outpatient Medications   Medication Sig Dispense Refill    Dilantin Extended 100 mg ER capsule TAKE 3 CAPSULES DAILY 270 Capsule 3    vit A/vit C/vit E/zinc/copper (PRESERVISION AREDS PO) Take 2 Tablets by mouth daily.  folic acid (FOLVITE) 651 mcg tablet Take 800 mcg by mouth daily.  cyanocobalamin (Vitamin B-12) 500 mcg tablet Take 5,000 mcg by mouth daily.  minoxidiL (ROGAINE) 2 % external solution Apply  to affected area two (2) times a day. DROPS      acetaminophen (Tylenol Extra Strength) 500 mg tablet Take  by mouth every six (6) hours as needed for Pain.  cholecalciferol (VITAMIN D3) 1,000 unit tablet Take  by mouth daily.  omega-3 fatty acids-vitamin e (FISH OIL) 1,000 mg cap Take 1 Capsule by mouth daily.  Biotin 2,500 mcg cap Take  by mouth daily. Allergies   Allergen Reactions    Epinephrine Unknown (comments)     \"PASSED OUT, \"IRREGULAR HEART BEAT\"    Sulfa (Sulfonamide Antibiotics) Other (comments)     Kidneys shut down AS CHILD    Ephedrine Palpitations    Nsaids (Non-Steroidal Anti-Inflammatory Drug) Diarrhea     Indigestion, nausea    Advil [Ibuprofen] Other (comments)     Fluid retention.  Ampicillin Unknown (comments)     DIARRHEA, INDIGESTION,  Patient screened for any delayed non-IgE-mediated reaction to PCN.         Patient notes the following:    No delayed non-IgE-mediated reaction to PCN            Codeine Nausea and Vomiting    Demerol [Meperidine] Nausea and Vomiting    Dilaudid [Hydromorphone (Bulk)] Unknown (comments)     \"SEVERE SHAKING\"    Erythromycin Diarrhea    Levaquin [Levofloxacin] Other (comments)     GI upset    Lidocaine Shortness of Breath     Pt is unsure if she is allergic to lidocaine, pt reports she thinks she received lidocaine w/ epinephrine and had a reaction.       Ciprofloxacin Nausea and Vomiting       Family History   Problem Relation Age of Onset    Heart Attack Father     Other Father         Gi bleed    Heart Disease Father         [place]  74yo   [place] Alcohol abuse Father     OSTEOARTHRITIS Father         gout    Other Mother         Pneumonia    Heart Disease Mother         CHF/pacemaker    OSTEOARTHRITIS Mother     Asthma Mother    Vonzella Tab Bladder Disease Mother     Hypertension Mother     Breast Cancer Maternal Aunt 79    OSTEOARTHRITIS Paternal Grandfather         Rheumatoid    Anesth Problems Neg Hx      Social History     Tobacco Use    Smoking status: Former Smoker     Packs/day: 0.00     Years: 10.00     Pack years: 0.00     Types: Cigarettes     Quit date: 1973     Years since quittin.5    Smokeless tobacco: Never Used    Tobacco comment: Stopped in    Substance Use Topics    Alcohol use: No     Alcohol/week: 0.0 standard drinks         Lindsay Foreman MD

## 2022-05-20 NOTE — PATIENT INSTRUCTIONS
Medicare Wellness Visit, Female     The best way to live healthy is to have a lifestyle where you eat a well-balanced diet, exercise regularly, limit alcohol use, and quit all forms of tobacco/nicotine, if applicable. Regular preventive services are another way to keep healthy. Preventive services (vaccines, screening tests, monitoring & exams) can help personalize your care plan, which helps you manage your own care. Screening tests can find health problems at the earliest stages, when they are easiest to treat. [facility] follows the current, evidence-based guidelines published by the [facility] Patricio Mendoza ([facility]) when recommending preventive services for our patients. Because we follow these guidelines, sometimes recommendations change over time as research supports it. (For example, mammograms used to be recommended annually. Even though Medicare will still pay for an annual mammogram, the newer guidelines recommend a mammogram every two years for women of average risk). Of course, you and your doctor may decide to screen more often for some diseases, based on your risk and your co-morbidities (chronic disease you are already diagnosed with). Preventive services for you include:  - Medicare offers their members a free annual wellness visit, which is time for you and your primary care provider to discuss and plan for your preventive service needs. Take advantage of this benefit every year!  -All adults over the age of 72 should receive the recommended pneumonia vaccines. Current USPSTF guidelines recommend a series of two vaccines for the best pneumonia protection.   -All adults should have a flu vaccine yearly and a tetanus vaccine every 10 years.   -All adults age 48 and older should receive the shingles vaccines (series of two vaccines).       -All adults age 38-68 who are overweight should have a diabetes screening test once every three years.   -All adults born between [birth year] and 1967 should be screened once for Hepatitis C.  -Other screening tests and preventive services for persons with diabetes include: an eye exam to screen for diabetic retinopathy, a kidney function test, a foot exam, and stricter control over your cholesterol.   -Cardiovascular screening for adults with routine risk involves an electrocardiogram (ECG) at intervals determined by your doctor.   -Colorectal cancer screenings should be done for adults age 54-65 with no increased risk factors for colorectal cancer. There are a number of acceptable methods of screening for this type of cancer. Each test has its own benefits and drawbacks. Discuss with your doctor what is most appropriate for you during your annual wellness visit. The different tests include: colonoscopy (considered the best screening method), a fecal occult blood test, a fecal DNA test, and sigmoidoscopy.    -A bone mass density test is recommended when a woman turns 65 to screen for osteoporosis. This test is only recommended one time, as a screening. Some providers will use this same test as a disease monitoring tool if you already have osteoporosis. -Breast cancer screenings are recommended every other year for women of normal risk, age 54-69.  -Cervical cancer screenings for women over age 72 are only recommended with certain risk factors. Here is a list of your current Health Maintenance items (your personalized list of preventive services) with a due date:  Health Maintenance Due   Topic Date Due    Hepatitis C Test  Never done            Hip Arthritis: Exercises  Introduction  Here are some examples of exercises for you to try. The exercises may be suggested for a condition or for rehabilitation. Start each exercise slowly. Ease off the exercises if you start to have pain. You will be told when to start these exercises and which ones will work best for you.   How to do the exercises  Straight-leg raises to the outside    1. Lie on your side, with your affected hip on top. 2. Tighten the front thigh muscles of your top leg to keep your knee straight. 3. Keep your hip and your leg straight in line with the rest of your body, and keep your knee pointing forward. Do not drop your hip back. 4. Lift your top leg straight up toward the ceiling, about 12 inches off the floor. Hold for about 6 seconds, then slowly lower your leg. 5. Repeat 8 to 12 times. 6. Switch legs and repeat steps 1 through 5, even if only one hip is sore. Straight-leg raises to the inside    1. Lie on your side with your affected hip on the floor. 2. You can either prop up your other leg on a chair, or you can bend that knee and put that foot in front of your other knee. Do not drop your hip back. 3. Tighten the muscles on the front thigh of your bottom leg to straighten that knee. 4. Keep your kneecap pointing forward and your leg straight, and lift your bottom leg up toward the ceiling about 6 inches. Hold for about 6 seconds, then lower slowly. 5. Repeat 8 to 12 times. 6. Switch legs and repeat steps 1 through 5, even if only one hip is sore. Hip hike    1. Stand sideways on the bottom step of a staircase, and hold on to the banister or wall. 2. Keeping both knees straight, lift your good leg off the step and let it hang down. Then hike your good hip up to the same level as your affected hip or a little higher. 3. Repeat 8 to 12 times. 4. Switch legs and repeat steps 1 through 3, even if only one hip is sore. Bridging    1. Lie on your back with both knees bent. Your knees should be bent about 90 degrees. 2. Then push your feet into the floor, squeeze your buttocks, and lift your hips off the floor until your shoulders, hips, and knees are all in a straight line. 3. Hold for about 6 seconds as you continue to breathe normally, and then slowly lower your hips back down to the floor and rest for up to 10 seconds.   4. Repeat 8 to 12 times. Hamstring stretch (lying down)    1. Lie flat on your back with your legs straight. If you feel discomfort in your back, place a small towel roll under your lower back. 2. Holding the back of your affected leg, lift your leg straight up and toward your body until you feel a stretch at the back of your thigh. 3. Hold the stretch for at least 30 seconds. 4. Repeat 2 to 4 times. 5. Switch legs and repeat steps 1 through 4, even if only one hip is sore. Standing quadriceps stretch    1. If you are not steady on your feet, hold on to a chair, counter, or wall. You can also lie on your stomach or your side to do this exercise. 2. Bend the knee of the leg you want to stretch, and reach behind you to grab the front of your foot or ankle with the hand on the same side. For example, if you are stretching your right leg, use your right hand. 3. Keeping your knees next to each other, pull your foot toward your buttock until you feel a gentle stretch across the front of your hip and down the front of your thigh. Your knee should be pointed directly to the ground, and not out to the side. 4. Hold the stretch for at least 15 to 30 seconds. 5. Repeat 2 to 4 times. 6. Switch legs and repeat steps 1 through 5, even if only one hip is sore. Hip rotator stretch    1. Lie on your back with both knees bent and your feet flat on the floor. 2. Put the ankle of your affected leg on your opposite thigh near your knee. 3. Use your hand to gently push your knee away from your body until you feel a gentle stretch around your hip. 4. Hold the stretch for 15 to 30 seconds. 5. Repeat 2 to 4 times. 6. Repeat steps 1 through 5, but this time use your hand to gently pull your knee toward your opposite shoulder. 7. Switch legs and repeat steps 1 through 6, even if only one hip is sore. Knee-to-chest    1. Lie on your back with your knees bent and your feet flat on the floor.   2. Bring your affected leg to your chest, keeping the other foot flat on the floor (or keeping the other leg straight, whichever feels better on your lower back). 3. Keep your lower back pressed to the floor. Hold for at least 15 to 30 seconds. 4. Relax, and lower the knee to the starting position. 5. Repeat 2 to 4 times. 6. Switch legs and repeat steps 1 through 5, even if only one hip is sore. 7. To get more stretch, put your other leg flat on the floor while pulling your knee to your chest.  Clamshell    1. Lie on your side, with your affected hip on top. Keep your feet and knees together and your knees bent. 2. Raise your top knee, but keep your feet together. Do not let your hips roll back. Your legs should open up like a clamshell. 3. Hold for 6 seconds. 4. Slowly lower your knee back down. Rest for 10 seconds. 5. Repeat 8 to 12 times. 6. Switch legs and repeat steps 1 through 5, even if only one hip is sore. Follow-up care is a key part of your treatment and safety. Be sure to make and go to all appointments, and call your doctor if you are having problems. It's also a good idea to know your test results and keep a list of the medicines you take. Where can you learn more? Go to http://www.bronson.com/  Enter K943 in the search box to learn more about \"Hip Arthritis: Exercises. \"  Current as of: July 1, 2021               Content Version: 13.2  © 2006-2022 Healthwise, Incorporated. Care instructions adapted under license by [employer] (which disclaims liability or warranty for this information). If you have questions about a medical condition or this instruction, always ask your healthcare professional. [place] [address] any warranty or liability for your use of this information. Hip Bursitis: Exercises  Introduction  Here are some examples of exercises for you to try. The exercises may be suggested for a condition or for rehabilitation. Start each exercise slowly.  Ease off the exercises if you start to have pain. You will be told when to start these exercises and which ones will work best for you. How to do the exercises  Hip rotator stretch    1. Lie on your back with both knees bent and your feet flat on the floor. 2. Put the ankle of your affected leg on your opposite thigh near your knee. 3. Use your hand to gently push your knee away from your body until you feel a gentle stretch around your hip. 4. Hold the stretch for 15 to 30 seconds. 5. Repeat 2 to 4 times. 6. Repeat steps 1 through 5, but this time use your hand to gently pull your knee toward your opposite shoulder. Iliotibial band stretch    1. Lean sideways against a wall. If you are not steady on your feet, hold on to a chair or counter. 2. Stand on the leg with the affected hip, with that leg close to the wall. Then cross your other leg in front of it. 3. Let your affected hip drop out to the side of your body and against wall. Then lean away from your affected hip until you feel a stretch. 4. Hold the stretch for 15 to 30 seconds. 5. Repeat 2 to 4 times. Straight-leg raises to the outside    1. Lie on your side, with your affected hip on top. 2. Tighten the front thigh muscles of your top leg to keep your knee straight. 3. Keep your hip and your leg straight in line with the rest of your body, and keep your knee pointing forward. Do not drop your hip back. 4. Lift your top leg straight up toward the ceiling, about 12 inches off the floor. Hold for about 6 seconds, then slowly lower your leg. 5. Repeat 8 to 12 times. Clamshell    1. Lie on your side, with your affected hip on top and your head propped on a pillow. Keep your feet and knees together and your knees bent. 2. Raise your top knee, but keep your feet together. Do not let your hips roll back. Your legs should open up like a clamshell. 3. Hold for 6 seconds. 4. Slowly lower your knee back down. Rest for 10 seconds. 5. Repeat 8 to 12 times.   Follow-up care is a key part of your treatment and safety. Be sure to make and go to all appointments, and call your doctor if you are having problems. It's also a good idea to know your test results and keep a list of the medicines you take. Where can you learn more? Go to http://www.bronson.com/  Enter H674 in the search box to learn more about \"Hip Bursitis: Exercises. \"  Current as of: July 1, 2021               Content Version: 13.2  © 2006-2022 Healthwise, Incorporated. Care instructions adapted under license by [employer] (which disclaims liability or warranty for this information). If you have questions about a medical condition or this instruction, always ask your healthcare professional. [address] any warranty or liability for your use of this information.

## 2022-05-21 LAB
ALBUMIN SERPL-MCNC: 3.9 G/DL (ref 3.5–5)
ALBUMIN/GLOB SERPL: 1.3 {RATIO} (ref 1.1–2.2)
ALP SERPL-CCNC: 181 U/L (ref 45–117)
ALT SERPL-CCNC: 18 U/L (ref 12–78)
ANION GAP SERPL CALC-SCNC: 4 MMOL/L (ref 5–15)
AST SERPL-CCNC: 20 U/L (ref 15–37)
BASOPHILS # BLD: 0 K/UL (ref 0–0.1)
BASOPHILS NFR BLD: 0 % (ref 0–1)
BILIRUB SERPL-MCNC: 0.2 MG/DL (ref 0.2–1)
BUN SERPL-MCNC: 13 MG/DL (ref 6–20)
BUN/CREAT SERPL: 30 (ref 12–20)
CALCIUM SERPL-MCNC: 9.6 MG/DL (ref 8.5–10.1)
CHLORIDE SERPL-SCNC: 107 MMOL/L (ref 97–108)
CO2 SERPL-SCNC: 29 MMOL/L (ref 21–32)
CREAT SERPL-MCNC: 0.43 MG/DL (ref 0.55–1.02)
DIFFERENTIAL METHOD BLD: ABNORMAL
EOSINOPHIL # BLD: 0 K/UL (ref 0–0.4)
EOSINOPHIL NFR BLD: 0 % (ref 0–7)
ERYTHROCYTE [DISTWIDTH] IN BLOOD BY AUTOMATED COUNT: 13.7 % (ref 11.5–14.5)
GLOBULIN SER CALC-MCNC: 3.1 G/DL (ref 2–4)
GLUCOSE SERPL-MCNC: 96 MG/DL (ref 65–100)
HCT VFR BLD AUTO: 41.6 % (ref 35–47)
HCV AB SERPL QL IA: NONREACTIVE
HGB BLD-MCNC: 12.8 G/DL (ref 11.5–16)
IMM GRANULOCYTES # BLD AUTO: 0 K/UL (ref 0–0.04)
IMM GRANULOCYTES NFR BLD AUTO: 0 % (ref 0–0.5)
LYMPHOCYTES # BLD: 1.4 K/UL (ref 0.8–3.5)
LYMPHOCYTES NFR BLD: 20 % (ref 12–49)
MCH RBC QN AUTO: 30.1 PG (ref 26–34)
MCHC RBC AUTO-ENTMCNC: 30.8 G/DL (ref 30–36.5)
MCV RBC AUTO: 97.9 FL (ref 80–99)
MONOCYTES # BLD: 0.5 K/UL (ref 0–1)
MONOCYTES NFR BLD: 7 % (ref 5–13)
NEUTS SEG # BLD: 4.9 K/UL (ref 1.8–8)
NEUTS SEG NFR BLD: 73 % (ref 32–75)
NRBC # BLD: 0 K/UL (ref 0–0.01)
NRBC BLD-RTO: 0 PER 100 WBC
PHENYTOIN SERPL-MCNC: 9.9 UG/ML (ref 10–20)
PLATELET # BLD AUTO: 177 K/UL (ref 150–400)
PMV BLD AUTO: 8.6 FL (ref 8.9–12.9)
POTASSIUM SERPL-SCNC: 4.4 MMOL/L (ref 3.5–5.1)
PROT SERPL-MCNC: 7 G/DL (ref 6.4–8.2)
RBC # BLD AUTO: 4.25 M/UL (ref 3.8–5.2)
SODIUM SERPL-SCNC: 140 MMOL/L (ref 136–145)
TSH SERPL DL<=0.05 MIU/L-ACNC: 1.89 UIU/ML (ref 0.36–3.74)
VIT B12 SERPL-MCNC: >2000 PG/ML (ref 193–986)
WBC # BLD AUTO: 6.8 K/UL (ref 3.6–11)

## 2022-08-05 NOTE — PROGRESS NOTES
6 month follow up for MELINDA 3 ,  pt reports no abnormal spotting or bleeding, pt states no questions or concerns for today's visit    1. Have you been to the ER, urgent care clinic since your last visit? Hospitalized since your last visit?  no    2. Have you seen or consulted any other health care providers outside of the [address] [place] [address] since your last visit? Include any pap smears or colon screening.    no

## 2022-08-08 ENCOUNTER — OFFICE VISIT (OUTPATIENT)
Dept: OBGYN CLINIC | Age: 77
End: 2022-08-08
Payer: MEDICARE

## 2022-08-08 VITALS — BODY MASS INDEX: 29.01 KG/M2 | SYSTOLIC BLOOD PRESSURE: 144 MMHG | WEIGHT: 169 LBS | DIASTOLIC BLOOD PRESSURE: 78 MMHG

## 2022-08-08 DIAGNOSIS — Z01.419 ENCOUNTER FOR GYNECOLOGICAL EXAMINATION WITHOUT ABNORMAL FINDING: Primary | ICD-10-CM

## 2022-08-08 PROCEDURE — G0101 CA SCREEN;PELVIC/BREAST EXAM: HCPCS | Performed by: OBSTETRICS & GYNECOLOGY

## 2022-08-08 NOTE — PROGRESS NOTES
Annual exam    Ashley Bowman is a 76 y.o. postmenopausal female presenting for annual exam. Her main concerns today include none. She is currently caring for her  who is 80. He was recently diagnosed with CHF and she is helping to manage his blood pressure medications. She has four grandchildren. She had a simple vulvectomy in 2021 with Dr. Elissa Santizo due to condylomatous lesion. 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 She has a follow up appointment with Dr. Elissa Santizo tomorrow. She declines a chaperone during the gynecologic exam today. Ob/Gyn Hx:  PMB - None  Menopausal symptoms - none  HRT - none    Health maintenance:  Pap - years ago  Mammo - 6/2021, Bir[facility] 1  Colonoscopy - 8 to 10 years ago.     Past Medical History:   Diagnosis Date    Arrhythmia 1990's    PVC'S    Asthma     Bronchitis     Cancer ([address]     Skin cancer SCC, BCC    Cerebral meningioma ([address] resection 1992    Chronic pain     BACK, KNEES, JOINTS    Cytomegalovirus (HCC)     Nohemi-Danlos syndrome     GERD (gastroesophageal reflux disease)     Osteoarthritis     Other ill-defined conditions(799.89) 1992    brain tumor-benign    Pericarditis, acute 1960'S    Thromboembolus ([address] 1975    PE x2       Past Surgical History:   Procedure Laterality Date    HX ADENOIDECTOMY      HX CATARACT REMOVAL Left 01/24/2022    HX CHOLECYSTECTOMY      HX ENDOSCOPY  1996    HX GI      COLONOSCOPY    HX GYN  1973    ectopic pregnancy    HX GYN  1996 2003    VULVECTOMY    HX GYN  10/22/2021    precancerous area removed from vulva    HX HEENT  1992    removed brain tumor and plate inserted    HX HERNIA REPAIR      bilateral    HX HERNIA REPAIR  02/18/2022    robotic right inguinal hernia repair with mesh by Dr. Margie Tate at [facility] [place]    HX KNEE ARTHROSCOPY      bilateral knee surgery     HX ORTHOPAEDIC Right     BONE TAKEN OUT OF WRIST    HX OTHER SURGICAL      SKIN CA REMOVED FACE AND HEAD    HX TONSILLECTOMY      HX UROLOGICAL  2005 SLING    NEUROLOGICAL PROCEDURE UNLISTED      meningioma    DC COLONOSCOPY FLX DX W/COLLJ SPEC WHEN PFRMD  2011         DC EXCIS INFRATENT MENINGIOMA      DC FOREARM/WRIST SURGERY UNLISTED  2010    Right    DC TREAT ECTOPIC PREG,ABD PREG         Family History   Problem Relation Age of Onset    Heart Attack Father     Other Father         Gi bleed    Heart Disease Father         [place]  74yo    Alcohol abuse Father     OSTEOARTHRITIS Father         gout    Other Mother         Pneumonia    Heart Disease Mother         CHF/pacemaker    OSTEOARTHRITIS Mother     Asthma Mother     Gall Bladder Disease Mother     Hypertension Mother     Breast Cancer Maternal Aunt 79    OSTEOARTHRITIS Paternal Grandfather         Rheumatoid    Anesth Problems Neg Hx        Social History     Socioeconomic History    Marital status:      Spouse name: Not on file    Number of children: Not on file    Years of education: Not on file    Highest education level: Not on file   Occupational History    Not on file   Tobacco Use    Smoking status: Former     Packs/day: 0.00     Years: 10.00     Pack years: 0.00     Types: Cigarettes     Quit date: 1973     Years since quittin.7    Smokeless tobacco: Never    Tobacco comments:     Stopped in    Vaping Use    Vaping Use: Never used   Substance and Sexual Activity    Alcohol use: No     Alcohol/week: 0.0 standard drinks    Drug use: No    Sexual activity: Yes     Partners: Male     Birth control/protection: None     Comment: Too old to worry   Other Topics Concern    Not on file   Social History Narrative    Not on file     Social Determinants of Health     Financial Resource Strain: Not on file   Food Insecurity: Not on file   Transportation Needs: Not on file   Physical Activity: Not on file   Stress: Not on file   Social Connections: Not on file   Intimate Partner Violence: Not on file   Housing Stability: Not on file       Current Outpatient Medications   Medication Sig Dispense Refill    Dilantin Extended 100 mg ER capsule TAKE 3 CAPSULES DAILY 270 Capsule 3    vit A/vit C/vit E/zinc/copper (PRESERVISION AREDS PO) Take 2 Tablets by mouth daily. folic acid (FOLVITE) 984 mcg tablet Take 800 mcg by mouth daily. cyanocobalamin (Vitamin B-12) 500 mcg tablet Take 5,000 mcg by mouth daily. minoxidiL (ROGAINE) 2 % external solution Apply  to affected area two (2) times a day. DROPS      acetaminophen (Tylenol Extra Strength) 500 mg tablet Take  by mouth every six (6) hours as needed for Pain. cholecalciferol (VITAMIN D3) 1,000 unit tablet Take  by mouth daily. omega-3 fatty acids-vitamin e (FISH OIL) 1,000 mg cap Take 1 Capsule by mouth daily. Biotin 2,500 mcg cap Take  by mouth daily. Allergies   Allergen Reactions    Epinephrine Unknown (comments)     \"PASSED OUT, \"IRREGULAR HEART BEAT\"    Sulfa (Sulfonamide Antibiotics) Other (comments)     Kidneys shut down AS CHILD    Ephedrine Palpitations    Nsaids (Non-Steroidal Anti-Inflammatory Drug) Diarrhea     Indigestion, nausea    Advil [Ibuprofen] Other (comments)     Fluid retention. Ampicillin Unknown (comments)     DIARRHEA, INDIGESTION,  Patient screened for any delayed non-IgE-mediated reaction to PCN. Patient notes the following:    No delayed non-IgE-mediated reaction to PCN            Codeine Nausea and Vomiting    Demerol [Meperidine] Nausea and Vomiting    Dilaudid [Hydromorphone (Bulk)] Unknown (comments)     \"SEVERE SHAKING\"    Erythromycin Diarrhea    Levaquin [Levofloxacin] Other (comments)     GI upset    Lidocaine Shortness of Breath     Pt is unsure if she is allergic to lidocaine, pt reports she thinks she received lidocaine w/ epinephrine and had a reaction.       Ciprofloxacin Nausea and Vomiting       Review of Systems - History obtained from the patient, negative except as noted in the HPI  Constitutional: negative for weight loss, fever, night sweats  HEENT: negative for hearing loss, earache, congestion, snoring, sorethroat  CV: negative for chest pain, palpitations, edema  Resp: negative for cough, shortness of breath, wheezing  GI: negative for change in bowel habits, abdominal pain, black or bloody stools  : negative for frequency, dysuria, hematuria, vaginal discharge  MSK: negative for back pain, joint pain, muscle pain  Breast: negative for breast lumps, nipple discharge, galactorrhea  Skin :negative for itching, rash, hives  Neuro: negative for dizziness, headache, confusion, weakness  Psych: negative for anxiety, depression, change in mood  Heme/lymph: negative for bleeding, bruising, pallor    Physical Exam    Visit Vitals  BP (!) 144/78 (BP 1 Location: Right arm, BP Patient Position: Sitting)   Wt 169 lb (76.7 kg)   BMI 29.01 kg/m²       Constitutional  Appearance: well-nourished, well developed, alert, in no acute distress    HENT  Head and Face: appears normal    Neck  Inspection/Palpation: normal appearance, no masses or tenderness  Lymph Nodes: no lymphadenopathy present  Thyroid: gland size normal, nontender, no nodules or masses present on palpation    Chest  Respiratory Effort: non-labored breathing  Auscultation: CTAB, normal breath sounds    Cardiovascular  Heart:   Auscultation: regular rate and rhythm without murmur  Extremities: no peripheral edema    Breasts   Inspection of Breasts: breasts symmetrical, no skin changes, no discharge present, nipple appearance normal, no skin retraction present  Palpation of Breasts and Axillae: no masses present on palpation, no breast tenderness  Axillary Lymph Nodes: no lymphadenopathy present    Gastrointestinal  Abdominal Examination: abdomen non-tender to palpation, normal bowel sounds, no masses present  Liver and spleen: no hepatomegaly present, spleen not palpable  Hernias: no hernias identified    Genitourinary   External Genitalia: normal appearance for age, no discharge present, no tenderness present, no inflammatory lesions present, no masses present, atrophy of UG mucosa, white scar on left vulva consistent with previous vulvectomy, no other abnormalities noted  Vagina: normal vaginal vault without central or paravaginal defects, no discharge present, no inflammatory lesions present, no masses present, +rectocele  Bladder: non-tender to palpation  Urethra: appears normal  Cervix: normal, atrophic  Uterus: normal size, shape and consistency, small, mobile  Adnexa: no adnexal tenderness present, no adnexal masses present  Perineum: perineum within normal limits, no evidence of trauma, no rashes or skin lesions present    Skin  General Inspection: no rash, no lesions identified    Neurologic/Psychiatric  Mental Status:  Orientation: grossly oriented to person, place and time  Mood and Affect: mood normal, affect appropriate      Assessment/Plan:  76 y.o. postmenopausal female presenting for annual exam. Overall doing well. Well woman exam:  Normal gynecologic and breast exams. Healthy habits and lifestyle reviewed. Patient declines STD screening  Up to date on mammogram screening  Up to date on colonoscopy. Up to date on DEXA screening. Discussed that she does not need to continue following from a GYN standpoint unless issues arise. Discussed that if Dr. Val Truong would like us to follow for her history of VIN3, we would be happy to do so or she can continue to follow with him.     Emerita Ornelas MD

## 2022-08-08 NOTE — PROGRESS NOTES
[address] [facility] [address], [address] [facility]  P [phone]  F [phone]    Office Note  Patient ID:  Name:  Janusz Dickinson  MRN:  [id number]  :  1945/77 y.o. Date:  2022      HISTORY OF PRESENT ILLNESS:  Ms. Janusz Dickinson is a 76 y.o.  female who on 10/22/2021 underwent simple vulvectomy for a vulvar lesion. Final pathology consistent with MELINDA-3. Negative margins. Presents today for continued surveillance. Doing well without complaints. Denies vaginal bleeding/discharge, new vulvar complaints, abdominal/pelvic pain, nausea, vomiting, constipation, diarrhea, CP, SOB, hematuria, hematochezia, change in appetite or bowel movements, bloating, fevers, chills, or urinary symptoms. Initial History:  Ms. Janusz Dickinson is a 76 y.o.  postmenopausal female who presents as a new patient from Dr. Evgeny Santizo for a new vulvar lesion. The patient reports that she has had this lesion since . She has had it examined before, but reports it appears larger now. She reports that she keeps messing with it. Denies bleeding or discharge from the lesion. Reports it is more bothersome now. Denies vaginal bleeding/discharge. Pertinent PMH/PSH: h/o PE (at the time of an ectopic pregnancy in her 25s), cerebral meningioma (s/p resection in ), h/o PVCs? Active, no restrictions. Pathology Review:   10/22/2021:  * * *FINAL PATHOLOGIC DIAGNOSIS* * *   <<<<<       Vulva, lesion, 5:00; partial vulvectomy:        High-grade squamous intraepithelial lesion (MELINDA III). Margins are negative for MELINDA III, with high-grade dysplasia focally   approaching one inked and cauterized tissue edge. ROS:  A comprehensive review of systems was negative except for that written in the History of Present Illness.  , 10 point ROS    OB/GYN ROS:  Postmenopausal.     ECOG ndGndrndanddndend:nd nd2nd Problem List:  Patient Active Problem List    Diagnosis Date Noted    B12 deficiency 05/20/2022    Meningioma ([facility] 05/20/2022    Vulvar intraepithelial neoplasia (MELINDA) grade 3 12/13/2021    Arrhythmia     History of pulmonary embolus (PE)     Pericarditis, acute     GERD (gastroesophageal reflux disease)     Cerebral meningioma ([address]     Chest pain 01/22/2016    Abnormal EKG 01/22/2016    Recurrent UTI 04/02/2014    Seizure disorder ([address]     Seizure ([address] 10/23/2009     PMH:  Past Medical History:   Diagnosis Date    Arrhythmia 1990's    PVC'S    Asthma     Bronchitis     Cancer ([address]     Skin cancer SCC, BCC    Cerebral meningioma ([address] resection 1992    Chronic pain     BACK, KNEES, JOINTS    Cytomegalovirus ([address]     Nohemi-Danlos syndrome     GERD (gastroesophageal reflux disease)     Osteoarthritis     Other ill-defined conditions(799.89) 1992    brain tumor-benign    Pericarditis, acute 1960'S    Thromboembolus ([address] 1975    PE x2      PSH:  Past Surgical History:   Procedure Laterality Date    HX ADENOIDECTOMY      HX CATARACT REMOVAL Left 01/24/2022    HX CHOLECYSTECTOMY      HX ENDOSCOPY  1996    HX GI      COLONOSCOPY    HX GYN  1973    ectopic pregnancy    HX GYN  1996 2003    VULVECTOMY    HX GYN  10/22/2021    precancerous area removed from vulva    HX HEENT  1992    removed brain tumor and plate inserted    HX HERNIA REPAIR      bilateral    HX HERNIA REPAIR  02/18/2022    robotic right inguinal hernia repair with mesh by Dr. Nicanor Soria at [facility]    HX KNEE ARTHROSCOPY      bilateral knee surgery     HX ORTHOPAEDIC Right     BONE TAKEN OUT OF WRIST    HX OTHER SURGICAL      SKIN CA REMOVED FACE AND HEAD    HX TONSILLECTOMY      HX UROLOGICAL  2005    SLING    NEUROLOGICAL PROCEDURE UNLISTED  1992    meningioma    IA COLONOSCOPY FLX DX W/KARI Guzman 1980 PFRMD  1/13/2011         IA EXCIS INFRATENT MENINGIOMA      IA FOREARM/WRIST SURGERY UNLISTED  2010    Right    IA TREAT ECTOPIC PREG,ABD PREG        Social History:  Social History     Tobacco Use    Smoking status: Former     Packs/day: 0.00     Years: 10.00     Pack years: 0.00     Types: Cigarettes     Quit date: 1973     Years since quittin.7    Smokeless tobacco: Never    Tobacco comments:     Stopped in    Substance Use Topics    Alcohol use: No     Alcohol/week: 0.0 standard drinks      Family History:  Family History   Problem Relation Age of Onset    Heart Attack Father     Other Father         Gi bleed    Heart Disease Father         MI  74yo    Alcohol abuse Father     OSTEOARTHRITIS Father         gout    Other Mother         Pneumonia    Heart Disease Mother         CHF/pacemaker    OSTEOARTHRITIS Mother     Asthma Mother     Gall Bladder Disease Mother     Hypertension Mother     Breast Cancer Maternal Aunt 79    OSTEOARTHRITIS Paternal Grandfather         Rheumatoid    Anesth Problems Neg Hx       Medications: (reviewed)  Current Outpatient Medications   Medication Sig    Dilantin Extended 100 mg ER capsule TAKE 3 CAPSULES DAILY    vit A/vit C/vit E/zinc/copper (PRESERVISION AREDS PO) Take 2 Tablets by mouth daily. folic acid (FOLVITE) 341 mcg tablet Take 800 mcg by mouth daily. cyanocobalamin (VITAMIN B12) 500 mcg tablet Take 5,000 mcg by mouth daily. minoxidiL (ROGAINE) 2 % external solution Apply  to affected area two (2) times a day. DROPS    acetaminophen (TYLENOL) 500 mg tablet Take  by mouth every six (6) hours as needed for Pain. cholecalciferol (VITAMIN D3) (1000 Units /25 mcg) tablet Take  by mouth daily. omega-3 fatty acids-vitamin e 1,000 mg cap Take 1 Capsule by mouth daily. Biotin 2,500 mcg cap Take  by mouth daily. No current facility-administered medications for this visit.      Allergies: (reviewed)  Allergies   Allergen Reactions    Epinephrine Unknown (comments)     \"PASSED OUT, \"IRREGULAR HEART BEAT\"    Sulfa (Sulfonamide Antibiotics) Other (comments)     Kidneys shut down AS CHILD    Ephedrine Palpitations    Nsaids (Non-Steroidal Anti-Inflammatory Drug) Diarrhea Indigestion, nausea    Advil [Ibuprofen] Other (comments)     Fluid retention. Ampicillin Unknown (comments)     DIARRHEA, INDIGESTION,  Patient screened for any delayed non-IgE-mediated reaction to PCN. Patient notes the following:    No delayed non-IgE-mediated reaction to PCN            Codeine Nausea and Vomiting    Demerol [Meperidine] Nausea and Vomiting    Dilaudid [Hydromorphone (Bulk)] Unknown (comments)     \"SEVERE SHAKING\"    Erythromycin Diarrhea    Levaquin [Levofloxacin] Other (comments)     GI upset    Lidocaine Shortness of Breath     Pt is unsure if she is allergic to lidocaine, pt reports she thinks she received lidocaine w/ epinephrine and had a reaction. Ciprofloxacin Nausea and Vomiting          OBJECTIVE:  Physical Exam:  Visit Vitals  BP (!) 167/80 (BP 1 Location: Left upper arm, BP Patient Position: Sitting)   Pulse 73   Ht 5' 4\" (1.626 m)   Wt 168 lb 6.4 oz (76.4 kg)   BMI 28.91 kg/m²        General: Alert and oriented. No acute distress. Well-nourishedGastrointestinal: soft, non-tender, non-distended, no masses or organomegaly. Well-healed incision. Musculoskeletal: normal gait. No joint tenderness, deformity or swelling. No muscular tenderness. Extremities: extremities normal, atraumatic, no cyanosis or edema. Pelvic: exam chaperoned by nurse. Normal appearing external genitalia. Prior incisions have healed well. No evidence of dysplasia. Vaginal exam deferred. Neuro: Grossly intact. Normal gait and movement. No acute deficit  Skin: No evidence of rashes or skin changes. IMPRESSION/PLAN:    Ms. Elio Goddard is a 76 y.o. female who on 10/22/2021 underwent simple vulvectomy for a vulvar lesion. Final pathology consistent with MELINDA-3. Negative margins.      Problems:     Patient Active Problem List    Diagnosis Date Noted    B12 deficiency 05/20/2022    Meningioma ([facility] [address] 05/20/2022    Vulvar intraepithelial neoplasia (MELINDA) grade 3 12/13/2021    Arrhythmia History of pulmonary embolus (PE)     Pericarditis, acute     GERD (gastroesophageal reflux disease)     C[facility] ([facility]     Chest pain 01/22/2016    Abnormal EKG 01/22/2016    Recurrent UTI 04/02/2014    Seizure disorder ([facility]     Seizure ([facility] 10/23/2009       Reviewed patient's course to date. REBEKA on exam today. RTC in 6 months for continued surveillance. Reviewed precautionary symptoms to return sooner. All questions and concerns were addressed with the patient and she is comfortable with the plan. An electronic signature was used to authenticate this note.      Adalberto Josue MD

## 2022-08-09 ENCOUNTER — OFFICE VISIT (OUTPATIENT)
Dept: GYNECOLOGY | Age: 77
End: 2022-08-09
Payer: MEDICARE

## 2022-08-09 VITALS
WEIGHT: 168.4 LBS | HEART RATE: 73 BPM | BODY MASS INDEX: 28.75 KG/M2 | DIASTOLIC BLOOD PRESSURE: 80 MMHG | SYSTOLIC BLOOD PRESSURE: 167 MMHG | HEIGHT: 64 IN

## 2022-08-09 DIAGNOSIS — D07.1 VULVAR INTRAEPITHELIAL NEOPLASIA (VIN) GRADE 3: Primary | ICD-10-CM

## 2022-08-09 PROCEDURE — G8432 DEP SCR NOT DOC, RNG: HCPCS | Performed by: OBSTETRICS & GYNECOLOGY

## 2022-08-09 PROCEDURE — 1101F PT FALLS ASSESS-DOCD LE1/YR: CPT | Performed by: OBSTETRICS & GYNECOLOGY

## 2022-08-09 PROCEDURE — G8399 PT W/DXA RESULTS DOCUMENT: HCPCS | Performed by: OBSTETRICS & GYNECOLOGY

## 2022-08-09 PROCEDURE — G8427 DOCREV CUR MEDS BY ELIG CLIN: HCPCS | Performed by: OBSTETRICS & GYNECOLOGY

## 2022-08-09 PROCEDURE — G8417 CALC BMI ABV UP PARAM F/U: HCPCS | Performed by: OBSTETRICS & GYNECOLOGY

## 2022-08-09 PROCEDURE — 99214 OFFICE O/P EST MOD 30 MIN: CPT | Performed by: OBSTETRICS & GYNECOLOGY

## 2022-08-09 PROCEDURE — G0463 HOSPITAL OUTPT CLINIC VISIT: HCPCS | Performed by: OBSTETRICS & GYNECOLOGY

## 2022-08-09 PROCEDURE — G8536 NO DOC ELDER MAL SCRN: HCPCS | Performed by: OBSTETRICS & GYNECOLOGY

## 2022-08-09 PROCEDURE — 1123F ACP DISCUSS/DSCN MKR DOCD: CPT | Performed by: OBSTETRICS & GYNECOLOGY

## 2022-08-09 PROCEDURE — 1090F PRES/ABSN URINE INCON ASSESS: CPT | Performed by: OBSTETRICS & GYNECOLOGY

## 2022-09-15 ENCOUNTER — OFFICE VISIT (OUTPATIENT)
Dept: CARDIOLOGY CLINIC | Age: 77
End: 2022-09-15
Payer: MEDICARE

## 2022-09-15 VITALS
RESPIRATION RATE: 18 BRPM | BODY MASS INDEX: 28.51 KG/M2 | HEART RATE: 73 BPM | SYSTOLIC BLOOD PRESSURE: 138 MMHG | OXYGEN SATURATION: 95 % | WEIGHT: 167 LBS | HEIGHT: 64 IN | DIASTOLIC BLOOD PRESSURE: 80 MMHG

## 2022-09-15 DIAGNOSIS — R94.31 ABNORMAL EKG: ICD-10-CM

## 2022-09-15 DIAGNOSIS — R00.2 PALPITATIONS: Primary | ICD-10-CM

## 2022-09-15 DIAGNOSIS — I49.3 PVC (PREMATURE VENTRICULAR CONTRACTION): ICD-10-CM

## 2022-09-15 PROCEDURE — 1090F PRES/ABSN URINE INCON ASSESS: CPT | Performed by: SPECIALIST

## 2022-09-15 PROCEDURE — G8536 NO DOC ELDER MAL SCRN: HCPCS | Performed by: SPECIALIST

## 2022-09-15 PROCEDURE — 1101F PT FALLS ASSESS-DOCD LE1/YR: CPT | Performed by: SPECIALIST

## 2022-09-15 PROCEDURE — G8427 DOCREV CUR MEDS BY ELIG CLIN: HCPCS | Performed by: SPECIALIST

## 2022-09-15 PROCEDURE — G8510 SCR DEP NEG, NO PLAN REQD: HCPCS | Performed by: SPECIALIST

## 2022-09-15 PROCEDURE — 1123F ACP DISCUSS/DSCN MKR DOCD: CPT | Performed by: SPECIALIST

## 2022-09-15 PROCEDURE — G8417 CALC BMI ABV UP PARAM F/U: HCPCS | Performed by: SPECIALIST

## 2022-09-15 PROCEDURE — G8399 PT W/DXA RESULTS DOCUMENT: HCPCS | Performed by: SPECIALIST

## 2022-09-15 PROCEDURE — 99213 OFFICE O/P EST LOW 20 MIN: CPT | Performed by: SPECIALIST

## 2022-09-15 RX ORDER — FLUOROURACIL 50 MG/G
CREAM TOPICAL
COMMUNITY
Start: 2022-08-10 | End: 2022-09-15 | Stop reason: ALTCHOICE

## 2022-09-15 NOTE — PROGRESS NOTES
HISTORY OF PRESENT ILLNESS  Damaris Haynes is a 76 y.o. female     SUMMARY:   Problem List  Date Reviewed: 9/15/2022            Codes Class Noted    B12 deficiency ICD-10-CM: E53.8  ICD-9-CM: 266.2  5/20/2022        Meningioma ([facility] ICD-10-CM: D32.9  ICD-9-CM: 225.2  5/20/2022        Vulvar intraepithelial neoplasia (MELINDA) grade 3 ICD-10-CM: D07.1  ICD-9-CM: 233.32  12/13/2021        Arrhythmia ICD-10-CM: I49.9  ICD-9-CM: 427.9  Unknown    Overview Signed 8/10/2021  1:41 PM by Sergio Silva MD     more frequently this year             History of pulmonary embolus (PE) ICD-10-CM: T18.274  ICD-9-CM: V12.55  Unknown    Overview Signed 8/10/2021  1:41 PM by Sergio Silva MD     PE x2             Pericarditis, acute ICD-10-CM: I30.9  ICD-9-CM: 420.90  Unknown        GERD (gastroesophageal reflux disease) ICD-10-CM: K21.9  ICD-9-CM: 530.81  Unknown        Cerebral meningioma ([facility] ICD-10-CM: D32.0  ICD-9-CM: 225.2  Unknown        Chest pain ICD-10-CM: R07.9  ICD-9-CM: 786.50  1/22/2016        Abnormal EKG ICD-10-CM: R94.31  ICD-9-CM: 794.31  1/22/2016        Recurrent UTI ICD-10-CM: N39.0  ICD-9-CM: 599.0  4/2/2014        Seizure disorder ([facility] ICD-10-CM: G40.909  ICD-9-CM: 345.90  Unknown        Seizure ([facility] ICD-10-CM: R56.9  ICD-9-CM: 780.39  10/23/2009           Current Outpatient Medications on File Prior to Visit   Medication Sig    Dilantin Extended 100 mg ER capsule TAKE 3 CAPSULES DAILY    vit A/vit C/vit E/zinc/copper (PRESERVISION AREDS PO) Take 2 Tablets by mouth daily. folic acid (FOLVITE) 343 mcg tablet Take 800 mcg by mouth daily. cyanocobalamin (VITAMIN B12) 500 mcg tablet Take 5,000 mcg by mouth daily. minoxidiL (ROGAINE) 2 % external solution Apply  to affected area two (2) times a day. DROPS    acetaminophen (TYLENOL) 500 mg tablet Take  by mouth every six (6) hours as needed for Pain. cholecalciferol (VITAMIN D3) (1000 Units /25 mcg) tablet Take  by mouth daily. omega-3 fatty acids-vitamin e 1,000 mg cap Take 1 Capsule by mouth daily. Biotin 2,500 mcg cap Take  by mouth daily. No current facility-administered medications on file prior to visit. CARDIOLOGY STUDIES TO DATE:  1/16 normal stress echo  5/20 holter, rare pacs, occasional pvcs  6/20 event monitor sinus rhythm to sinus bradycardia with occasional premature ventricular and atrial contractions  06/02/20  echo normal lvef, lae, trivial mvp with tr regurg    ·    Chief Complaint   Patient presents with    Follow-up     HPI :  She is doing fairly well. She has had some issues with dependent edema here in the summertime particularly if she sits but she tries to stay active and move around up at their home in [facility]. The only time palpitations really bothers in the evening hours but so far its not interfering with her sleep.   CARDIAC ROS:   negative for chest pain, dyspnea, syncope, orthopnea, paroxysmal nocturnal dyspnea, exertional chest pressure/discomfort, claudication    Family History   Problem Relation Age of Onset    Heart Attack Father     Other Father         Gi bleed    Heart Disease Father         MI  76yo    Alcohol abuse Father     OSTEOARTHRITIS Father         gout    Other Mother         Pneumonia    Heart Disease Mother         CHF/pacemaker    OSTEOARTHRITIS Mother     Asthma Mother     Gall Bladder Disease Mother     Hypertension Mother     Breast Cancer Maternal Aunt 79    OSTEOARTHRITIS Paternal Grandfather         Rheumatoid    Anesth Problems Neg Hx        Past Medical History:   Diagnosis Date    Arrhythmia 1990's    PVC'S    Asthma     Bronchitis     Cancer ([address]     Skin cancer SCC, BCC    Cerebral meningioma ([address] resection 1992    Chronic pain     BACK, KNEES, JOINTS    Cytomegalovirus ([address]     Nohemi-Danlos syndrome     GERD (gastroesophageal reflux disease)     Osteoarthritis     Other ill-defined conditions(799.89) 1992    brain tumor-benign    Pericarditis, acute 1960'S Thromboembolus ([facility] 1975    PE x2       GENERAL ROS:  A comprehensive review of systems was negative except for that written in the HPI.     Visit Vitals  /80 (BP 1 Location: Right arm, BP Patient Position: Sitting, BP Cuff Size: Adult)   Pulse 73   Resp 18   Ht 5' 4\" (1.626 m)   Wt 167 lb (75.8 kg)   SpO2 95%   BMI 28.67 kg/m²       Wt Readings from Last 3 Encounters:   09/15/22 167 lb (75.8 kg)   08/09/22 168 lb 6.4 oz (76.4 kg)   08/08/22 169 lb (76.7 kg)            BP Readings from Last 3 Encounters:   09/15/22 138/80   08/09/22 (!) 167/80   08/08/22 (!) 144/78       PHYSICAL EXAM  General appearance: alert, cooperative, no distress, appears stated age  Neurologic: Alert and oriented X 3  Neck: supple, symmetrical, trachea midline, no adenopathy, no carotid bruit, and no JVD  Lungs: clear to auscultation bilaterally  Heart: regular rate and rhythm, S1, S2 normal, no murmur, click, rub or gallop  Extremities: extremities normal, atraumatic, no cyanosis or edema    Lab Results   Component Value Date/Time    Cholesterol, total 199 06/03/2020 09:21 AM    Cholesterol, total 201 (H) 05/22/2019 10:42 AM    Cholesterol, total 222 (H) 02/02/2016 10:55 AM    Cholesterol, total 219 (H) 04/07/2014 10:24 AM    Cholesterol, total 218 (H) 01/10/2013 03:16 PM    HDL Cholesterol 93 06/03/2020 09:21 AM    HDL Cholesterol 86 05/22/2019 10:42 AM    HDL Cholesterol 106 02/02/2016 10:55 AM    HDL Cholesterol 96 04/07/2014 10:24 AM    HDL Cholesterol 104 01/10/2013 03:16 PM    LDL, calculated 94 06/03/2020 09:21 AM    LDL, calculated 100 (H) 05/22/2019 10:42 AM    LDL, calculated 104 (H) 02/02/2016 10:55 AM    LDL, calculated 112 (H) 04/07/2014 10:24 AM    LDL, calculated 101 (H) 01/10/2013 03:16 PM    Triglyceride 62 06/03/2020 09:21 AM    Triglyceride 73 05/22/2019 10:42 AM    Triglyceride 58 02/02/2016 10:55 AM    Triglyceride 57 04/07/2014 10:24 AM    Triglyceride 64 01/10/2013 03:16 PM    CHOL/HDL Ratio 2.8 11/23/2009 10:44 AM     ASSESSMENT :      She is stable and minimally symptomatic at this point and does not think she wants any medications for palpitations. She needs no cardiac testing this time. current treatment plan is effective, no change in therapy  lab results and schedule of future lab studies reviewed with patient  reviewed diet, exercise and weight control    Encounter Diagnoses   Name Primary? Palpitations Yes    PVC (premature ventricular contraction)     Abnormal EKG      Orders Placed This Encounter    DISCONTD: fluorouraciL (EFUDEX) 5 % chemo cream       Follow-up and Dispositions    Return in about 1 year (around 9/15/2023). Sunny Graf MD  9/15/2022  Please note that this dictation was completed with [employer], the computer voice recognition software. Quite often unanticipated grammatical, syntax, homophones, and other interpretive errors are inadvertently transcribed by the computer software. Please disregard these errors. Please excuse any errors that have escaped final proofreading. Thank you.

## 2023-02-06 NOTE — PROGRESS NOTES
6 month follow up for MELINDA 3 ,  pt reports no abnormal spotting or bleeding, pt states no questions or concerns for today's visit    1. Have you been to the ER, urgent care clinic since your last visit? Hospitalized since your last visit?  no    2. Have you seen or consulted any other health care providers outside of the [address] [place] [address] since your last visit? Include any pap smears or colon screening.    no

## 2023-02-07 ENCOUNTER — OFFICE VISIT (OUTPATIENT)
Dept: GYNECOLOGY | Age: 78
End: 2023-02-07
Payer: MEDICARE

## 2023-02-07 VITALS
HEART RATE: 67 BPM | BODY MASS INDEX: 29.19 KG/M2 | SYSTOLIC BLOOD PRESSURE: 151 MMHG | HEIGHT: 64 IN | WEIGHT: 171 LBS | DIASTOLIC BLOOD PRESSURE: 79 MMHG

## 2023-02-07 DIAGNOSIS — R32 URINARY INCONTINENCE, UNSPECIFIED TYPE: ICD-10-CM

## 2023-02-07 DIAGNOSIS — N81.10 PELVIC ORGAN PROLAPSE QUANTIFICATION STAGE 2 CYSTOCELE: ICD-10-CM

## 2023-02-07 DIAGNOSIS — D07.1 VULVAR INTRAEPITHELIAL NEOPLASIA (VIN) GRADE 3: Primary | ICD-10-CM

## 2023-02-07 PROCEDURE — 1101F PT FALLS ASSESS-DOCD LE1/YR: CPT | Performed by: OBSTETRICS & GYNECOLOGY

## 2023-02-07 PROCEDURE — G8427 DOCREV CUR MEDS BY ELIG CLIN: HCPCS | Performed by: OBSTETRICS & GYNECOLOGY

## 2023-02-07 PROCEDURE — G0463 HOSPITAL OUTPT CLINIC VISIT: HCPCS | Performed by: OBSTETRICS & GYNECOLOGY

## 2023-02-07 PROCEDURE — G8432 DEP SCR NOT DOC, RNG: HCPCS | Performed by: OBSTETRICS & GYNECOLOGY

## 2023-02-07 PROCEDURE — 1090F PRES/ABSN URINE INCON ASSESS: CPT | Performed by: OBSTETRICS & GYNECOLOGY

## 2023-02-07 PROCEDURE — G8399 PT W/DXA RESULTS DOCUMENT: HCPCS | Performed by: OBSTETRICS & GYNECOLOGY

## 2023-02-07 PROCEDURE — 1123F ACP DISCUSS/DSCN MKR DOCD: CPT | Performed by: OBSTETRICS & GYNECOLOGY

## 2023-02-07 PROCEDURE — G8536 NO DOC ELDER MAL SCRN: HCPCS | Performed by: OBSTETRICS & GYNECOLOGY

## 2023-02-07 PROCEDURE — 99213 OFFICE O/P EST LOW 20 MIN: CPT | Performed by: OBSTETRICS & GYNECOLOGY

## 2023-02-07 PROCEDURE — G8417 CALC BMI ABV UP PARAM F/U: HCPCS | Performed by: OBSTETRICS & GYNECOLOGY

## 2023-02-07 NOTE — PROGRESS NOTES
[facility] [address], [address] [place]   [phone]  F [phone]    Office Note  Patient ID:  Name:  Joss Campos  MRN:  [id number]  :  77 y.o. Date:  2023      HISTORY OF PRESENT ILLNESS:  Ms. Joss Campos is a 76 y.o.  female who on 10/22/2021 underwent simple vulvectomy for a vulvar lesion. Final pathology consistent with MELINDA-3. Negative margins. Presents today for continued surveillance. Doing well without complaints. Denies vaginal bleeding/discharge, new vulvar complaints, abdominal/pelvic pain, nausea, vomiting, constipation, diarrhea, CP, SOB, hematuria, hematochezia, change in appetite or bowel movements, bloating, fevers, chills, or urinary symptoms. Initial History:  Ms. Joss Campos is a 76 y.o.  postmenopausal female who presents as a new patient from Dr. Indigo Carias for a new vulvar lesion. The patient reports that she has had this lesion since . She has had it examined before, but reports it appears larger now. She reports that she keeps messing with it. Denies bleeding or discharge from the lesion. Reports it is more bothersome now. Denies vaginal bleeding/discharge. Pertinent PMH/PSH: h/o PE (at the time of an ectopic pregnancy in her 25s), cerebral meningioma (s/p resection in ), h/o PVCs? Active, no restrictions. Pathology Review:   10/22/2021:  * * *FINAL PATHOLOGIC DIAGNOSIS* * *   <<<<<       Vulva, lesion, 5:00; partial vulvectomy:        High-grade squamous intraepithelial lesion (MELINDA III). Margins are negative for MELINDA III, with high-grade dysplasia focally   approaching one inked and cauterized tissue edge. ROS:  A comprehensive review of systems was negative except for that written in the History of Present Illness.  , 10 point ROS    OB/GYN ROS:  Postmenopausal.     ECOG ndGndrndanddndend:nd nd2nd Problem List:  Patient Active Problem List    Diagnosis Date Noted    B12 deficiency 05/20/2022    Meningioma ([facility] [address] 05/20/2022    Vulvar intraepithelial neoplasia (MELINDA) grade 3 12/13/2021    Arrhythmia     History of pulmonary embolus (PE)     Pericarditis, acute     GERD (gastroesophageal reflux disease)     Cerebral meningioma ([address]     Chest pain 01/22/2016    Abnormal EKG 01/22/2016    Recurrent UTI 04/02/2014    Seizure disorder ([address]     Seizure ([address] 10/23/2009     PMH:  Past Medical History:   Diagnosis Date    Arrhythmia 1990's    PVC'S    Asthma     Bronchitis     Cancer ([address]     Skin cancer SCC, BCC    Cerebral meningioma ([address] resection 1992    Chronic pain     BACK, KNEES, JOINTS    Cytomegalovirus ([address]     Nohemi-Danlos syndrome     GERD (gastroesophageal reflux disease)     Osteoarthritis     Other ill-defined conditions(799.89) 1992    brain tumor-benign    Pericarditis, acute 1960'S    Thromboembolus ([address] 1975    PE x2      PSH:  Past Surgical History:   Procedure Laterality Date    HX ADENOIDECTOMY      HX CATARACT REMOVAL Left 01/24/2022    HX CHOLECYSTECTOMY      HX ENDOSCOPY  1996    HX GI      COLONOSCOPY    HX GYN  1973    ectopic pregnancy    HX GYN  1996 2003    VULVECTOMY    HX GYN  10/22/2021    precancerous area removed from vulva    HX HEENT  1992    removed brain tumor and plate inserted    HX HERNIA REPAIR      bilateral    HX HERNIA REPAIR  02/18/2022    robotic right inguinal hernia repair with mesh by Dr. Hernandez Senior at [facility]    HX KNEE ARTHROSCOPY      bilateral knee surgery     HX ORTHOPAEDIC Right     BONE TAKEN OUT OF WRIST    HX OTHER SURGICAL      SKIN CA REMOVED FACE AND HEAD    HX TONSILLECTOMY      HX UROLOGICAL  2005    SLING    AR COLONOSCOPY FLX DX W/COLLJ SPEC WHEN PFRMD  1/13/2011         AR CRNEC EXC VIVIANE INFRATENTOR/POST FOSSA MENINGIOMA      AR TX ECTOPIC PREGNANCY ABDL PREGNANCY      AR UNLISTED NEUROLOGICAL/NEUROMUSCULAR DX [address]  1992    meningioma    AR UNLISTED PROCEDURE FOREARM/WRIST  2010    Right      Social History:  Social History     Tobacco Use    Smoking status: Former     Packs/day: 0.00     Years: 10.00     Pack years: 0.00     Types: Cigarettes     Quit date: 1973     Years since quittin.2    Smokeless tobacco: Never    Tobacco comments:     Stopped in    Substance Use Topics    Alcohol use: No     Alcohol/week: 0.0 standard drinks      Family History:  Family History   Problem Relation Age of Onset    Heart Attack Father     Other Father         Gi bleed    Heart Disease Father         MI  76yo    Alcohol abuse Father     OSTEOARTHRITIS Father         gout    Other Mother         Pneumonia    Heart Disease Mother         CHF/pacemaker    OSTEOARTHRITIS Mother     Asthma Mother     Gall Bladder Disease Mother     Hypertension Mother     Breast Cancer Maternal Aunt 79    OSTEOARTHRITIS Paternal Grandfather         Rheumatoid    Anesth Problems Neg Hx       Medications: (reviewed)  Current Outpatient Medications   Medication Sig    Dilantin Extended 100 mg ER capsule TAKE 3 CAPSULES DAILY    vit A/vit C/vit E/zinc/copper (PRESERVISION AREDS PO) Take 2 Tablets by mouth daily. folic acid (FOLVITE) 056 mcg tablet Take 800 mcg by mouth daily. cyanocobalamin (VITAMIN B12) 500 mcg tablet Take 5,000 mcg by mouth daily. minoxidiL (ROGAINE) 2 % external solution Apply  to affected area two (2) times a day. DROPS    acetaminophen (TYLENOL) 500 mg tablet Take  by mouth every six (6) hours as needed for Pain. cholecalciferol (VITAMIN D3) (1000 Units /25 mcg) tablet Take  by mouth daily. omega-3 fatty acids-vitamin e 1,000 mg cap Take 1 Capsule by mouth daily. Biotin 2,500 mcg cap Take  by mouth daily. No current facility-administered medications for this visit.      Allergies: (reviewed)  Allergies   Allergen Reactions    Epinephrine Unknown (comments)     \"PASSED OUT, \"IRREGULAR HEART BEAT\"    Sulfa (Sulfonamide Antibiotics) Other (comments)     Kidneys shut down AS CHILD    Ephedrine Palpitations    Nsaids (Non-Steroidal Anti-Inflammatory Drug) Diarrhea     Indigestion, nausea    Advil [Ibuprofen] Other (comments)     Fluid retention. Ampicillin Unknown (comments)     DIARRHEA, INDIGESTION,  Patient screened for any delayed non-IgE-mediated reaction to PCN. Patient notes the following:    No delayed non-IgE-mediated reaction to PCN            Codeine Nausea and Vomiting    Demerol [Meperidine] Nausea and Vomiting    Dilaudid [Hydromorphone (Bulk)] Unknown (comments)     \"SEVERE SHAKING\"    Erythromycin Diarrhea    Levaquin [Levofloxacin] Other (comments)     GI upset    Lidocaine Shortness of Breath     Pt is unsure if she is allergic to lidocaine, pt reports she thinks she received lidocaine w/ epinephrine and had a reaction. Ciprofloxacin Nausea and Vomiting          OBJECTIVE:  Physical Exam:  Visit Vitals  BP (!) 151/79 (BP 1 Location: Left upper arm, BP Patient Position: Sitting)   Pulse 67   Ht 5' 4\" (1.626 m)   Wt 171 lb (77.6 kg)   BMI 29.35 kg/m²        General: Alert and oriented. No acute distress. Well-nourishedGastrointestinal: soft, non-tender, non-distended, no masses or organomegaly. Well-healed incision. Musculoskeletal: normal gait. No joint tenderness, deformity or swelling. No muscular tenderness. Extremities: extremities normal, atraumatic, no cyanosis or edema. Pelvic: exam chaperoned by nurse. Normal appearing external genitalia. Prior incisions have healed well. No evidence of dysplasia. Vaginal exam deferred. Neuro: Grossly intact. Normal gait and movement. No acute deficit  Skin: No evidence of rashes or skin changes. IMPRESSION/PLAN:    Ms. Julia Lane is a 76 y.o. female who on 10/22/2021 underwent simple vulvectomy for a vulvar lesion. Final pathology consistent with MELINDA-3. Negative margins.      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 REBEKA on exam today. RTC in 6 months for continued surveillance. Reviewed precautionary symptoms to return sooner. Patient reports issues with urinary incontinence and would like to see someone regarding this issue. Referral to UroGynecology. All questions and concerns were addressed with the patient and she is comfortable with the plan. An electronic signature was used to authenticate this note.      David Ram MD

## 2023-04-28 ENCOUNTER — HOSPITAL ENCOUNTER (OUTPATIENT)
Dept: MAMMOGRAPHY | Age: 78
End: 2023-04-28
Attending: INTERNAL MEDICINE
Payer: MEDICARE

## 2023-04-28 ENCOUNTER — HOSPITAL ENCOUNTER (OUTPATIENT)
Dept: BONE DENSITY | Age: 78
End: 2023-04-28
Attending: INTERNAL MEDICINE
Payer: MEDICARE

## 2023-04-28 DIAGNOSIS — Z12.31 SCREENING MAMMOGRAM FOR BREAST CANCER: ICD-10-CM

## 2023-04-28 DIAGNOSIS — M81.0 AGE-RELATED OSTEOPOROSIS WITHOUT CURRENT PATHOLOGICAL FRACTURE: ICD-10-CM

## 2023-04-28 PROCEDURE — 77063 BREAST TOMOSYNTHESIS BI: CPT

## 2023-04-28 PROCEDURE — 77080 DXA BONE DENSITY AXIAL: CPT

## 2023-05-02 ENCOUNTER — OFFICE VISIT (OUTPATIENT)
Dept: INTERNAL MEDICINE CLINIC | Age: 78
End: 2023-05-02
Payer: MEDICARE

## 2023-05-02 VITALS
DIASTOLIC BLOOD PRESSURE: 78 MMHG | HEIGHT: 64 IN | WEIGHT: 162 LBS | TEMPERATURE: 97.8 F | HEART RATE: 75 BPM | OXYGEN SATURATION: 97 % | SYSTOLIC BLOOD PRESSURE: 138 MMHG | RESPIRATION RATE: 12 BRPM | BODY MASS INDEX: 27.66 KG/M2

## 2023-05-02 DIAGNOSIS — E53.8 B12 DEFICIENCY: ICD-10-CM

## 2023-05-02 DIAGNOSIS — Z00.00 MEDICARE ANNUAL WELLNESS VISIT, SUBSEQUENT: Primary | ICD-10-CM

## 2023-05-02 DIAGNOSIS — E78.2 MIXED HYPERLIPIDEMIA: ICD-10-CM

## 2023-05-02 DIAGNOSIS — D32.9 MENINGIOMA (HCC): ICD-10-CM

## 2023-05-02 DIAGNOSIS — R56.9 SEIZURE (HCC): ICD-10-CM

## 2023-05-02 DIAGNOSIS — N39.0 RECURRENT UTI: ICD-10-CM

## 2023-05-02 DIAGNOSIS — M81.0 AGE-RELATED OSTEOPOROSIS WITHOUT CURRENT PATHOLOGICAL FRACTURE: ICD-10-CM

## 2023-05-02 DIAGNOSIS — K21.9 GASTROESOPHAGEAL REFLUX DISEASE WITHOUT ESOPHAGITIS: ICD-10-CM

## 2023-05-02 DIAGNOSIS — I49.9 CARDIAC ARRHYTHMIA, UNSPECIFIED CARDIAC ARRHYTHMIA TYPE: ICD-10-CM

## 2023-05-02 PROCEDURE — 1090F PRES/ABSN URINE INCON ASSESS: CPT | Performed by: INTERNAL MEDICINE

## 2023-05-02 PROCEDURE — G0439 PPPS, SUBSEQ VISIT: HCPCS | Performed by: INTERNAL MEDICINE

## 2023-05-02 PROCEDURE — G8536 NO DOC ELDER MAL SCRN: HCPCS | Performed by: INTERNAL MEDICINE

## 2023-05-02 PROCEDURE — G8417 CALC BMI ABV UP PARAM F/U: HCPCS | Performed by: INTERNAL MEDICINE

## 2023-05-02 PROCEDURE — 99214 OFFICE O/P EST MOD 30 MIN: CPT | Performed by: INTERNAL MEDICINE

## 2023-05-02 PROCEDURE — G8427 DOCREV CUR MEDS BY ELIG CLIN: HCPCS | Performed by: INTERNAL MEDICINE

## 2023-05-02 PROCEDURE — G0463 HOSPITAL OUTPT CLINIC VISIT: HCPCS | Performed by: INTERNAL MEDICINE

## 2023-05-02 PROCEDURE — 1101F PT FALLS ASSESS-DOCD LE1/YR: CPT | Performed by: INTERNAL MEDICINE

## 2023-05-02 PROCEDURE — G8510 SCR DEP NEG, NO PLAN REQD: HCPCS | Performed by: INTERNAL MEDICINE

## 2023-05-02 RX ORDER — DICLOFENAC SODIUM 10 MG/G
GEL TOPICAL AS NEEDED
COMMUNITY

## 2023-05-02 RX ORDER — VIBEGRON 75 MG/1
TABLET, FILM COATED ORAL DAILY
COMMUNITY

## 2023-05-02 RX ORDER — LORATADINE 10 MG/1
10 TABLET ORAL
COMMUNITY

## 2023-05-04 LAB
25(OH)D3 SERPL-MCNC: 32.7 NG/ML (ref 30–100)
ALBUMIN SERPL-MCNC: 4 G/DL (ref 3.5–5)
ALBUMIN/GLOB SERPL: 1.3 (ref 1.1–2.2)
ALP SERPL-CCNC: 164 U/L (ref 45–117)
ALT SERPL-CCNC: 20 U/L (ref 12–78)
ANION GAP SERPL CALC-SCNC: 3 MMOL/L (ref 5–15)
AST SERPL-CCNC: 21 U/L (ref 15–37)
BASOPHILS # BLD: 0 K/UL (ref 0–0.1)
BASOPHILS NFR BLD: 1 % (ref 0–1)
BILIRUB SERPL-MCNC: 0.3 MG/DL (ref 0.2–1)
BUN SERPL-MCNC: 11 MG/DL (ref 6–20)
BUN/CREAT SERPL: 25 (ref 12–20)
CALCIUM SERPL-MCNC: 9.7 MG/DL (ref 8.5–10.1)
CHLORIDE SERPL-SCNC: 108 MMOL/L (ref 97–108)
CHOLEST SERPL-MCNC: 205 MG/DL
CO2 SERPL-SCNC: 29 MMOL/L (ref 21–32)
CREAT SERPL-MCNC: 0.44 MG/DL (ref 0.55–1.02)
DIFFERENTIAL METHOD BLD: ABNORMAL
EOSINOPHIL # BLD: 0.1 K/UL (ref 0–0.4)
EOSINOPHIL NFR BLD: 2 % (ref 0–7)
ERYTHROCYTE [DISTWIDTH] IN BLOOD BY AUTOMATED COUNT: 13.1 % (ref 11.5–14.5)
GLOBULIN SER CALC-MCNC: 3.2 G/DL (ref 2–4)
GLUCOSE SERPL-MCNC: 99 MG/DL (ref 65–100)
HCT VFR BLD AUTO: 41.3 % (ref 35–47)
HDLC SERPL-MCNC: 85 MG/DL
HDLC SERPL: 2.4 (ref 0–5)
HGB BLD-MCNC: 13.1 G/DL (ref 11.5–16)
IMM GRANULOCYTES # BLD AUTO: 0 K/UL (ref 0–0.04)
IMM GRANULOCYTES NFR BLD AUTO: 0 % (ref 0–0.5)
LDLC SERPL CALC-MCNC: 108 MG/DL (ref 0–100)
LYMPHOCYTES # BLD: 1.3 K/UL (ref 0.8–3.5)
LYMPHOCYTES NFR BLD: 27 % (ref 12–49)
MCH RBC QN AUTO: 30 PG (ref 26–34)
MCHC RBC AUTO-ENTMCNC: 31.7 G/DL (ref 30–36.5)
MCV RBC AUTO: 94.5 FL (ref 80–99)
MONOCYTES # BLD: 0.4 K/UL (ref 0–1)
MONOCYTES NFR BLD: 9 % (ref 5–13)
NEUTS SEG # BLD: 2.9 K/UL (ref 1.8–8)
NEUTS SEG NFR BLD: 61 % (ref 32–75)
NRBC # BLD: 0 K/UL (ref 0–0.01)
NRBC BLD-RTO: 0 PER 100 WBC
PHENYTOIN SERPL-MCNC: 11.6 UG/ML (ref 10–20)
PLATELET # BLD AUTO: 148 K/UL (ref 150–400)
PMV BLD AUTO: 9.6 FL (ref 8.9–12.9)
POTASSIUM SERPL-SCNC: 4 MMOL/L (ref 3.5–5.1)
PROT SERPL-MCNC: 7.2 G/DL (ref 6.4–8.2)
RBC # BLD AUTO: 4.37 M/UL (ref 3.8–5.2)
SODIUM SERPL-SCNC: 140 MMOL/L (ref 136–145)
TRIGL SERPL-MCNC: 60 MG/DL (ref ?–150)
TSH SERPL DL<=0.05 MIU/L-ACNC: 1.82 UIU/ML (ref 0.36–3.74)
VIT B12 SERPL-MCNC: >2000 PG/ML (ref 193–986)
VLDLC SERPL CALC-MCNC: 12 MG/DL
WBC # BLD AUTO: 4.8 K/UL (ref 3.6–11)

## 2023-05-23 ENCOUNTER — TELEPHONE (OUTPATIENT)
Age: 78
End: 2023-05-23

## 2023-05-23 NOTE — TELEPHONE ENCOUNTER
Reason for call:  TC from pt. Pt id verified. Pt states Dr. Raúl Zuleta told her that her bone scan showed some osteoporosis and wanted her to start on a medication for it. Pt states she is in [place] for the Summer but will do whatever Dr. Raúl Zuleta feels she needs to take for this problem.  Pt request med be called into below pharmacy:     [facility] [address], [address] [phone] [facility] [phone]    Pt also request to talk to nurse if possible re this request.     Is this a new problem: Yes    Date of last appointment:  5/2/2023     Can we respond via [employer]: No    Best call back number: [phone]

## 2023-05-24 RX ORDER — RISEDRONATE SODIUM 150 MG/1
150 TABLET, FILM COATED ORAL
Qty: 3 TABLET | Refills: 3 | Status: SHIPPED | OUTPATIENT
Start: 2023-05-24

## 2023-05-24 NOTE — TELEPHONE ENCOUNTER
Returned call to pt she states she is having trouble logging into [facility] to send messages - she will call [employer] for assistance. Advised JUDY has sent osteoporosis med to mail order - she voiced understanding.

## 2023-08-09 RX ORDER — PHENYTOIN SODIUM 100 MG/1
CAPSULE, EXTENDED RELEASE ORAL
Qty: 270 CAPSULE | Refills: 3 | Status: SHIPPED | OUTPATIENT
Start: 2023-08-09

## 2023-12-01 ENCOUNTER — OFFICE VISIT (OUTPATIENT)
Age: 78
End: 2023-12-01
Payer: MEDICARE

## 2023-12-01 VITALS
DIASTOLIC BLOOD PRESSURE: 78 MMHG | SYSTOLIC BLOOD PRESSURE: 138 MMHG | WEIGHT: 158 LBS | OXYGEN SATURATION: 98 % | BODY MASS INDEX: 26.98 KG/M2 | HEIGHT: 64 IN | HEART RATE: 65 BPM

## 2023-12-01 DIAGNOSIS — I49.3 VENTRICULAR PREMATURE DEPOLARIZATION: ICD-10-CM

## 2023-12-01 DIAGNOSIS — R00.2 PALPITATIONS: Primary | ICD-10-CM

## 2023-12-01 DIAGNOSIS — R94.31 ABNORMAL ELECTROCARDIOGRAM (ECG) (EKG): ICD-10-CM

## 2023-12-01 PROCEDURE — 1036F TOBACCO NON-USER: CPT | Performed by: SPECIALIST

## 2023-12-01 PROCEDURE — G8419 CALC BMI OUT NRM PARAM NOF/U: HCPCS | Performed by: SPECIALIST

## 2023-12-01 PROCEDURE — G8427 DOCREV CUR MEDS BY ELIG CLIN: HCPCS | Performed by: SPECIALIST

## 2023-12-01 PROCEDURE — 99213 OFFICE O/P EST LOW 20 MIN: CPT | Performed by: SPECIALIST

## 2023-12-01 PROCEDURE — G8399 PT W/DXA RESULTS DOCUMENT: HCPCS | Performed by: SPECIALIST

## 2023-12-01 PROCEDURE — 1090F PRES/ABSN URINE INCON ASSESS: CPT | Performed by: SPECIALIST

## 2023-12-01 PROCEDURE — 1123F ACP DISCUSS/DSCN MKR DOCD: CPT | Performed by: SPECIALIST

## 2023-12-01 PROCEDURE — G8484 FLU IMMUNIZE NO ADMIN: HCPCS | Performed by: SPECIALIST

## 2023-12-01 RX ORDER — DICLOFENAC SODIUM 75 MG/1
75 TABLET, DELAYED RELEASE ORAL PRN
COMMUNITY
Start: 2022-12-07

## 2023-12-01 RX ORDER — VIBEGRON 75 MG/1
TABLET, FILM COATED ORAL DAILY
COMMUNITY

## 2023-12-01 ASSESSMENT — PATIENT HEALTH QUESTIONNAIRE - PHQ9
2. FEELING DOWN, DEPRESSED OR HOPELESS: 0
SUM OF ALL RESPONSES TO PHQ QUESTIONS 1-9: 0
SUM OF ALL RESPONSES TO PHQ QUESTIONS 1-9: 0
SUM OF ALL RESPONSES TO PHQ9 QUESTIONS 1 & 2: 0
SUM OF ALL RESPONSES TO PHQ QUESTIONS 1-9: 0
SUM OF ALL RESPONSES TO PHQ QUESTIONS 1-9: 0
1. LITTLE INTEREST OR PLEASURE IN DOING THINGS: 0

## 2023-12-01 NOTE — PROGRESS NOTES
HISTORY OF PRESENT ILLNESS  Jennie Elias is a 78 y.o. female     SUMMARY:   Patient Active Problem List   Diagnosis    Cerebral meningioma ([address])    GERD (gastroesophageal reflux disease)    Recurrent UTI    Seizure disorder (HCC)    Chest pain    Abnormal EKG    History of pulmonary embolus (PE)    Arrhythmia    Seizure (HCC)    Vulvar intraepithelial neoplasia (AIDE) grade 3    Pericarditis, acute    B12 deficiency    Meningioma (HCC)            CARDIOLOGY STUDIES TO DATE:   normal stress echo   holter, rare pacs, occasional pvcs   event monitor sinus rhythm to sinus bradycardia with occasional premature ventricular and atrial contractions  20  echo normal lvef, lae, trivial mvp with tr regurg    Chief Complaint   Patient presents with    Annual Exam    Palpitations    Dizziness       HPI :  She is doing well with only very rare palpitations which do not bother her at all. Her  who is also a patient of mine  in hospice in September and she is trying to get things rearranged and sorted out. She is active but not exercising.     CARDIAC ROS:   negative for chest pain, claudication, dyspnea, lower extremity edema, orthopnea, paroxysmal nocturnal dyspnea, and syncope    Family History   Problem Relation Age of Onset    Anesth Problems Neg Hx     Other Father         Gi bleed    Heart Disease Father         heart attack    Alcohol Abuse Father     Osteoarthritis Father         gout    Heart Attack Father     Heart Disease Mother         Pacemaker    Osteoarthritis Mother         osteoarthritis , osteoporosis    Asthma Mother     Gall Bladder Disease Mother     Hypertension Mother     Breast Cancer Maternal Aunt 79    Other Mother         Pneumonia       Past Medical History:   Diagnosis Date    Arrhythmia     PVC'S    Asthma     Bronchitis     Cancer ([address])     Skin cancer SCC, BCC    Cerebral meningioma ([address]) resection     Chronic pain     BACK, KNEES, JOINTS

## 2023-12-04 NOTE — PROGRESS NOTES
6 month follow up for CIS of vulva ,  pt reports no abnormal spotting or bleeding, pt states no questions or concerns for today's visit    1. Have you been to the ER, urgent care clinic since your last visit? Hospitalized since your last visit?  no    2. Have you seen or consulted any other health care providers outside of the [address] [place] [address] since your last visit? Include any pap smears or colon screening.    no

## 2023-12-05 ENCOUNTER — OFFICE VISIT (OUTPATIENT)
Age: 78
End: 2023-12-05
Payer: MEDICARE

## 2023-12-05 VITALS
SYSTOLIC BLOOD PRESSURE: 167 MMHG | HEART RATE: 73 BPM | DIASTOLIC BLOOD PRESSURE: 78 MMHG | HEIGHT: 64 IN | BODY MASS INDEX: 26.15 KG/M2 | WEIGHT: 153.2 LBS

## 2023-12-05 DIAGNOSIS — D07.1 VULVAR INTRAEPITHELIAL NEOPLASIA (VIN) GRADE 3: Primary | ICD-10-CM

## 2023-12-05 PROCEDURE — G8484 FLU IMMUNIZE NO ADMIN: HCPCS | Performed by: OBSTETRICS & GYNECOLOGY

## 2023-12-05 PROCEDURE — 1090F PRES/ABSN URINE INCON ASSESS: CPT | Performed by: OBSTETRICS & GYNECOLOGY

## 2023-12-05 PROCEDURE — G8427 DOCREV CUR MEDS BY ELIG CLIN: HCPCS | Performed by: OBSTETRICS & GYNECOLOGY

## 2023-12-05 PROCEDURE — 99212 OFFICE O/P EST SF 10 MIN: CPT | Performed by: OBSTETRICS & GYNECOLOGY

## 2023-12-05 PROCEDURE — G8399 PT W/DXA RESULTS DOCUMENT: HCPCS | Performed by: OBSTETRICS & GYNECOLOGY

## 2023-12-05 PROCEDURE — 1123F ACP DISCUSS/DSCN MKR DOCD: CPT | Performed by: OBSTETRICS & GYNECOLOGY

## 2023-12-05 PROCEDURE — G8419 CALC BMI OUT NRM PARAM NOF/U: HCPCS | Performed by: OBSTETRICS & GYNECOLOGY

## 2023-12-05 PROCEDURE — 1036F TOBACCO NON-USER: CPT | Performed by: OBSTETRICS & GYNECOLOGY

## 2023-12-05 NOTE — PROGRESS NOTES
[address] [place] [address], [facility] Conchita Coronavard  P [phone]  F [phone]    Office Note  Patient ID:  Name:  Celena Jasso  MRN:  [id number]  :  1945/77 y.o. Date:  2023      HISTORY OF PRESENT ILLNESS:  Ms. Celena Jasso is a 78 y.o.    female who on 10/22/2021 underwent simple vulvectomy for a vulvar lesion. Final pathology consistent with AIDE-3. Negative margins. Presents today for continued surveillance. Doing well without complaints. Denies vaginal bleeding/discharge, new vulvar complaints, abdominal/pelvic pain, nausea, vomiting, constipation, diarrhea, CP, SOB, hematuria, hematochezia, change in appetite or bowel movements, bloating, fevers, chills, or urinary symptoms. Initial History:  Ms. Celena Jasso is a 76 y.o.  postmenopausal female who presents as a new patient from Dr. Georgi Woodward for a new vulvar lesion. The patient reports that she has had this lesion since . She has had it examined before, but reports it appears larger now. She reports that she keeps messing with it. Denies bleeding or discharge from the lesion. Reports it is more bothersome now. Denies vaginal bleeding/discharge. Pertinent PMH/PSH: h/o PE (at the time of an ectopic pregnancy in her 25s), cerebral meningioma (s/p resection in ), h/o PVCs? Active, no restrictions. Pathology Review:   10/22/2021:  * * *FINAL PATHOLOGIC DIAGNOSIS* * *   <<<<<       Vulva, lesion, 5:00; partial vulvectomy:        High-grade squamous intraepithelial lesion (AIDE III). Margins are negative for AIDE III, with high-grade dysplasia focally   approaching one inked and cauterized tissue edge. ROS:  A comprehensive review of systems was negative except for that written in the History of Present Illness.  , 10 point ROS    OB/GYN ROS:  Postmenopausal.     ECOG ndGndrndanddndend:nd nd2nd Patient Active Problem List    Diagnosis Date Noted    B12 deficiency 2022

## 2024-03-14 ENCOUNTER — TELEPHONE (OUTPATIENT)
Age: 79
End: 2024-03-14

## 2024-03-14 RX ORDER — RISEDRONATE SODIUM 150 MG/1
150 TABLET, FILM COATED ORAL
Qty: 3 TABLET | Refills: 3 | Status: SHIPPED | OUTPATIENT
Start: 2024-03-14

## 2024-03-14 NOTE — TELEPHONE ENCOUNTER
Last Appointment with Dr. Aquilino Alex:  5/2/2023     Future Appointments   Date Time Provider Department Center   5/2/2024  1:15 PM Aquilino Alex MD [facility]   12/2/2024  2:00 PM Pñea Gross III, MD [facility]

## 2024-03-14 NOTE — TELEPHONE ENCOUNTER
----- Message from Elise Bautista sent at 3/14/2024  3:41 PM EDT -----  Subject: Message to Provider    QUESTIONS  Information for Provider? Olive from [facility] called to say the Pt   would like to set up express scripts for Risedronate Sodium 150 MG TABS 90   days supply, with up to 3 refills. Office can escribe to [address] [place] [id number] or fax to [phone]  ---------------------------------------------------------------------------  --------------  CALL BACK INFO  [phone]; Do not leave any message, patient will call back for answer  ---------------------------------------------------------------------------  --------------  SCRIPT ANSWERS  Relationship to Patient? Covered Entity  Covered Entity Type? Pharmacy?  Representative Name? Olive

## 2024-05-01 SDOH — ECONOMIC STABILITY: HOUSING INSECURITY
IN THE LAST 12 MONTHS, WAS THERE A TIME WHEN YOU DID NOT HAVE A STEADY PLACE TO SLEEP OR SLEPT IN A SHELTER (INCLUDING NOW)?: NO

## 2024-05-01 SDOH — ECONOMIC STABILITY: FOOD INSECURITY: WITHIN THE PAST 12 MONTHS, THE FOOD YOU BOUGHT JUST DIDN'T LAST AND YOU DIDN'T HAVE MONEY TO GET MORE.: NEVER TRUE

## 2024-05-01 SDOH — ECONOMIC STABILITY: TRANSPORTATION INSECURITY
IN THE PAST 12 MONTHS, HAS LACK OF TRANSPORTATION KEPT YOU FROM MEETINGS, WORK, OR FROM GETTING THINGS NEEDED FOR DAILY LIVING?: NO

## 2024-05-01 SDOH — HEALTH STABILITY: PHYSICAL HEALTH: ON AVERAGE, HOW MANY MINUTES DO YOU ENGAGE IN EXERCISE AT THIS LEVEL?: 30 MIN

## 2024-05-01 SDOH — ECONOMIC STABILITY: FOOD INSECURITY: WITHIN THE PAST 12 MONTHS, YOU WORRIED THAT YOUR FOOD WOULD RUN OUT BEFORE YOU GOT MONEY TO BUY MORE.: NEVER TRUE

## 2024-05-01 SDOH — HEALTH STABILITY: PHYSICAL HEALTH: ON AVERAGE, HOW MANY DAYS PER WEEK DO YOU ENGAGE IN MODERATE TO STRENUOUS EXERCISE (LIKE A BRISK WALK)?: 3 DAYS

## 2024-05-01 SDOH — ECONOMIC STABILITY: INCOME INSECURITY: HOW HARD IS IT FOR YOU TO PAY FOR THE VERY BASICS LIKE FOOD, HOUSING, MEDICAL CARE, AND HEATING?: NOT HARD AT ALL

## 2024-05-01 ASSESSMENT — LIFESTYLE VARIABLES
HOW OFTEN DO YOU HAVE SIX OR MORE DRINKS ON ONE OCCASION: 1
HOW MANY STANDARD DRINKS CONTAINING ALCOHOL DO YOU HAVE ON A TYPICAL DAY: 0
HOW OFTEN DO YOU HAVE A DRINK CONTAINING ALCOHOL: NEVER
HOW MANY STANDARD DRINKS CONTAINING ALCOHOL DO YOU HAVE ON A TYPICAL DAY: PATIENT DOES NOT DRINK
HOW OFTEN DO YOU HAVE A DRINK CONTAINING ALCOHOL: 1

## 2024-05-01 ASSESSMENT — PATIENT HEALTH QUESTIONNAIRE - PHQ9
2. FEELING DOWN, DEPRESSED OR HOPELESS: NOT AT ALL
1. LITTLE INTEREST OR PLEASURE IN DOING THINGS: NOT AT ALL
SUM OF ALL RESPONSES TO PHQ QUESTIONS 1-9: 0
SUM OF ALL RESPONSES TO PHQ QUESTIONS 1-9: 0
SUM OF ALL RESPONSES TO PHQ9 QUESTIONS 1 & 2: 0
SUM OF ALL RESPONSES TO PHQ QUESTIONS 1-9: 0
SUM OF ALL RESPONSES TO PHQ QUESTIONS 1-9: 0

## 2024-05-02 ENCOUNTER — OFFICE VISIT (OUTPATIENT)
Age: 79
End: 2024-05-02
Payer: MEDICARE

## 2024-05-02 VITALS
HEART RATE: 81 BPM | SYSTOLIC BLOOD PRESSURE: 138 MMHG | OXYGEN SATURATION: 95 % | DIASTOLIC BLOOD PRESSURE: 82 MMHG | RESPIRATION RATE: 15 BRPM | WEIGHT: 163.6 LBS | BODY MASS INDEX: 27.93 KG/M2 | HEIGHT: 64 IN | TEMPERATURE: 98.4 F

## 2024-05-02 DIAGNOSIS — R26.9 GAIT ABNORMALITY: ICD-10-CM

## 2024-05-02 DIAGNOSIS — Z12.31 ENCOUNTER FOR SCREENING MAMMOGRAM FOR MALIGNANT NEOPLASM OF BREAST: ICD-10-CM

## 2024-05-02 DIAGNOSIS — Z00.00 MEDICARE ANNUAL WELLNESS VISIT, SUBSEQUENT: Primary | ICD-10-CM

## 2024-05-02 DIAGNOSIS — M17.11 PRIMARY OSTEOARTHRITIS OF RIGHT KNEE: ICD-10-CM

## 2024-05-02 DIAGNOSIS — R56.1 POST-TRAUMATIC SEIZURES (HCC): ICD-10-CM

## 2024-05-02 DIAGNOSIS — E78.2 MIXED HYPERLIPIDEMIA: ICD-10-CM

## 2024-05-02 DIAGNOSIS — K21.9 GASTRO-ESOPHAGEAL REFLUX DISEASE WITHOUT ESOPHAGITIS: ICD-10-CM

## 2024-05-02 DIAGNOSIS — I49.9 CARDIAC ARRHYTHMIA, UNSPECIFIED CARDIAC ARRHYTHMIA TYPE: ICD-10-CM

## 2024-05-02 DIAGNOSIS — D32.9 BENIGN NEOPLASM OF MENINGES, UNSPECIFIED (HCC): ICD-10-CM

## 2024-05-02 PROCEDURE — G8419 CALC BMI OUT NRM PARAM NOF/U: HCPCS | Performed by: INTERNAL MEDICINE

## 2024-05-02 PROCEDURE — 1090F PRES/ABSN URINE INCON ASSESS: CPT | Performed by: INTERNAL MEDICINE

## 2024-05-02 PROCEDURE — G0439 PPPS, SUBSEQ VISIT: HCPCS | Performed by: INTERNAL MEDICINE

## 2024-05-02 PROCEDURE — G8399 PT W/DXA RESULTS DOCUMENT: HCPCS | Performed by: INTERNAL MEDICINE

## 2024-05-02 PROCEDURE — G8427 DOCREV CUR MEDS BY ELIG CLIN: HCPCS | Performed by: INTERNAL MEDICINE

## 2024-05-02 PROCEDURE — 99214 OFFICE O/P EST MOD 30 MIN: CPT | Performed by: INTERNAL MEDICINE

## 2024-05-02 PROCEDURE — 1036F TOBACCO NON-USER: CPT | Performed by: INTERNAL MEDICINE

## 2024-05-02 PROCEDURE — 1123F ACP DISCUSS/DSCN MKR DOCD: CPT | Performed by: INTERNAL MEDICINE

## 2024-05-02 NOTE — PROGRESS NOTES
ordered.    Positive Risk Factor Screenings with Interventions:    Fall Risk:  Do you feel unsteady or are you worried about falling? : (!) yes  2 or more falls in past year?: no  Fall with injury in past year?: (!) yes     Interventions:    Reviewed medications, home hazards, visual acuity, and co-morbidities that can increase risk for falls  See AVS for additional education material                 Safety:  Do you have any tripping hazards - loose or unsecured carpets or rugs?: (!) Yes  Interventions:  See AVS for additional education material                   Objective   Vitals:    05/02/24 1312 05/02/24 1335   BP: (!) 151/76 138/82   Pulse: 81    Resp: 15    Temp: 98.4 °F (36.9 °C)    SpO2: 95%    Weight: 74.2 kg (163 lb 9.6 oz)    Height: 1.626 m (5' 4\")       Body mass index is 28.08 kg/m².        General Appearance: alert and oriented to person, place and time, well-developed and well-nourished, in no acute distress  Head: normocephalic and atraumatic.  Well-healed surgical scars and changes in the left frontal scalp.  ENT: tympanic membrane, external ear and ear canal normal bilaterally, oropharynx clear and moist with normal mucous membranes  Neck: neck supple and non tender without mass, no thyromegaly or thyroid nodules, no cervical lymphadenopathy   Pulmonary/Chest: clear to auscultation bilaterally- no wheezes, rales or rhonchi, normal air movement, no respiratory distress  Cardiovascular: normal rate, normal S1 and S2, no gallops, intact distal pulses, and no carotid bruits  Abdomen: soft, non-tender, non-distended, normal bowel sounds, no masses or organomegaly  Extremities: no cyanosis and no clubbing  Musculoskeletal: normal range of motion, no joint swelling, deformity or tenderness  Neurologic: gait and coordination normal and speech normal       Allergies   Allergen Reactions    Epinephrine      Other reaction(s): Unknown (comments)  \"PASSED OUT, \"IRREGULAR HEART BEAT\"    Lidocaine Shortness Of

## 2024-05-08 DIAGNOSIS — E78.2 MIXED HYPERLIPIDEMIA: ICD-10-CM

## 2024-05-08 DIAGNOSIS — R56.1 POST-TRAUMATIC SEIZURES (HCC): ICD-10-CM

## 2024-05-09 LAB
ALBUMIN SERPL-MCNC: 3.7 G/DL (ref 3.5–5)
ALBUMIN/GLOB SERPL: 1.1 (ref 1.1–2.2)
ALP SERPL-CCNC: 143 U/L (ref 45–117)
ALT SERPL-CCNC: 17 U/L (ref 12–78)
ANION GAP SERPL CALC-SCNC: 3 MMOL/L (ref 5–15)
AST SERPL-CCNC: 19 U/L (ref 15–37)
BASOPHILS # BLD: 0 K/UL (ref 0–0.1)
BASOPHILS NFR BLD: 0 % (ref 0–1)
BILIRUB SERPL-MCNC: 0.4 MG/DL (ref 0.2–1)
BUN SERPL-MCNC: 14 MG/DL (ref 6–20)
BUN/CREAT SERPL: 27 (ref 12–20)
CALCIUM SERPL-MCNC: 9.9 MG/DL (ref 8.5–10.1)
CHLORIDE SERPL-SCNC: 111 MMOL/L (ref 97–108)
CHOLEST SERPL-MCNC: 208 MG/DL
CO2 SERPL-SCNC: 29 MMOL/L (ref 21–32)
CREAT SERPL-MCNC: 0.51 MG/DL (ref 0.55–1.02)
DIFFERENTIAL METHOD BLD: NORMAL
EOSINOPHIL # BLD: 0.1 K/UL (ref 0–0.4)
EOSINOPHIL NFR BLD: 2 % (ref 0–7)
ERYTHROCYTE [DISTWIDTH] IN BLOOD BY AUTOMATED COUNT: 13.1 % (ref 11.5–14.5)
GLOBULIN SER CALC-MCNC: 3.5 G/DL (ref 2–4)
GLUCOSE SERPL-MCNC: 104 MG/DL (ref 65–100)
HCT VFR BLD AUTO: 40.2 % (ref 35–47)
HDLC SERPL-MCNC: 108 MG/DL
HDLC SERPL: 1.9 (ref 0–5)
HGB BLD-MCNC: 13.1 G/DL (ref 11.5–16)
IMM GRANULOCYTES # BLD AUTO: 0 K/UL (ref 0–0.04)
IMM GRANULOCYTES NFR BLD AUTO: 0 % (ref 0–0.5)
LDLC SERPL CALC-MCNC: 88.6 MG/DL (ref 0–100)
LYMPHOCYTES # BLD: 1.5 K/UL (ref 0.8–3.5)
LYMPHOCYTES NFR BLD: 21 % (ref 12–49)
MCH RBC QN AUTO: 30.2 PG (ref 26–34)
MCHC RBC AUTO-ENTMCNC: 32.6 G/DL (ref 30–36.5)
MCV RBC AUTO: 92.6 FL (ref 80–99)
MONOCYTES # BLD: 0.5 K/UL (ref 0–1)
MONOCYTES NFR BLD: 7 % (ref 5–13)
NEUTS SEG # BLD: 4.9 K/UL (ref 1.8–8)
NEUTS SEG NFR BLD: 70 % (ref 32–75)
NRBC # BLD: 0 K/UL (ref 0–0.01)
NRBC BLD-RTO: 0 PER 100 WBC
PHENYTOIN SERPL-MCNC: 11.9 UG/ML (ref 10–20)
PLATELET # BLD AUTO: 199 K/UL (ref 150–400)
PMV BLD AUTO: 10 FL (ref 8.9–12.9)
POTASSIUM SERPL-SCNC: 4 MMOL/L (ref 3.5–5.1)
PROT SERPL-MCNC: 7.2 G/DL (ref 6.4–8.2)
RBC # BLD AUTO: 4.34 M/UL (ref 3.8–5.2)
SODIUM SERPL-SCNC: 143 MMOL/L (ref 136–145)
TRIGL SERPL-MCNC: 57 MG/DL
TSH SERPL DL<=0.05 MIU/L-ACNC: 2.08 UIU/ML (ref 0.36–3.74)
VLDLC SERPL CALC-MCNC: 11.4 MG/DL
WBC # BLD AUTO: 7 K/UL (ref 3.6–11)

## 2024-06-28 ENCOUNTER — HOSPITAL ENCOUNTER (OUTPATIENT)
Age: 79
End: 2024-06-28
Payer: MEDICARE

## 2024-06-28 VITALS — BODY MASS INDEX: 27.31 KG/M2 | WEIGHT: 160 LBS | HEIGHT: 64 IN

## 2024-06-28 DIAGNOSIS — Z12.31 ENCOUNTER FOR SCREENING MAMMOGRAM FOR MALIGNANT NEOPLASM OF BREAST: ICD-10-CM

## 2024-06-28 PROCEDURE — 77067 SCR MAMMO BI INCL CAD: CPT

## 2024-07-19 RX ORDER — PHENYTOIN SODIUM 100 MG/1
CAPSULE, EXTENDED RELEASE ORAL
Qty: 270 CAPSULE | Refills: 3 | Status: SHIPPED | OUTPATIENT
Start: 2024-07-19

## 2025-03-25 ENCOUNTER — OFFICE VISIT (OUTPATIENT)
Age: 80
End: 2025-03-25
Payer: MEDICARE

## 2025-03-25 VITALS
WEIGHT: 164 LBS | BODY MASS INDEX: 28 KG/M2 | OXYGEN SATURATION: 94 % | DIASTOLIC BLOOD PRESSURE: 70 MMHG | HEIGHT: 64 IN | SYSTOLIC BLOOD PRESSURE: 130 MMHG | HEART RATE: 90 BPM

## 2025-03-25 DIAGNOSIS — R00.2 PALPITATIONS: Primary | ICD-10-CM

## 2025-03-25 DIAGNOSIS — Z01.810 PREOP CARDIOVASCULAR EXAM: ICD-10-CM

## 2025-03-25 DIAGNOSIS — I49.3 VENTRICULAR PREMATURE DEPOLARIZATION: ICD-10-CM

## 2025-03-25 PROCEDURE — 1123F ACP DISCUSS/DSCN MKR DOCD: CPT | Performed by: SPECIALIST

## 2025-03-25 PROCEDURE — G8399 PT W/DXA RESULTS DOCUMENT: HCPCS | Performed by: SPECIALIST

## 2025-03-25 PROCEDURE — 99214 OFFICE O/P EST MOD 30 MIN: CPT | Performed by: SPECIALIST

## 2025-03-25 PROCEDURE — G8427 DOCREV CUR MEDS BY ELIG CLIN: HCPCS | Performed by: SPECIALIST

## 2025-03-25 PROCEDURE — 1160F RVW MEDS BY RX/DR IN RCRD: CPT | Performed by: SPECIALIST

## 2025-03-25 PROCEDURE — 1036F TOBACCO NON-USER: CPT | Performed by: SPECIALIST

## 2025-03-25 PROCEDURE — 1126F AMNT PAIN NOTED NONE PRSNT: CPT | Performed by: SPECIALIST

## 2025-03-25 PROCEDURE — 1090F PRES/ABSN URINE INCON ASSESS: CPT | Performed by: SPECIALIST

## 2025-03-25 PROCEDURE — G8419 CALC BMI OUT NRM PARAM NOF/U: HCPCS | Performed by: SPECIALIST

## 2025-03-25 PROCEDURE — 1159F MED LIST DOCD IN RCRD: CPT | Performed by: SPECIALIST

## 2025-03-25 ASSESSMENT — PATIENT HEALTH QUESTIONNAIRE - PHQ9
SUM OF ALL RESPONSES TO PHQ QUESTIONS 1-9: 0
SUM OF ALL RESPONSES TO PHQ QUESTIONS 1-9: 0
2. FEELING DOWN, DEPRESSED OR HOPELESS: NOT AT ALL
SUM OF ALL RESPONSES TO PHQ QUESTIONS 1-9: 0
SUM OF ALL RESPONSES TO PHQ QUESTIONS 1-9: 0
1. LITTLE INTEREST OR PLEASURE IN DOING THINGS: NOT AT ALL

## 2025-03-25 NOTE — PROGRESS NOTES
HISTORY OF PRESENT ILLNESS  Kellen Flores is a 79 y.o. female     SUMMARY:   Patient Active Problem List   Diagnosis    Cerebral meningioma (HCC)    GERD (gastroesophageal reflux disease)    Recurrent UTI    Seizure disorder (HCC)    Chest pain    Abnormal EKG    History of pulmonary embolus (PE)    Arrhythmia    Seizure (HCC)    Vulvar intraepithelial neoplasia (AIDE) grade 3    Pericarditis, acute    B12 deficiency    Meningioma (HCC)            CARDIOLOGY STUDIES TO DATE:  1/16 normal stress echo  5/20 holter, rare pacs, occasional pvcs  6/20 event monitor sinus rhythm to sinus bradycardia with occasional premature ventricular and atrial contractions  06/02/20  echo normal lvef, lae, trivial mvp with tr regurg    Chief Complaint   Patient presents with    Palpitations       HPI :  She is doing great with her only symptom being occasional self-limited palpitations which she has had off and on for years.  One of the reason she is here today is because she is scheduled for a knee replacement in the middle of April.  She is not doing a lot of walking because of her knee but she is still living independently and her blood pressure is fine on no medications.  She has gained a few pounds since we last met and she is still sorting through her 's things.  They did sell their second home up in [place].  Her son is in town so he does try to look after her as much as he can.  Her blood work from about a year ago all looked good.    CARDIAC ROS:   negative for chest pain, claudication, dyspnea, lower extremity edema, orthopnea, paroxysmal nocturnal dyspnea, and syncope    Family History   Problem Relation Age of Onset    Other Father         Gi bleed    Heart Disease Father         heart attack    Alcohol Abuse Father     Osteoarthritis Father         gout    Heart Attack Father     Coronary Art Dis Father     Gout Father     Heart Disease Mother         Pacemaker    Osteoarthritis Mother

## 2025-03-27 ENCOUNTER — OFFICE VISIT (OUTPATIENT)
Age: 80
End: 2025-03-27
Payer: MEDICARE

## 2025-03-27 VITALS
HEART RATE: 85 BPM | DIASTOLIC BLOOD PRESSURE: 80 MMHG | HEIGHT: 64 IN | OXYGEN SATURATION: 97 % | BODY MASS INDEX: 28.82 KG/M2 | WEIGHT: 168.8 LBS | TEMPERATURE: 97.8 F | RESPIRATION RATE: 16 BRPM | SYSTOLIC BLOOD PRESSURE: 148 MMHG

## 2025-03-27 DIAGNOSIS — Z01.818 PRE-OP EXAM: Primary | ICD-10-CM

## 2025-03-27 PROCEDURE — 1123F ACP DISCUSS/DSCN MKR DOCD: CPT | Performed by: NURSE PRACTITIONER

## 2025-03-27 PROCEDURE — 99214 OFFICE O/P EST MOD 30 MIN: CPT | Performed by: NURSE PRACTITIONER

## 2025-03-27 PROCEDURE — 1090F PRES/ABSN URINE INCON ASSESS: CPT | Performed by: NURSE PRACTITIONER

## 2025-03-27 PROCEDURE — 1159F MED LIST DOCD IN RCRD: CPT | Performed by: NURSE PRACTITIONER

## 2025-03-27 PROCEDURE — G8419 CALC BMI OUT NRM PARAM NOF/U: HCPCS | Performed by: NURSE PRACTITIONER

## 2025-03-27 PROCEDURE — 1125F AMNT PAIN NOTED PAIN PRSNT: CPT | Performed by: NURSE PRACTITIONER

## 2025-03-27 PROCEDURE — G8399 PT W/DXA RESULTS DOCUMENT: HCPCS | Performed by: NURSE PRACTITIONER

## 2025-03-27 PROCEDURE — 1036F TOBACCO NON-USER: CPT | Performed by: NURSE PRACTITIONER

## 2025-03-27 PROCEDURE — G8427 DOCREV CUR MEDS BY ELIG CLIN: HCPCS | Performed by: NURSE PRACTITIONER

## 2025-03-27 SDOH — ECONOMIC STABILITY: FOOD INSECURITY: WITHIN THE PAST 12 MONTHS, YOU WORRIED THAT YOUR FOOD WOULD RUN OUT BEFORE YOU GOT MONEY TO BUY MORE.: NEVER TRUE

## 2025-03-27 SDOH — ECONOMIC STABILITY: FOOD INSECURITY: WITHIN THE PAST 12 MONTHS, THE FOOD YOU BOUGHT JUST DIDN'T LAST AND YOU DIDN'T HAVE MONEY TO GET MORE.: NEVER TRUE

## 2025-03-27 ASSESSMENT — PATIENT HEALTH QUESTIONNAIRE - PHQ9
1. LITTLE INTEREST OR PLEASURE IN DOING THINGS: NOT AT ALL
SUM OF ALL RESPONSES TO PHQ QUESTIONS 1-9: 0
2. FEELING DOWN, DEPRESSED OR HOPELESS: NOT AT ALL

## 2025-03-27 NOTE — PROGRESS NOTES
Date of Exam: 3/27/2025    Kellen Flores is a 79 y.o. female (:1945) who presents for preoperative evaluation.   Procedure/Surgery:right knee replacement  Date of Procedure/Surgery: 4/15/25  Surgeon: [facility]urgical Facility: [facility]  Primary Physician: Aquilino Alex MD  Latex Allergy: No    Current Outpatient Medications   Medication Sig Dispense Refill    Omega-3 Fatty Acids (FISH OIL PO) Take 1,200 mg by mouth daily      DILANTIN 100 MG ER capsule TAKE 3 CAPSULES DAILY 270 capsule 3    Risedronate Sodium 150 MG TABS Take 150 mg by mouth every 30 days 3 tablet 3    acetaminophen (TYLENOL) 500 MG tablet Take by mouth every 6 hours as needed      Biotin 2.5 MG CAPS Take by mouth daily      vitamin D (CHOLECALCIFEROL) 25 MCG (1000 UT) TABS tablet Take by mouth daily      cyanocobalamin 500 MCG tablet Take 10 tablets by mouth daily      folic acid (FOLVITE) 800 MCG tablet Take 1 tablet by mouth daily      minoxidil (ROGAINE) 2 % external solution Apply topically 2 times daily       No current facility-administered medications for this visit.        Past Medical History:   Diagnosis Date    Allergic rhinitis     all my life    Anxiety     occasionally    Arrhythmia 's    PVC'S    Asthma     Bronchitis     Cancer (HCC)     Skin cancer SCC, BCC    Cerebral meningioma (HCC) resection     Chronic back pain 's    Chronic pain     BACK, KNEES, JOINTS    Cytomegalovirus (HCC)     Marko-Danlos syndrome     GERD (gastroesophageal reflux disease)     Menopause     Obesity     Osteoarthritis     Other ill-defined conditions(799.89)     brain tumor-benign    Pericarditis, acute 'S    Thromboembolus (HCC)     PE x2    Urinary incontinence 's        Past Surgical History:   Procedure Laterality Date    ADENOIDECTOMY      BRAIN SURGERY  1992    meningioma    CATARACT REMOVAL Left 2022    CHOLECYSTECTOMY      COLONOSCOPY FLX DX W/COLLJ SPEC WHEN PFRMD

## 2025-04-07 LAB
INR, EXTERNAL: 1
PT, EXTERNAL: 10.6

## 2025-05-12 RX ORDER — RISEDRONATE SODIUM 150 MG/1
TABLET, FILM COATED ORAL
Qty: 3 TABLET | Refills: 3 | Status: SHIPPED | OUTPATIENT
Start: 2025-05-12

## 2025-05-13 ENCOUNTER — TELEMEDICINE (OUTPATIENT)
Age: 80
End: 2025-05-13
Payer: MEDICARE

## 2025-05-13 DIAGNOSIS — R05.1 ACUTE COUGH: Primary | ICD-10-CM

## 2025-05-13 PROCEDURE — 1160F RVW MEDS BY RX/DR IN RCRD: CPT | Performed by: NURSE PRACTITIONER

## 2025-05-13 PROCEDURE — G8427 DOCREV CUR MEDS BY ELIG CLIN: HCPCS | Performed by: NURSE PRACTITIONER

## 2025-05-13 PROCEDURE — 1123F ACP DISCUSS/DSCN MKR DOCD: CPT | Performed by: NURSE PRACTITIONER

## 2025-05-13 PROCEDURE — G8399 PT W/DXA RESULTS DOCUMENT: HCPCS | Performed by: NURSE PRACTITIONER

## 2025-05-13 PROCEDURE — 1159F MED LIST DOCD IN RCRD: CPT | Performed by: NURSE PRACTITIONER

## 2025-05-13 PROCEDURE — 99213 OFFICE O/P EST LOW 20 MIN: CPT | Performed by: NURSE PRACTITIONER

## 2025-05-13 PROCEDURE — 1090F PRES/ABSN URINE INCON ASSESS: CPT | Performed by: NURSE PRACTITIONER

## 2025-05-13 RX ORDER — ALBUTEROL SULFATE 90 UG/1
2 INHALANT RESPIRATORY (INHALATION) 4 TIMES DAILY PRN
Qty: 18 G | Refills: 0 | Status: SHIPPED | OUTPATIENT
Start: 2025-05-13

## 2025-05-13 NOTE — PROGRESS NOTES
Kellen Flores is a 79 y.o. female who was seen by synchronous (real-time) audio-video technology on 5/13/2025.      Assessment & Plan:   Below is the assessment and plan developed based on review of pertinent history, physical exam (if applicable), labs, studies, and medications.    Encounter Diagnosis   Name Primary?    Acute cough Yes     Continue mucinex  Albuterol PRN  Add zyrtec  CXR if no improvement or at least in person evaluation      I spent at least 23 minutes on this visit with this established patient. ([id number])  Subjective:   Kellen Flores was seen for Cough    Patient reports runny nose since Friday. She felt like it was just allergies. She has not been taking zyrtec. She notes chest congestion started the next day. She has taken mucinex DM with some relief. She had a temp almost to 100 one night. She is wheezing. She notes clear runny nose. She has not covid tested.    Prior to Admission medications    Medication Sig Start Date End Date Taking? Authorizing Provider   albuterol sulfate HFA (VENTOLIN HFA) 108 (90 Base) MCG/ACT inhaler Inhale 2 puffs into the lungs 4 times daily as needed for Wheezing 5/13/25  Yes Mehul Ramirez, APRN - NP   Risedronate Sodium 150 MG TABS TAKE 1 TABLET EVERY 30 DAYS 5/12/25   Aquilino Alex MD   Omega-3 Fatty Acids (FISH OIL PO) Take 1,200 mg by mouth daily    Provider, MD Mary   DILANTIN 100 MG ER capsule TAKE 3 CAPSULES DAILY 7/19/24   Aquilino Alex MD   acetaminophen (TYLENOL) 500 MG tablet Take by mouth every 6 hours as needed    Automatic Reconciliation, Ar   Biotin 2.5 MG CAPS Take by mouth daily    Automatic Reconciliation, Ar   vitamin D (CHOLECALCIFEROL) 25 MCG (1000 UT) TABS tablet Take by mouth daily    Automatic Reconciliation, Ar   cyanocobalamin 500 MCG tablet Take 10 tablets by mouth daily    Automatic Reconciliation, Ar   folic acid (FOLVITE) 800 MCG tablet Take 1 tablet by mouth daily    Automatic Reconciliation, Ar   minoxidil

## 2025-05-16 ENCOUNTER — OFFICE VISIT (OUTPATIENT)
Age: 80
End: 2025-05-16
Payer: MEDICARE

## 2025-05-16 VITALS
SYSTOLIC BLOOD PRESSURE: 146 MMHG | HEIGHT: 64 IN | HEART RATE: 90 BPM | TEMPERATURE: 97.8 F | RESPIRATION RATE: 16 BRPM | DIASTOLIC BLOOD PRESSURE: 78 MMHG | OXYGEN SATURATION: 93 % | BODY MASS INDEX: 28.97 KG/M2

## 2025-05-16 DIAGNOSIS — J45.21 MILD INTERMITTENT ASTHMATIC BRONCHITIS WITH ACUTE EXACERBATION: Primary | ICD-10-CM

## 2025-05-16 PROCEDURE — 99213 OFFICE O/P EST LOW 20 MIN: CPT | Performed by: NURSE PRACTITIONER

## 2025-05-16 PROCEDURE — 1090F PRES/ABSN URINE INCON ASSESS: CPT | Performed by: NURSE PRACTITIONER

## 2025-05-16 PROCEDURE — 1123F ACP DISCUSS/DSCN MKR DOCD: CPT | Performed by: NURSE PRACTITIONER

## 2025-05-16 PROCEDURE — G8399 PT W/DXA RESULTS DOCUMENT: HCPCS | Performed by: NURSE PRACTITIONER

## 2025-05-16 PROCEDURE — 1036F TOBACCO NON-USER: CPT | Performed by: NURSE PRACTITIONER

## 2025-05-16 PROCEDURE — 1159F MED LIST DOCD IN RCRD: CPT | Performed by: NURSE PRACTITIONER

## 2025-05-16 PROCEDURE — G8419 CALC BMI OUT NRM PARAM NOF/U: HCPCS | Performed by: NURSE PRACTITIONER

## 2025-05-16 PROCEDURE — 1125F AMNT PAIN NOTED PAIN PRSNT: CPT | Performed by: NURSE PRACTITIONER

## 2025-05-16 PROCEDURE — G8427 DOCREV CUR MEDS BY ELIG CLIN: HCPCS | Performed by: NURSE PRACTITIONER

## 2025-05-16 RX ORDER — ASPIRIN 81 MG/1
81 TABLET ORAL
COMMUNITY
Start: 2025-04-15

## 2025-05-16 RX ORDER — OXYCODONE HYDROCHLORIDE 5 MG/1
5 TABLET ORAL EVERY 4 HOURS PRN
COMMUNITY
Start: 2025-05-06

## 2025-05-16 RX ORDER — AZITHROMYCIN 250 MG/1
TABLET, FILM COATED ORAL
Qty: 6 TABLET | Refills: 0 | Status: SHIPPED | OUTPATIENT
Start: 2025-05-16 | End: 2025-05-26

## 2025-05-16 RX ORDER — PSEUDOEPHEDRINE HCL 30 MG
100 TABLET ORAL
COMMUNITY
Start: 2025-04-14

## 2025-05-16 RX ORDER — METHYLPREDNISOLONE 4 MG/1
TABLET ORAL
Qty: 21 TABLET | Refills: 0 | Status: SHIPPED | OUTPATIENT
Start: 2025-05-16 | End: 2025-05-21

## 2025-05-16 ASSESSMENT — ENCOUNTER SYMPTOMS
COUGH: 1
WHEEZING: 1
SHORTNESS OF BREATH: 1

## 2025-05-16 NOTE — PROGRESS NOTES
Kellen Flores (:  1945) is a 79 y.o. female,here for evaluation of the following chief complaint(s):  Cough    BP (!) 146/78   Pulse 90   Temp 97.8 °F (36.6 °C) (Temporal)   Resp 16   Ht 1.626 m (5' 4\")   SpO2 93%   BMI 28.97 kg/m²       SUBJECTIVE/OBJECTIVE:    HPI:Patient reports ongoing cough and wheezing x 1 week. It seems to be getting worse. She is having coughing spells that make her vomit. No fever now. Cough produces phlegm but mostly just cloudy. She states she has history of asthma. She is using albuterol which does help.    Review of Systems   Respiratory:  Positive for cough, shortness of breath and wheezing.        Physical Exam  Constitutional:       Appearance: Normal appearance.   Cardiovascular:      Rate and Rhythm: Normal rate and regular rhythm.   Pulmonary:      Effort: Pulmonary effort is normal.      Breath sounds: Wheezing (and crackles scattered throughout) present.   Skin:     General: Skin is warm and dry.   Neurological:      General: No focal deficit present.      Mental Status: She is alert and oriented to person, place, and time.   Psychiatric:         Mood and Affect: Mood normal.         Behavior: Behavior normal.          ASSESSMENT/PLAN:  1. Mild intermittent asthmatic bronchitis with acute exacerbation  Antibiotic and steroid  Continue albuterol PRN  CXR if no improvement    --SONIA Dunaway - NP

## 2025-05-23 ENCOUNTER — HOSPITAL ENCOUNTER (OUTPATIENT)
Age: 80
Discharge: HOME | End: 2025-05-26
Payer: MEDICARE

## 2025-05-23 ENCOUNTER — PATIENT MESSAGE (OUTPATIENT)
Age: 80
End: 2025-05-23

## 2025-05-23 DIAGNOSIS — R05.1 ACUTE COUGH: ICD-10-CM

## 2025-05-23 DIAGNOSIS — R05.1 ACUTE COUGH: Primary | ICD-10-CM

## 2025-05-23 DIAGNOSIS — J18.9 PNEUMONIA OF RIGHT LOWER LOBE DUE TO INFECTIOUS ORGANISM: Primary | ICD-10-CM

## 2025-05-23 PROCEDURE — 71046 X-RAY EXAM CHEST 2 VIEWS: CPT

## 2025-05-27 RX ORDER — METHYLPREDNISOLONE 4 MG/1
TABLET ORAL
Qty: 1 KIT | Refills: 0 | Status: SHIPPED | OUTPATIENT
Start: 2025-05-27 | End: 2025-06-02

## 2025-05-28 RX ORDER — DOXYCYCLINE HYCLATE 100 MG
100 TABLET ORAL 2 TIMES DAILY
Qty: 20 TABLET | Refills: 0 | Status: SHIPPED | OUTPATIENT
Start: 2025-05-28 | End: 2025-06-07

## 2025-07-14 RX ORDER — PHENYTOIN SODIUM 100 MG/1
CAPSULE, EXTENDED RELEASE ORAL
Qty: 270 CAPSULE | Refills: 3 | Status: SHIPPED | OUTPATIENT
Start: 2025-07-14

## (undated) DEVICE — GARMENT,MEDLINE,DVT,INT,CALF,MED, GEN2: Brand: MEDLINE

## (undated) DEVICE — COVER MPLR TIP CRV SCIS ACC DA VINCI

## (undated) DEVICE — ARM DRAPE

## (undated) DEVICE — PAD,SANITARY,11 IN,MAXI,N-STRL,IND WRAP: Brand: MEDLINE

## (undated) DEVICE — SUTURE VCRL SZ 2-0 L27IN ABSRB UD L26MM SH 1/2 CIR J417H

## (undated) DEVICE — SUTURE DEV SZ 3-0 V-LOC 90 L12IN TO L18IN CV-23 VLT VLOCM0844

## (undated) DEVICE — ELECTRODE NDL 2.8IN COAT VALLEYLAB

## (undated) DEVICE — SEAL UNIV 5-8MM DISP BX/10 -- DA VINCI XI - SNGL USE

## (undated) DEVICE — YANKAUER,TAPERED BULBOUS TIP,W/O VENT: Brand: MEDLINE

## (undated) DEVICE — SUTURE VCRL SZ 3-0 L27IN ABSRB UD L26MM SH 1/2 CIR J416H

## (undated) DEVICE — GLOVE SURG SZ 75 L12IN FNGR THK79MIL GRN LTX FREE

## (undated) DEVICE — Device

## (undated) DEVICE — SUT ETHBND 2-0 36IN SH DA GRN --

## (undated) DEVICE — TUBING, SUCTION, 1/4" X 12', STRAIGHT: Brand: MEDLINE

## (undated) DEVICE — SOLUTION IRRIG 1000ML 0.9% SOD CHL USP POUR PLAS BTL

## (undated) DEVICE — GENERAL LAPAROSCOPY - SMH: Brand: MEDLINE INDUSTRIES, INC.

## (undated) DEVICE — DEVICE TRNSF SPIK STL 2008S] MICROTEK MEDICAL INC]

## (undated) DEVICE — TRAY PREP DRY W/ PREM GLV 2 APPL 6 SPNG 2 UNDPD 1 OVERWRAP

## (undated) DEVICE — LEGGINGS, PAIR, 31X48, STERILE: Brand: MEDLINE

## (undated) DEVICE — SHEET,DRAPE,UNDERBUTTOCK,GRAD POUCH,PORT: Brand: MEDLINE

## (undated) DEVICE — BASIN SET MIN W/O CAUT PNCL --

## (undated) DEVICE — SUTURE MCRYL SZ 4-0 L27IN ABSRB UD L19MM PS-2 1/2 CIR PRIM Y426H

## (undated) DEVICE — BLADELESS OBTURATOR: Brand: WECK VISTA

## (undated) DEVICE — SWAB MEDICATED 8 IN PROCTO

## (undated) DEVICE — DRAPE,LITHOTOMY,STERILE: Brand: MEDLINE

## (undated) DEVICE — GOWN,SIRUS,NONRNF,SETINSLV,XL,20/CS: Brand: MEDLINE

## (undated) DEVICE — SYR 10ML LUER LOK 1/5ML GRAD --

## (undated) DEVICE — HYPODERMIC SAFETY NEEDLE: Brand: MAGELLAN

## (undated) DEVICE — REM POLYHESIVE ADULT PATIENT RETURN ELECTRODE: Brand: VALLEYLAB

## (undated) DEVICE — NEEDLE HYPO 22GA L1.5IN BLK S STL HUB POLYPR SHLD REG BVL

## (undated) DEVICE — GLOVE ORANGE PI 7   MSG9070

## (undated) DEVICE — DERMABOND SKIN ADH 0.7ML -- DERMABOND ADVANCED 12/BX

## (undated) DEVICE — VISUALIZATION SYSTEM: Brand: CLEARIFY

## (undated) DEVICE — HANDLE LT SNAP ON ULT DURABLE LENS FOR TRUMPF ALC DISPOSABLE

## (undated) DEVICE — SYRINGE IRRIG 60ML SFT PLIABLE BLB EZ TO GRP 1 HND USE W/

## (undated) DEVICE — PAD PT POS 36 IN SURGYPAD DISP